# Patient Record
Sex: FEMALE | Race: BLACK OR AFRICAN AMERICAN | NOT HISPANIC OR LATINO | Employment: OTHER | URBAN - METROPOLITAN AREA
[De-identification: names, ages, dates, MRNs, and addresses within clinical notes are randomized per-mention and may not be internally consistent; named-entity substitution may affect disease eponyms.]

---

## 2018-05-22 ENCOUNTER — APPOINTMENT (OUTPATIENT)
Dept: MRI IMAGING | Facility: HOSPITAL | Age: 73
End: 2018-05-22

## 2018-05-22 ENCOUNTER — HOSPITAL ENCOUNTER (INPATIENT)
Facility: HOSPITAL | Age: 73
LOS: 7 days | Discharge: HOME HEALTH | End: 2018-05-29
Attending: INTERNAL MEDICINE | Admitting: RADIOLOGY

## 2018-05-22 DIAGNOSIS — I65.09 VERTEBROBASILAR ARTERY STENOSIS: ICD-10-CM

## 2018-05-22 DIAGNOSIS — Z78.9 IMPAIRED MOBILITY AND ADLS: Primary | ICD-10-CM

## 2018-05-22 DIAGNOSIS — R13.10 DYSPHAGIA, UNSPECIFIED TYPE: ICD-10-CM

## 2018-05-22 DIAGNOSIS — Z74.09 IMPAIRED MOBILITY AND ADLS: Primary | ICD-10-CM

## 2018-05-22 DIAGNOSIS — R47.1 DYSARTHRIA: ICD-10-CM

## 2018-05-22 DIAGNOSIS — I65.1 VERTEBROBASILAR ARTERY STENOSIS: ICD-10-CM

## 2018-05-22 DIAGNOSIS — Z74.09 IMPAIRED FUNCTIONAL MOBILITY, BALANCE, GAIT, AND ENDURANCE: ICD-10-CM

## 2018-05-22 PROBLEM — N18.9 CHRONIC KIDNEY DISEASE: Status: ACTIVE | Noted: 2018-05-22

## 2018-05-22 PROBLEM — I67.2 ATHEROSCLEROTIC CEREBROVASCULAR DISEASE: Status: ACTIVE | Noted: 2018-05-22

## 2018-05-22 PROBLEM — I10 HYPERTENSION: Status: ACTIVE | Noted: 2018-05-22

## 2018-05-22 PROBLEM — I63.9 STROKE (HCC): Status: ACTIVE | Noted: 2018-05-22

## 2018-05-22 PROBLEM — E78.5 HYPERLIPIDEMIA: Status: ACTIVE | Noted: 2018-05-22

## 2018-05-22 PROBLEM — I63.9 CVA (CEREBRAL VASCULAR ACCIDENT) (HCC): Status: ACTIVE | Noted: 2018-05-22

## 2018-05-22 LAB — GLUCOSE BLDC GLUCOMTR-MCNC: 90 MG/DL (ref 70–130)

## 2018-05-22 PROCEDURE — 70544 MR ANGIOGRAPHY HEAD W/O DYE: CPT

## 2018-05-22 PROCEDURE — C1769 GUIDE WIRE: HCPCS | Performed by: RADIOLOGY

## 2018-05-22 PROCEDURE — C1894 INTRO/SHEATH, NON-LASER: HCPCS | Performed by: RADIOLOGY

## 2018-05-22 PROCEDURE — C1887 CATHETER, GUIDING: HCPCS | Performed by: RADIOLOGY

## 2018-05-22 PROCEDURE — 25010000002 HEPARIN (PORCINE) PER 1000 UNITS: Performed by: RADIOLOGY

## 2018-05-22 PROCEDURE — C1894 INTRO/SHEATH, NON-LASER: HCPCS

## 2018-05-22 PROCEDURE — 61645 PERQ ART M-THROMBECT &/NFS: CPT | Performed by: RADIOLOGY

## 2018-05-22 PROCEDURE — 0 IODIXANOL PER 1 ML: Performed by: RADIOLOGY

## 2018-05-22 PROCEDURE — 03CG3ZZ EXTIRPATION OF MATTER FROM INTRACRANIAL ARTERY, PERCUTANEOUS APPROACH: ICD-10-PCS | Performed by: RADIOLOGY

## 2018-05-22 PROCEDURE — 99221 1ST HOSP IP/OBS SF/LOW 40: CPT | Performed by: NURSE PRACTITIONER

## 2018-05-22 PROCEDURE — 02HV33Z INSERTION OF INFUSION DEVICE INTO SUPERIOR VENA CAVA, PERCUTANEOUS APPROACH: ICD-10-PCS | Performed by: INTERNAL MEDICINE

## 2018-05-22 PROCEDURE — 25010000002 HEPARIN (PORCINE) PER 1000 UNITS: Performed by: INTERNAL MEDICINE

## 2018-05-22 PROCEDURE — C1760 CLOSURE DEV, VASC: HCPCS | Performed by: RADIOLOGY

## 2018-05-22 PROCEDURE — 70551 MRI BRAIN STEM W/O DYE: CPT

## 2018-05-22 PROCEDURE — C1725 CATH, TRANSLUMIN NON-LASER: HCPCS | Performed by: RADIOLOGY

## 2018-05-22 PROCEDURE — 82962 GLUCOSE BLOOD TEST: CPT

## 2018-05-22 PROCEDURE — 99291 CRITICAL CARE FIRST HOUR: CPT | Performed by: INTERNAL MEDICINE

## 2018-05-22 PROCEDURE — C1751 CATH, INF, PER/CENT/MIDLINE: HCPCS

## 2018-05-22 PROCEDURE — B31RYZZ FLUOROSCOPY OF INTRACRANIAL ARTERIES USING OTHER CONTRAST: ICD-10-PCS | Performed by: RADIOLOGY

## 2018-05-22 PROCEDURE — 3E06317 INTRODUCTION OF OTHER THROMBOLYTIC INTO CENTRAL ARTERY, PERCUTANEOUS APPROACH: ICD-10-PCS | Performed by: RADIOLOGY

## 2018-05-22 PROCEDURE — 25010000002 ALTEPLASE 2 MG RECONSTITUTED SOLUTION: Performed by: RADIOLOGY

## 2018-05-22 RX ORDER — SODIUM CHLORIDE 0.9 % (FLUSH) 0.9 %
1-10 SYRINGE (ML) INJECTION AS NEEDED
Status: DISCONTINUED | OUTPATIENT
Start: 2018-05-22 | End: 2018-05-29 | Stop reason: HOSPADM

## 2018-05-22 RX ORDER — CLOPIDOGREL BISULFATE 75 MG/1
75 TABLET ORAL DAILY
Status: DISCONTINUED | OUTPATIENT
Start: 2018-05-22 | End: 2018-05-23

## 2018-05-22 RX ORDER — PHENYLEPHRINE HCL IN 0.9% NACL 0.5 MG/5ML
.5-3 SYRINGE (ML) INTRAVENOUS
Status: DISCONTINUED | OUTPATIENT
Start: 2018-05-22 | End: 2018-05-22 | Stop reason: SDUPTHER

## 2018-05-22 RX ORDER — HEPARIN SODIUM 1000 [USP'U]/ML
INJECTION, SOLUTION INTRAVENOUS; SUBCUTANEOUS AS NEEDED
Status: DISCONTINUED | OUTPATIENT
Start: 2018-05-22 | End: 2018-05-22 | Stop reason: HOSPADM

## 2018-05-22 RX ORDER — SODIUM CHLORIDE 0.9 % (FLUSH) 0.9 %
10 SYRINGE (ML) INJECTION AS NEEDED
Status: DISCONTINUED | OUTPATIENT
Start: 2018-05-22 | End: 2018-05-23

## 2018-05-22 RX ORDER — ATORVASTATIN CALCIUM 40 MG/1
80 TABLET, FILM COATED ORAL NIGHTLY
Status: DISCONTINUED | OUTPATIENT
Start: 2018-05-22 | End: 2018-05-22 | Stop reason: SDUPTHER

## 2018-05-22 RX ORDER — SODIUM CHLORIDE 9 MG/ML
50 INJECTION, SOLUTION INTRAVENOUS CONTINUOUS
Status: DISCONTINUED | OUTPATIENT
Start: 2018-05-22 | End: 2018-05-26

## 2018-05-22 RX ORDER — ATORVASTATIN CALCIUM 40 MG/1
80 TABLET, FILM COATED ORAL NIGHTLY
Status: DISCONTINUED | OUTPATIENT
Start: 2018-05-22 | End: 2018-05-29 | Stop reason: HOSPADM

## 2018-05-22 RX ORDER — LIDOCAINE HYDROCHLORIDE 10 MG/ML
INJECTION, SOLUTION EPIDURAL; INFILTRATION; INTRACAUDAL; PERINEURAL AS NEEDED
Status: DISCONTINUED | OUTPATIENT
Start: 2018-05-22 | End: 2018-05-22 | Stop reason: HOSPADM

## 2018-05-22 RX ORDER — ONDANSETRON 2 MG/ML
4 INJECTION INTRAMUSCULAR; INTRAVENOUS EVERY 6 HOURS PRN
Status: DISCONTINUED | OUTPATIENT
Start: 2018-05-22 | End: 2018-05-29 | Stop reason: HOSPADM

## 2018-05-22 RX ORDER — SODIUM CHLORIDE 0.9 % (FLUSH) 0.9 %
1-10 SYRINGE (ML) INJECTION AS NEEDED
Status: DISCONTINUED | OUTPATIENT
Start: 2018-05-22 | End: 2018-05-23

## 2018-05-22 RX ORDER — CLOPIDOGREL BISULFATE 75 MG/1
TABLET ORAL AS NEEDED
Status: DISCONTINUED | OUTPATIENT
Start: 2018-05-22 | End: 2018-05-22 | Stop reason: HOSPADM

## 2018-05-22 RX ORDER — ASPIRIN 300 MG/1
300 SUPPOSITORY RECTAL DAILY
Status: DISCONTINUED | OUTPATIENT
Start: 2018-05-23 | End: 2018-05-23

## 2018-05-22 RX ORDER — IODIXANOL 320 MG/ML
INJECTION, SOLUTION INTRAVASCULAR AS NEEDED
Status: DISCONTINUED | OUTPATIENT
Start: 2018-05-22 | End: 2018-05-22 | Stop reason: HOSPADM

## 2018-05-22 RX ORDER — ASPIRIN 300 MG/1
300 SUPPOSITORY RECTAL DAILY
Status: DISCONTINUED | OUTPATIENT
Start: 2018-05-22 | End: 2018-05-23

## 2018-05-22 RX ORDER — ASPIRIN 325 MG
325 TABLET ORAL DAILY
Status: DISCONTINUED | OUTPATIENT
Start: 2018-05-23 | End: 2018-05-23

## 2018-05-22 RX ORDER — ASPIRIN 325 MG
325 TABLET ORAL DAILY
Status: DISCONTINUED | OUTPATIENT
Start: 2018-05-22 | End: 2018-05-23

## 2018-05-22 RX ADMIN — ASPIRIN 300 MG: 300 SUPPOSITORY RECTAL at 12:45

## 2018-05-22 RX ADMIN — Medication 0.5 MCG/KG/MIN: at 12:46

## 2018-05-22 RX ADMIN — SODIUM CHLORIDE 50 ML/HR: 9 INJECTION, SOLUTION INTRAVENOUS at 16:50

## 2018-05-22 RX ADMIN — NICARDIPINE HYDROCHLORIDE 5 MG/HR: 0.1 INJECTION, SOLUTION INTRAVENOUS at 22:06

## 2018-05-22 RX ADMIN — HEPARIN SODIUM 12 UNITS/KG/HR: 10000 INJECTION, SOLUTION INTRAVENOUS at 17:16

## 2018-05-22 RX ADMIN — NICARDIPINE HYDROCHLORIDE 5 MG/HR: 0.1 INJECTION, SOLUTION INTRAVENOUS at 17:39

## 2018-05-23 ENCOUNTER — APPOINTMENT (OUTPATIENT)
Dept: CT IMAGING | Facility: HOSPITAL | Age: 73
End: 2018-05-23

## 2018-05-23 ENCOUNTER — ANCILLARY PROCEDURE (OUTPATIENT)
Dept: SPEECH THERAPY | Facility: HOSPITAL | Age: 73
End: 2018-05-23
Attending: INTERNAL MEDICINE

## 2018-05-23 LAB
ALBUMIN SERPL-MCNC: 3.7 G/DL (ref 3.2–4.8)
ALBUMIN/GLOB SERPL: 1.3 G/DL (ref 1.5–2.5)
ALP SERPL-CCNC: 88 U/L (ref 25–100)
ALT SERPL W P-5'-P-CCNC: 24 U/L (ref 7–40)
ANION GAP SERPL CALCULATED.3IONS-SCNC: 9 MMOL/L (ref 3–11)
ARTICHOKE IGE QN: 141 MG/DL (ref 0–130)
AST SERPL-CCNC: 29 U/L (ref 0–33)
BASOPHILS # BLD AUTO: 0.07 10*3/MM3 (ref 0–0.2)
BASOPHILS NFR BLD AUTO: 0.9 % (ref 0–1)
BILIRUB SERPL-MCNC: 1 MG/DL (ref 0.3–1.2)
BUN BLD-MCNC: 17 MG/DL (ref 9–23)
BUN/CREAT SERPL: 18.9 (ref 7–25)
CALCIUM SPEC-SCNC: 8.4 MG/DL (ref 8.7–10.4)
CHLORIDE SERPL-SCNC: 104 MMOL/L (ref 99–109)
CHOLEST SERPL-MCNC: 201 MG/DL (ref 0–200)
CO2 SERPL-SCNC: 26 MMOL/L (ref 20–31)
CREAT BLD-MCNC: 0.9 MG/DL (ref 0.6–1.3)
DEPRECATED RDW RBC AUTO: 43.5 FL (ref 37–54)
EOSINOPHIL # BLD AUTO: 0.26 10*3/MM3 (ref 0–0.3)
EOSINOPHIL NFR BLD AUTO: 3.3 % (ref 0–3)
ERYTHROCYTE [DISTWIDTH] IN BLOOD BY AUTOMATED COUNT: 13.9 % (ref 11.3–14.5)
GFR SERPL CREATININE-BSD FRML MDRD: 74 ML/MIN/1.73
GLOBULIN UR ELPH-MCNC: 2.9 GM/DL
GLUCOSE BLD-MCNC: 86 MG/DL (ref 70–100)
GLUCOSE BLDC GLUCOMTR-MCNC: 79 MG/DL (ref 70–130)
HBA1C MFR BLD: 6.2 % (ref 4.8–5.6)
HCT VFR BLD AUTO: 50.7 % (ref 34.5–44)
HDLC SERPL-MCNC: 55 MG/DL (ref 40–60)
HGB BLD-MCNC: 16.2 G/DL (ref 11.5–15.5)
IMM GRANULOCYTES # BLD: 0.01 10*3/MM3 (ref 0–0.03)
IMM GRANULOCYTES NFR BLD: 0.1 % (ref 0–0.6)
LYMPHOCYTES # BLD AUTO: 2.04 10*3/MM3 (ref 0.6–4.8)
LYMPHOCYTES NFR BLD AUTO: 25.8 % (ref 24–44)
MAGNESIUM SERPL-MCNC: 1.9 MG/DL (ref 1.3–2.7)
MCH RBC QN AUTO: 27.5 PG (ref 27–31)
MCHC RBC AUTO-ENTMCNC: 32 G/DL (ref 32–36)
MCV RBC AUTO: 86.1 FL (ref 80–99)
MONOCYTES # BLD AUTO: 1.07 10*3/MM3 (ref 0–1)
MONOCYTES NFR BLD AUTO: 13.5 % (ref 0–12)
NEUTROPHILS # BLD AUTO: 4.48 10*3/MM3 (ref 1.5–8.3)
NEUTROPHILS NFR BLD AUTO: 56.5 % (ref 41–71)
PHOSPHATE SERPL-MCNC: 3.6 MG/DL (ref 2.4–5.1)
PLATELET # BLD AUTO: 246 10*3/MM3 (ref 150–450)
PMV BLD AUTO: 10 FL (ref 6–12)
POTASSIUM BLD-SCNC: 3.3 MMOL/L (ref 3.5–5.5)
POTASSIUM BLD-SCNC: 4.5 MMOL/L (ref 3.5–5.5)
PROT SERPL-MCNC: 6.6 G/DL (ref 5.7–8.2)
RBC # BLD AUTO: 5.89 10*6/MM3 (ref 3.89–5.14)
SODIUM BLD-SCNC: 139 MMOL/L (ref 132–146)
TRIGL SERPL-MCNC: 81 MG/DL (ref 0–150)
WBC NRBC COR # BLD: 7.92 10*3/MM3 (ref 3.5–10.8)

## 2018-05-23 PROCEDURE — 97165 OT EVAL LOW COMPLEX 30 MIN: CPT

## 2018-05-23 PROCEDURE — 83735 ASSAY OF MAGNESIUM: CPT | Performed by: INTERNAL MEDICINE

## 2018-05-23 PROCEDURE — 99233 SBSQ HOSP IP/OBS HIGH 50: CPT | Performed by: INTERNAL MEDICINE

## 2018-05-23 PROCEDURE — 84100 ASSAY OF PHOSPHORUS: CPT | Performed by: INTERNAL MEDICINE

## 2018-05-23 PROCEDURE — 70450 CT HEAD/BRAIN W/O DYE: CPT

## 2018-05-23 PROCEDURE — 84132 ASSAY OF SERUM POTASSIUM: CPT | Performed by: INTERNAL MEDICINE

## 2018-05-23 PROCEDURE — 80061 LIPID PANEL: CPT | Performed by: NURSE PRACTITIONER

## 2018-05-23 PROCEDURE — 85025 COMPLETE CBC W/AUTO DIFF WBC: CPT | Performed by: INTERNAL MEDICINE

## 2018-05-23 PROCEDURE — 82962 GLUCOSE BLOOD TEST: CPT

## 2018-05-23 PROCEDURE — 80053 COMPREHEN METABOLIC PANEL: CPT | Performed by: NURSE PRACTITIONER

## 2018-05-23 PROCEDURE — 97116 GAIT TRAINING THERAPY: CPT

## 2018-05-23 PROCEDURE — 94799 UNLISTED PULMONARY SVC/PX: CPT

## 2018-05-23 PROCEDURE — 92612 ENDOSCOPY SWALLOW (FEES) VID: CPT

## 2018-05-23 PROCEDURE — 97162 PT EVAL MOD COMPLEX 30 MIN: CPT

## 2018-05-23 PROCEDURE — 99232 SBSQ HOSP IP/OBS MODERATE 35: CPT | Performed by: RADIOLOGY

## 2018-05-23 PROCEDURE — 83036 HEMOGLOBIN GLYCOSYLATED A1C: CPT | Performed by: NURSE PRACTITIONER

## 2018-05-23 RX ORDER — ASPIRIN 81 MG/1
81 TABLET ORAL DAILY
COMMUNITY
End: 2018-06-20 | Stop reason: HOSPADM

## 2018-05-23 RX ORDER — MAGNESIUM SULFATE HEPTAHYDRATE 40 MG/ML
2 INJECTION, SOLUTION INTRAVENOUS AS NEEDED
Status: DISCONTINUED | OUTPATIENT
Start: 2018-05-23 | End: 2018-05-29 | Stop reason: HOSPADM

## 2018-05-23 RX ORDER — POTASSIUM CHLORIDE 1.5 G/1.77G
40 POWDER, FOR SOLUTION ORAL AS NEEDED
Status: DISCONTINUED | OUTPATIENT
Start: 2018-05-23 | End: 2018-05-29 | Stop reason: HOSPADM

## 2018-05-23 RX ORDER — ASPIRIN 81 MG/1
81 TABLET, CHEWABLE ORAL DAILY
Status: DISCONTINUED | OUTPATIENT
Start: 2018-05-23 | End: 2018-05-29 | Stop reason: HOSPADM

## 2018-05-23 RX ORDER — POTASSIUM CHLORIDE 750 MG/1
40 CAPSULE, EXTENDED RELEASE ORAL AS NEEDED
Status: DISCONTINUED | OUTPATIENT
Start: 2018-05-23 | End: 2018-05-29 | Stop reason: HOSPADM

## 2018-05-23 RX ORDER — MAGNESIUM SULFATE HEPTAHYDRATE 40 MG/ML
4 INJECTION, SOLUTION INTRAVENOUS AS NEEDED
Status: DISCONTINUED | OUTPATIENT
Start: 2018-05-23 | End: 2018-05-29 | Stop reason: HOSPADM

## 2018-05-23 RX ORDER — ATORVASTATIN CALCIUM 40 MG/1
40 TABLET, FILM COATED ORAL DAILY
COMMUNITY
End: 2018-05-29 | Stop reason: HOSPADM

## 2018-05-23 RX ORDER — CLOPIDOGREL BISULFATE 75 MG/1
75 TABLET ORAL DAILY
Status: DISCONTINUED | OUTPATIENT
Start: 2018-05-23 | End: 2018-05-29 | Stop reason: HOSPADM

## 2018-05-23 RX ADMIN — POTASSIUM CHLORIDE 40 MEQ: 750 CAPSULE, EXTENDED RELEASE ORAL at 14:30

## 2018-05-23 RX ADMIN — MAGNESIUM SULFATE HEPTAHYDRATE 4 G: 40 INJECTION, SOLUTION INTRAVENOUS at 06:12

## 2018-05-23 RX ADMIN — POTASSIUM CHLORIDE 40 MEQ: 750 CAPSULE, EXTENDED RELEASE ORAL at 09:36

## 2018-05-23 RX ADMIN — ATORVASTATIN CALCIUM 80 MG: 40 TABLET, FILM COATED ORAL at 20:00

## 2018-05-23 RX ADMIN — CLOPIDOGREL BISULFATE 75 MG: 75 TABLET ORAL at 09:36

## 2018-05-23 RX ADMIN — ASPIRIN 81 MG CHEWABLE TABLET 81 MG: 81 TABLET CHEWABLE at 09:36

## 2018-05-23 RX ADMIN — SODIUM CHLORIDE 50 ML/HR: 9 INJECTION, SOLUTION INTRAVENOUS at 20:02

## 2018-05-24 ENCOUNTER — APPOINTMENT (OUTPATIENT)
Dept: MRI IMAGING | Facility: HOSPITAL | Age: 73
End: 2018-05-24

## 2018-05-24 LAB
ANION GAP SERPL CALCULATED.3IONS-SCNC: 6 MMOL/L (ref 3–11)
BUN BLD-MCNC: 19 MG/DL (ref 9–23)
BUN/CREAT SERPL: 23.8 (ref 7–25)
CALCIUM SPEC-SCNC: 8.7 MG/DL (ref 8.7–10.4)
CHLORIDE SERPL-SCNC: 106 MMOL/L (ref 99–109)
CO2 SERPL-SCNC: 23 MMOL/L (ref 20–31)
CREAT BLD-MCNC: 0.8 MG/DL (ref 0.6–1.3)
GFR SERPL CREATININE-BSD FRML MDRD: 85 ML/MIN/1.73
GLUCOSE BLD-MCNC: 100 MG/DL (ref 70–100)
MAGNESIUM SERPL-MCNC: 2.3 MG/DL (ref 1.3–2.7)
POTASSIUM BLD-SCNC: 4.2 MMOL/L (ref 3.5–5.5)
SODIUM BLD-SCNC: 135 MMOL/L (ref 132–146)

## 2018-05-24 PROCEDURE — 92526 ORAL FUNCTION THERAPY: CPT

## 2018-05-24 PROCEDURE — 25010000002 HYDRALAZINE PER 20 MG: Performed by: NURSE PRACTITIONER

## 2018-05-24 PROCEDURE — 99231 SBSQ HOSP IP/OBS SF/LOW 25: CPT | Performed by: NURSE PRACTITIONER

## 2018-05-24 PROCEDURE — 70544 MR ANGIOGRAPHY HEAD W/O DYE: CPT

## 2018-05-24 PROCEDURE — 97110 THERAPEUTIC EXERCISES: CPT

## 2018-05-24 PROCEDURE — 97530 THERAPEUTIC ACTIVITIES: CPT

## 2018-05-24 PROCEDURE — 80048 BASIC METABOLIC PNL TOTAL CA: CPT | Performed by: INTERNAL MEDICINE

## 2018-05-24 PROCEDURE — 92523 SPEECH SOUND LANG COMPREHEN: CPT

## 2018-05-24 PROCEDURE — 99233 SBSQ HOSP IP/OBS HIGH 50: CPT | Performed by: INTERNAL MEDICINE

## 2018-05-24 PROCEDURE — 83735 ASSAY OF MAGNESIUM: CPT | Performed by: INTERNAL MEDICINE

## 2018-05-24 RX ORDER — HYDRALAZINE HYDROCHLORIDE 20 MG/ML
10 INJECTION INTRAMUSCULAR; INTRAVENOUS EVERY 4 HOURS PRN
Status: DISCONTINUED | OUTPATIENT
Start: 2018-05-24 | End: 2018-05-29 | Stop reason: HOSPADM

## 2018-05-24 RX ORDER — AMLODIPINE BESYLATE 5 MG/1
5 TABLET ORAL
Status: DISCONTINUED | OUTPATIENT
Start: 2018-05-24 | End: 2018-05-28

## 2018-05-24 RX ADMIN — ASPIRIN 81 MG CHEWABLE TABLET 81 MG: 81 TABLET CHEWABLE at 08:22

## 2018-05-24 RX ADMIN — Medication 10 MG: at 16:35

## 2018-05-24 RX ADMIN — Medication 10 MG: at 20:09

## 2018-05-24 RX ADMIN — AMLODIPINE BESYLATE 5 MG: 5 TABLET ORAL at 16:18

## 2018-05-24 RX ADMIN — ATORVASTATIN CALCIUM 80 MG: 40 TABLET, FILM COATED ORAL at 20:06

## 2018-05-24 RX ADMIN — CLOPIDOGREL BISULFATE 75 MG: 75 TABLET ORAL at 08:22

## 2018-05-25 PROCEDURE — 92507 TX SP LANG VOICE COMM INDIV: CPT

## 2018-05-25 PROCEDURE — 25010000002 HYDRALAZINE PER 20 MG: Performed by: NURSE PRACTITIONER

## 2018-05-25 PROCEDURE — 99233 SBSQ HOSP IP/OBS HIGH 50: CPT | Performed by: INTERNAL MEDICINE

## 2018-05-25 PROCEDURE — 99231 SBSQ HOSP IP/OBS SF/LOW 25: CPT | Performed by: RADIOLOGY

## 2018-05-25 PROCEDURE — 97116 GAIT TRAINING THERAPY: CPT | Performed by: PHYSICAL THERAPIST

## 2018-05-25 PROCEDURE — 97110 THERAPEUTIC EXERCISES: CPT | Performed by: PHYSICAL THERAPIST

## 2018-05-25 RX ADMIN — AMLODIPINE BESYLATE 5 MG: 5 TABLET ORAL at 08:27

## 2018-05-25 RX ADMIN — Medication 10 MG: at 11:39

## 2018-05-25 RX ADMIN — CLOPIDOGREL BISULFATE 75 MG: 75 TABLET ORAL at 08:27

## 2018-05-25 RX ADMIN — ATORVASTATIN CALCIUM 80 MG: 40 TABLET, FILM COATED ORAL at 21:07

## 2018-05-25 RX ADMIN — ASPIRIN 81 MG CHEWABLE TABLET 81 MG: 81 TABLET CHEWABLE at 08:27

## 2018-05-26 PROCEDURE — 97530 THERAPEUTIC ACTIVITIES: CPT | Performed by: OCCUPATIONAL THERAPIST

## 2018-05-26 PROCEDURE — 99232 SBSQ HOSP IP/OBS MODERATE 35: CPT | Performed by: FAMILY MEDICINE

## 2018-05-26 RX ADMIN — AMLODIPINE BESYLATE 5 MG: 5 TABLET ORAL at 09:35

## 2018-05-26 RX ADMIN — CLOPIDOGREL BISULFATE 75 MG: 75 TABLET ORAL at 09:35

## 2018-05-26 RX ADMIN — ASPIRIN 81 MG CHEWABLE TABLET 81 MG: 81 TABLET CHEWABLE at 09:35

## 2018-05-26 RX ADMIN — ATORVASTATIN CALCIUM 80 MG: 40 TABLET, FILM COATED ORAL at 20:12

## 2018-05-27 PROCEDURE — 99232 SBSQ HOSP IP/OBS MODERATE 35: CPT | Performed by: INTERNAL MEDICINE

## 2018-05-27 PROCEDURE — 97110 THERAPEUTIC EXERCISES: CPT

## 2018-05-27 PROCEDURE — 97116 GAIT TRAINING THERAPY: CPT

## 2018-05-27 RX ADMIN — ASPIRIN 81 MG CHEWABLE TABLET 81 MG: 81 TABLET CHEWABLE at 08:28

## 2018-05-27 RX ADMIN — ATORVASTATIN CALCIUM 80 MG: 40 TABLET, FILM COATED ORAL at 20:26

## 2018-05-27 RX ADMIN — CLOPIDOGREL BISULFATE 75 MG: 75 TABLET ORAL at 08:28

## 2018-05-27 RX ADMIN — AMLODIPINE BESYLATE 5 MG: 5 TABLET ORAL at 08:28

## 2018-05-28 PROCEDURE — 99232 SBSQ HOSP IP/OBS MODERATE 35: CPT | Performed by: INTERNAL MEDICINE

## 2018-05-28 PROCEDURE — 97110 THERAPEUTIC EXERCISES: CPT

## 2018-05-28 PROCEDURE — 97530 THERAPEUTIC ACTIVITIES: CPT

## 2018-05-28 PROCEDURE — 97116 GAIT TRAINING THERAPY: CPT

## 2018-05-28 RX ORDER — AMLODIPINE BESYLATE 5 MG/1
5 TABLET ORAL
Status: DISCONTINUED | OUTPATIENT
Start: 2018-05-29 | End: 2018-05-29

## 2018-05-28 RX ORDER — AMLODIPINE BESYLATE 10 MG/1
10 TABLET ORAL
Status: DISCONTINUED | OUTPATIENT
Start: 2018-05-29 | End: 2018-05-28

## 2018-05-28 RX ORDER — AMLODIPINE BESYLATE 5 MG/1
5 TABLET ORAL ONCE
Status: DISCONTINUED | OUTPATIENT
Start: 2018-05-28 | End: 2018-05-28

## 2018-05-28 RX ADMIN — ATORVASTATIN CALCIUM 80 MG: 40 TABLET, FILM COATED ORAL at 21:34

## 2018-05-28 RX ADMIN — ASPIRIN 81 MG CHEWABLE TABLET 81 MG: 81 TABLET CHEWABLE at 10:00

## 2018-05-28 RX ADMIN — Medication 10 ML: at 08:07

## 2018-05-28 RX ADMIN — Medication 10 ML: at 08:08

## 2018-05-28 RX ADMIN — CLOPIDOGREL BISULFATE 75 MG: 75 TABLET ORAL at 10:00

## 2018-05-28 RX ADMIN — AMLODIPINE BESYLATE 5 MG: 5 TABLET ORAL at 08:00

## 2018-05-29 VITALS
WEIGHT: 175.93 LBS | SYSTOLIC BLOOD PRESSURE: 172 MMHG | BODY MASS INDEX: 28.27 KG/M2 | HEIGHT: 66 IN | OXYGEN SATURATION: 99 % | RESPIRATION RATE: 18 BRPM | TEMPERATURE: 97.9 F | DIASTOLIC BLOOD PRESSURE: 106 MMHG | HEART RATE: 77 BPM

## 2018-05-29 PROCEDURE — 99239 HOSP IP/OBS DSCHRG MGMT >30: CPT | Performed by: INTERNAL MEDICINE

## 2018-05-29 PROCEDURE — 99232 SBSQ HOSP IP/OBS MODERATE 35: CPT | Performed by: NURSE PRACTITIONER

## 2018-05-29 RX ORDER — AMLODIPINE BESYLATE 2.5 MG/1
7.5 TABLET ORAL
Qty: 90 TABLET | Refills: 2 | Status: ON HOLD | OUTPATIENT
Start: 2018-05-30 | End: 2018-06-14

## 2018-05-29 RX ORDER — AMLODIPINE BESYLATE 2.5 MG/1
2.5 TABLET ORAL ONCE
Status: COMPLETED | OUTPATIENT
Start: 2018-05-29 | End: 2018-05-29

## 2018-05-29 RX ORDER — CLOPIDOGREL BISULFATE 75 MG/1
75 TABLET ORAL DAILY
Qty: 30 TABLET | Refills: 2 | Status: SHIPPED | OUTPATIENT
Start: 2018-05-30 | End: 2018-06-20 | Stop reason: HOSPADM

## 2018-05-29 RX ORDER — ATORVASTATIN CALCIUM 80 MG/1
80 TABLET, FILM COATED ORAL NIGHTLY
Qty: 30 TABLET | Refills: 2 | Status: SHIPPED | OUTPATIENT
Start: 2018-05-29 | End: 2018-06-20 | Stop reason: HOSPADM

## 2018-05-29 RX ADMIN — AMLODIPINE BESYLATE 5 MG: 5 TABLET ORAL at 09:30

## 2018-05-29 RX ADMIN — AMLODIPINE BESYLATE 2.5 MG: 2.5 TABLET ORAL at 13:35

## 2018-05-29 RX ADMIN — Medication 10 ML: at 08:00

## 2018-05-29 RX ADMIN — Medication 10 ML: at 08:01

## 2018-05-29 RX ADMIN — CLOPIDOGREL BISULFATE 75 MG: 75 TABLET ORAL at 09:30

## 2018-05-29 RX ADMIN — ASPIRIN 81 MG CHEWABLE TABLET 81 MG: 81 TABLET CHEWABLE at 09:30

## 2018-06-13 ENCOUNTER — APPOINTMENT (OUTPATIENT)
Dept: GENERAL RADIOLOGY | Facility: HOSPITAL | Age: 73
End: 2018-06-13

## 2018-06-13 ENCOUNTER — APPOINTMENT (OUTPATIENT)
Dept: CT IMAGING | Facility: HOSPITAL | Age: 73
End: 2018-06-13

## 2018-06-13 ENCOUNTER — HOSPITAL ENCOUNTER (INPATIENT)
Facility: HOSPITAL | Age: 73
LOS: 7 days | Discharge: NURSING, REHAB OR LONG-TERM CARE | End: 2018-06-20
Attending: EMERGENCY MEDICINE | Admitting: INTERNAL MEDICINE

## 2018-06-13 ENCOUNTER — APPOINTMENT (OUTPATIENT)
Dept: MRI IMAGING | Facility: HOSPITAL | Age: 73
End: 2018-06-13

## 2018-06-13 ENCOUNTER — ANESTHESIA (OUTPATIENT)
Dept: CARDIOLOGY | Facility: HOSPITAL | Age: 73
End: 2018-06-13

## 2018-06-13 ENCOUNTER — ANESTHESIA EVENT (OUTPATIENT)
Dept: CARDIOLOGY | Facility: HOSPITAL | Age: 73
End: 2018-06-13

## 2018-06-13 DIAGNOSIS — I65.1 VERTEBROBASILAR ARTERY STENOSIS: ICD-10-CM

## 2018-06-13 DIAGNOSIS — R13.12 OROPHARYNGEAL DYSPHAGIA: ICD-10-CM

## 2018-06-13 DIAGNOSIS — Z74.09 IMPAIRED MOBILITY AND ADLS: ICD-10-CM

## 2018-06-13 DIAGNOSIS — Z74.09 IMPAIRED FUNCTIONAL MOBILITY, BALANCE, GAIT, AND ENDURANCE: ICD-10-CM

## 2018-06-13 DIAGNOSIS — I65.09 VERTEBROBASILAR ARTERY STENOSIS: ICD-10-CM

## 2018-06-13 DIAGNOSIS — I63.9 CEREBROVASCULAR ACCIDENT (CVA), UNSPECIFIED MECHANISM (HCC): Primary | ICD-10-CM

## 2018-06-13 DIAGNOSIS — R47.1 DYSARTHRIA: ICD-10-CM

## 2018-06-13 DIAGNOSIS — Z78.9 IMPAIRED MOBILITY AND ADLS: ICD-10-CM

## 2018-06-13 DIAGNOSIS — R29.898 WEAKNESS OF LEFT LOWER EXTREMITY: ICD-10-CM

## 2018-06-13 LAB
BASOPHILS # BLD AUTO: 0.06 10*3/MM3 (ref 0–0.2)
BASOPHILS NFR BLD AUTO: 0.8 % (ref 0–1)
BUN BLDA-MCNC: 17 MG/DL (ref 8–26)
CA-I BLDA-SCNC: 1.27 MMOL/L (ref 1.2–1.32)
CHLORIDE BLDA-SCNC: 100 MMOL/L (ref 98–109)
CO2 BLDA-SCNC: 28 MMOL/L (ref 24–29)
CREAT BLDA-MCNC: 0.9 MG/DL (ref 0.6–1.3)
DEPRECATED RDW RBC AUTO: 41.4 FL (ref 37–54)
EOSINOPHIL # BLD AUTO: 0.13 10*3/MM3 (ref 0–0.3)
EOSINOPHIL NFR BLD AUTO: 1.7 % (ref 0–3)
ERYTHROCYTE [DISTWIDTH] IN BLOOD BY AUTOMATED COUNT: 13.2 % (ref 11.3–14.5)
GLUCOSE BLDC GLUCOMTR-MCNC: 106 MG/DL (ref 70–130)
GLUCOSE BLDC GLUCOMTR-MCNC: 121 MG/DL (ref 70–130)
HCT VFR BLD AUTO: 50.8 % (ref 34.5–44)
HCT VFR BLDA CALC: 54 % (ref 38–51)
HGB BLD-MCNC: 16.2 G/DL (ref 11.5–15.5)
HGB BLDA-MCNC: 18.4 G/DL (ref 12–17)
HOLD SPECIMEN: NORMAL
HOLD SPECIMEN: NORMAL
IMM GRANULOCYTES # BLD: 0.01 10*3/MM3 (ref 0–0.03)
IMM GRANULOCYTES NFR BLD: 0.1 % (ref 0–0.6)
INR PPP: 1 (ref 0.8–1.2)
LYMPHOCYTES # BLD AUTO: 2.64 10*3/MM3 (ref 0.6–4.8)
LYMPHOCYTES NFR BLD AUTO: 35.3 % (ref 24–44)
MCH RBC QN AUTO: 27.6 PG (ref 27–31)
MCHC RBC AUTO-ENTMCNC: 31.9 G/DL (ref 32–36)
MCV RBC AUTO: 86.5 FL (ref 80–99)
MONOCYTES # BLD AUTO: 0.47 10*3/MM3 (ref 0–1)
MONOCYTES NFR BLD AUTO: 6.3 % (ref 0–12)
NEUTROPHILS # BLD AUTO: 4.18 10*3/MM3 (ref 1.5–8.3)
NEUTROPHILS NFR BLD AUTO: 55.9 % (ref 41–71)
PA ADP PRP-ACNC: 103 PRU
PLATELET # BLD AUTO: 268 10*3/MM3 (ref 150–450)
PMV BLD AUTO: 9.6 FL (ref 6–12)
POTASSIUM BLDA-SCNC: 3.5 MMOL/L (ref 3.5–4.9)
PROTHROMBIN TIME: 11.8 SECONDS (ref 12.8–15.2)
RBC # BLD AUTO: 5.87 10*6/MM3 (ref 3.89–5.14)
SODIUM BLDA-SCNC: 143 MMOL/L (ref 138–146)
WBC NRBC COR # BLD: 7.48 10*3/MM3 (ref 3.5–10.8)
WHOLE BLOOD HOLD SPECIMEN: NORMAL
WHOLE BLOOD HOLD SPECIMEN: NORMAL

## 2018-06-13 PROCEDURE — 25010000002 HEPARIN (PORCINE) PER 1000 UNITS: Performed by: NURSE ANESTHETIST, CERTIFIED REGISTERED

## 2018-06-13 PROCEDURE — 25010000002 HEPARIN (PORCINE) PER 1000 UNITS

## 2018-06-13 PROCEDURE — 76377 3D RENDER W/INTRP POSTPROCES: CPT | Performed by: RADIOLOGY

## 2018-06-13 PROCEDURE — C1874 STENT, COATED/COV W/DEL SYS: HCPCS | Performed by: RADIOLOGY

## 2018-06-13 PROCEDURE — 03CG3ZZ EXTIRPATION OF MATTER FROM INTRACRANIAL ARTERY, PERCUTANEOUS APPROACH: ICD-10-PCS | Performed by: RADIOLOGY

## 2018-06-13 PROCEDURE — C1887 CATHETER, GUIDING: HCPCS | Performed by: RADIOLOGY

## 2018-06-13 PROCEDURE — 80047 BASIC METABLC PNL IONIZED CA: CPT

## 2018-06-13 PROCEDURE — 70450 CT HEAD/BRAIN W/O DYE: CPT

## 2018-06-13 PROCEDURE — A9577 INJ MULTIHANCE: HCPCS | Performed by: EMERGENCY MEDICINE

## 2018-06-13 PROCEDURE — 61645 PERQ ART M-THROMBECT &/NFS: CPT | Performed by: RADIOLOGY

## 2018-06-13 PROCEDURE — 25010000002 ALTEPLASE 2 MG RECONSTITUTED SOLUTION: Performed by: RADIOLOGY

## 2018-06-13 PROCEDURE — C1769 GUIDE WIRE: HCPCS | Performed by: RADIOLOGY

## 2018-06-13 PROCEDURE — 99285 EMERGENCY DEPT VISIT HI MDM: CPT

## 2018-06-13 PROCEDURE — 85610 PROTHROMBIN TIME: CPT

## 2018-06-13 PROCEDURE — 0 IODIXANOL PER 1 ML: Performed by: RADIOLOGY

## 2018-06-13 PROCEDURE — 85014 HEMATOCRIT: CPT

## 2018-06-13 PROCEDURE — 99291 CRITICAL CARE FIRST HOUR: CPT | Performed by: INTERNAL MEDICINE

## 2018-06-13 PROCEDURE — 93005 ELECTROCARDIOGRAM TRACING: CPT | Performed by: EMERGENCY MEDICINE

## 2018-06-13 PROCEDURE — 25010000002 FENTANYL CITRATE (PF) 100 MCG/2ML SOLUTION: Performed by: NURSE ANESTHETIST, CERTIFIED REGISTERED

## 2018-06-13 PROCEDURE — 3E06317 INTRODUCTION OF OTHER THROMBOLYTIC INTO CENTRAL ARTERY, PERCUTANEOUS APPROACH: ICD-10-PCS | Performed by: RADIOLOGY

## 2018-06-13 PROCEDURE — 25010000002 DEXAMETHASONE PER 1 MG: Performed by: NURSE ANESTHETIST, CERTIFIED REGISTERED

## 2018-06-13 PROCEDURE — 25010000002 NEOSTIGMINE 10 MG/10ML SOLUTION: Performed by: NURSE ANESTHETIST, CERTIFIED REGISTERED

## 2018-06-13 PROCEDURE — 71045 X-RAY EXAM CHEST 1 VIEW: CPT

## 2018-06-13 PROCEDURE — 25010000002 PROPOFOL 10 MG/ML EMULSION: Performed by: NURSE ANESTHETIST, CERTIFIED REGISTERED

## 2018-06-13 PROCEDURE — 99223 1ST HOSP IP/OBS HIGH 75: CPT | Performed by: NURSE PRACTITIONER

## 2018-06-13 PROCEDURE — 85025 COMPLETE CBC W/AUTO DIFF WBC: CPT | Performed by: EMERGENCY MEDICINE

## 2018-06-13 PROCEDURE — C1894 INTRO/SHEATH, NON-LASER: HCPCS | Performed by: RADIOLOGY

## 2018-06-13 PROCEDURE — 037G34Z DILATION OF INTRACRANIAL ARTERY WITH DRUG-ELUTING INTRALUMINAL DEVICE, PERCUTANEOUS APPROACH: ICD-10-PCS | Performed by: RADIOLOGY

## 2018-06-13 PROCEDURE — 25010000002 ONDANSETRON PER 1 MG: Performed by: NURSE ANESTHETIST, CERTIFIED REGISTERED

## 2018-06-13 PROCEDURE — 0 GADOBENATE DIMEGLUMINE 529 MG/ML SOLUTION: Performed by: EMERGENCY MEDICINE

## 2018-06-13 PROCEDURE — 25010000002 PHENYLEPHRINE PER 1 ML: Performed by: NURSE ANESTHETIST, CERTIFIED REGISTERED

## 2018-06-13 PROCEDURE — 85576 BLOOD PLATELET AGGREGATION: CPT | Performed by: NURSE PRACTITIONER

## 2018-06-13 PROCEDURE — 70546 MR ANGIOGRAPH HEAD W/O&W/DYE: CPT

## 2018-06-13 PROCEDURE — 82962 GLUCOSE BLOOD TEST: CPT

## 2018-06-13 DEVICE — XIENCE SIERRA™ EVEROLIMUS ELUTING CORONARY STENT SYSTEM 2.25 MM X 08 MM / RAPID-EXCHANGE
Type: IMPLANTABLE DEVICE | Status: FUNCTIONAL
Brand: XIENCE SIERRA™

## 2018-06-13 RX ORDER — FENTANYL CITRATE 50 UG/ML
INJECTION, SOLUTION INTRAMUSCULAR; INTRAVENOUS AS NEEDED
Status: DISCONTINUED | OUTPATIENT
Start: 2018-06-13 | End: 2018-06-13 | Stop reason: SURG

## 2018-06-13 RX ORDER — ONDANSETRON 2 MG/ML
4 INJECTION INTRAMUSCULAR; INTRAVENOUS ONCE AS NEEDED
Status: DISCONTINUED | OUTPATIENT
Start: 2018-06-13 | End: 2018-06-13 | Stop reason: HOSPADM

## 2018-06-13 RX ORDER — PROMETHAZINE HYDROCHLORIDE 25 MG/ML
6.25 INJECTION, SOLUTION INTRAMUSCULAR; INTRAVENOUS ONCE AS NEEDED
Status: DISCONTINUED | OUTPATIENT
Start: 2018-06-13 | End: 2018-06-13 | Stop reason: HOSPADM

## 2018-06-13 RX ORDER — SODIUM CHLORIDE 9 MG/ML
75 INJECTION, SOLUTION INTRAVENOUS CONTINUOUS
Status: DISCONTINUED | OUTPATIENT
Start: 2018-06-13 | End: 2018-06-15

## 2018-06-13 RX ORDER — GLYCOPYRROLATE 0.2 MG/ML
INJECTION INTRAMUSCULAR; INTRAVENOUS AS NEEDED
Status: DISCONTINUED | OUTPATIENT
Start: 2018-06-13 | End: 2018-06-13 | Stop reason: SURG

## 2018-06-13 RX ORDER — ONDANSETRON 2 MG/ML
INJECTION INTRAMUSCULAR; INTRAVENOUS AS NEEDED
Status: DISCONTINUED | OUTPATIENT
Start: 2018-06-13 | End: 2018-06-13 | Stop reason: SURG

## 2018-06-13 RX ORDER — OXYCODONE HYDROCHLORIDE AND ACETAMINOPHEN 5; 325 MG/1; MG/1
1 TABLET ORAL ONCE AS NEEDED
Status: DISCONTINUED | OUTPATIENT
Start: 2018-06-13 | End: 2018-06-13 | Stop reason: HOSPADM

## 2018-06-13 RX ORDER — ASPIRIN 81 MG/1
81 TABLET, CHEWABLE ORAL DAILY
Status: DISCONTINUED | OUTPATIENT
Start: 2018-06-14 | End: 2018-06-20 | Stop reason: HOSPADM

## 2018-06-13 RX ORDER — SODIUM CHLORIDE 9 MG/ML
100 INJECTION, SOLUTION INTRAVENOUS CONTINUOUS
Status: DISCONTINUED | OUTPATIENT
Start: 2018-06-13 | End: 2018-06-13

## 2018-06-13 RX ORDER — IODIXANOL 320 MG/ML
INJECTION, SOLUTION INTRAVASCULAR AS NEEDED
Status: DISCONTINUED | OUTPATIENT
Start: 2018-06-13 | End: 2018-06-13 | Stop reason: HOSPADM

## 2018-06-13 RX ORDER — ACETAMINOPHEN 325 MG/1
650 TABLET ORAL ONCE AS NEEDED
Status: DISCONTINUED | OUTPATIENT
Start: 2018-06-13 | End: 2018-06-13 | Stop reason: HOSPADM

## 2018-06-13 RX ORDER — CLOPIDOGREL BISULFATE 75 MG/1
75 TABLET ORAL DAILY
Status: DISCONTINUED | OUTPATIENT
Start: 2018-06-14 | End: 2018-06-20 | Stop reason: HOSPADM

## 2018-06-13 RX ORDER — SODIUM CHLORIDE 0.9 % (FLUSH) 0.9 %
10 SYRINGE (ML) INJECTION AS NEEDED
Status: DISCONTINUED | OUTPATIENT
Start: 2018-06-13 | End: 2018-06-13

## 2018-06-13 RX ORDER — SODIUM CHLORIDE, SODIUM LACTATE, POTASSIUM CHLORIDE, CALCIUM CHLORIDE 600; 310; 30; 20 MG/100ML; MG/100ML; MG/100ML; MG/100ML
INJECTION, SOLUTION INTRAVENOUS CONTINUOUS PRN
Status: DISCONTINUED | OUTPATIENT
Start: 2018-06-13 | End: 2018-06-13 | Stop reason: SURG

## 2018-06-13 RX ORDER — PROMETHAZINE HYDROCHLORIDE 25 MG/1
25 SUPPOSITORY RECTAL ONCE AS NEEDED
Status: DISCONTINUED | OUTPATIENT
Start: 2018-06-13 | End: 2018-06-13 | Stop reason: HOSPADM

## 2018-06-13 RX ORDER — ASPIRIN 81 MG/1
81 TABLET, CHEWABLE ORAL DAILY
Status: DISCONTINUED | OUTPATIENT
Start: 2018-06-13 | End: 2018-06-13

## 2018-06-13 RX ORDER — ROCURONIUM BROMIDE 10 MG/ML
INJECTION, SOLUTION INTRAVENOUS AS NEEDED
Status: DISCONTINUED | OUTPATIENT
Start: 2018-06-13 | End: 2018-06-13 | Stop reason: SURG

## 2018-06-13 RX ORDER — HEPARIN SODIUM 1000 [USP'U]/ML
INJECTION, SOLUTION INTRAVENOUS; SUBCUTANEOUS AS NEEDED
Status: DISCONTINUED | OUTPATIENT
Start: 2018-06-13 | End: 2018-06-13 | Stop reason: SURG

## 2018-06-13 RX ORDER — ONDANSETRON 2 MG/ML
4 INJECTION INTRAMUSCULAR; INTRAVENOUS EVERY 6 HOURS PRN
Status: DISCONTINUED | OUTPATIENT
Start: 2018-06-13 | End: 2018-06-20 | Stop reason: HOSPADM

## 2018-06-13 RX ORDER — SODIUM CHLORIDE 0.9 % (FLUSH) 0.9 %
1-10 SYRINGE (ML) INJECTION AS NEEDED
Status: DISCONTINUED | OUTPATIENT
Start: 2018-06-13 | End: 2018-06-20 | Stop reason: HOSPADM

## 2018-06-13 RX ORDER — CLOPIDOGREL BISULFATE 75 MG/1
75 TABLET ORAL DAILY
Status: DISCONTINUED | OUTPATIENT
Start: 2018-06-13 | End: 2018-06-13

## 2018-06-13 RX ORDER — IPRATROPIUM BROMIDE AND ALBUTEROL SULFATE 2.5; .5 MG/3ML; MG/3ML
3 SOLUTION RESPIRATORY (INHALATION) ONCE AS NEEDED
Status: DISCONTINUED | OUTPATIENT
Start: 2018-06-13 | End: 2018-06-13 | Stop reason: HOSPADM

## 2018-06-13 RX ORDER — ATORVASTATIN CALCIUM 40 MG/1
80 TABLET, FILM COATED ORAL NIGHTLY
Status: DISCONTINUED | OUTPATIENT
Start: 2018-06-13 | End: 2018-06-20 | Stop reason: HOSPADM

## 2018-06-13 RX ORDER — PROMETHAZINE HYDROCHLORIDE 25 MG/1
25 TABLET ORAL ONCE AS NEEDED
Status: DISCONTINUED | OUTPATIENT
Start: 2018-06-13 | End: 2018-06-13 | Stop reason: HOSPADM

## 2018-06-13 RX ORDER — ASPIRIN 81 MG/1
81 TABLET ORAL DAILY
Status: DISCONTINUED | OUTPATIENT
Start: 2018-06-13 | End: 2018-06-13 | Stop reason: CLARIF

## 2018-06-13 RX ORDER — NEOSTIGMINE METHYLSULFATE 1 MG/ML
INJECTION, SOLUTION INTRAVENOUS AS NEEDED
Status: DISCONTINUED | OUTPATIENT
Start: 2018-06-13 | End: 2018-06-13 | Stop reason: SURG

## 2018-06-13 RX ORDER — HYDROCODONE BITARTRATE AND ACETAMINOPHEN 5; 325 MG/1; MG/1
1 TABLET ORAL EVERY 4 HOURS PRN
Status: DISCONTINUED | OUTPATIENT
Start: 2018-06-13 | End: 2018-06-20 | Stop reason: HOSPADM

## 2018-06-13 RX ORDER — DEXAMETHASONE SODIUM PHOSPHATE 4 MG/ML
INJECTION, SOLUTION INTRA-ARTICULAR; INTRALESIONAL; INTRAMUSCULAR; INTRAVENOUS; SOFT TISSUE AS NEEDED
Status: DISCONTINUED | OUTPATIENT
Start: 2018-06-13 | End: 2018-06-13 | Stop reason: SURG

## 2018-06-13 RX ORDER — LIDOCAINE HYDROCHLORIDE 10 MG/ML
INJECTION, SOLUTION EPIDURAL; INFILTRATION; INTRACAUDAL; PERINEURAL AS NEEDED
Status: DISCONTINUED | OUTPATIENT
Start: 2018-06-13 | End: 2018-06-13 | Stop reason: SURG

## 2018-06-13 RX ORDER — LIDOCAINE HYDROCHLORIDE 10 MG/ML
INJECTION, SOLUTION EPIDURAL; INFILTRATION; INTRACAUDAL; PERINEURAL AS NEEDED
Status: DISCONTINUED | OUTPATIENT
Start: 2018-06-13 | End: 2018-06-13 | Stop reason: HOSPADM

## 2018-06-13 RX ORDER — VECURONIUM BROMIDE 1 MG/ML
INJECTION, POWDER, LYOPHILIZED, FOR SOLUTION INTRAVENOUS AS NEEDED
Status: DISCONTINUED | OUTPATIENT
Start: 2018-06-13 | End: 2018-06-13 | Stop reason: SURG

## 2018-06-13 RX ORDER — ACETAMINOPHEN 650 MG/1
650 SUPPOSITORY RECTAL ONCE AS NEEDED
Status: DISCONTINUED | OUTPATIENT
Start: 2018-06-13 | End: 2018-06-13 | Stop reason: HOSPADM

## 2018-06-13 RX ORDER — FENTANYL CITRATE 50 UG/ML
50 INJECTION, SOLUTION INTRAMUSCULAR; INTRAVENOUS
Status: DISCONTINUED | OUTPATIENT
Start: 2018-06-13 | End: 2018-06-13 | Stop reason: HOSPADM

## 2018-06-13 RX ORDER — PROPOFOL 10 MG/ML
VIAL (ML) INTRAVENOUS AS NEEDED
Status: DISCONTINUED | OUTPATIENT
Start: 2018-06-13 | End: 2018-06-13 | Stop reason: SURG

## 2018-06-13 RX ADMIN — HEPARIN SODIUM 12 UNITS/KG/HR: 10000 INJECTION, SOLUTION INTRAVENOUS at 11:32

## 2018-06-13 RX ADMIN — GLYCOPYRROLATE 0.2 MG: 0.2 INJECTION INTRAMUSCULAR; INTRAVENOUS at 13:31

## 2018-06-13 RX ADMIN — SODIUM CHLORIDE 5 MG/HR: 0.9 INJECTION, SOLUTION INTRAVENOUS at 14:15

## 2018-06-13 RX ADMIN — GADOBENATE DIMEGLUMINE 14 ML: 529 INJECTION, SOLUTION INTRAVENOUS at 10:45

## 2018-06-13 RX ADMIN — SODIUM CHLORIDE 100 ML/HR: 9 INJECTION, SOLUTION INTRAVENOUS at 11:32

## 2018-06-13 RX ADMIN — LIDOCAINE HYDROCHLORIDE 50 MG: 10 INJECTION, SOLUTION EPIDURAL; INFILTRATION; INTRACAUDAL; PERINEURAL at 12:12

## 2018-06-13 RX ADMIN — NEOSTIGMINE METHYLSULFATE 3 MG: 1 INJECTION, SOLUTION INTRAVENOUS at 14:05

## 2018-06-13 RX ADMIN — FENTANYL CITRATE 50 MCG: 50 INJECTION, SOLUTION INTRAMUSCULAR; INTRAVENOUS at 14:20

## 2018-06-13 RX ADMIN — VECURONIUM BROMIDE 1 MG: 1 INJECTION, POWDER, LYOPHILIZED, FOR SOLUTION INTRAVENOUS at 13:33

## 2018-06-13 RX ADMIN — VECURONIUM BROMIDE 2 MG: 1 INJECTION, POWDER, LYOPHILIZED, FOR SOLUTION INTRAVENOUS at 12:40

## 2018-06-13 RX ADMIN — SODIUM CHLORIDE, POTASSIUM CHLORIDE, SODIUM LACTATE AND CALCIUM CHLORIDE: 600; 310; 30; 20 INJECTION, SOLUTION INTRAVENOUS at 12:05

## 2018-06-13 RX ADMIN — SODIUM CHLORIDE 100 ML/HR: 9 INJECTION, SOLUTION INTRAVENOUS at 16:16

## 2018-06-13 RX ADMIN — ROCURONIUM BROMIDE 50 MG: 10 SOLUTION INTRAVENOUS at 12:12

## 2018-06-13 RX ADMIN — NICARDIPINE HYDROCHLORIDE 5 MG/HR: 0.1 INJECTION, SOLUTION INTRAVENOUS at 20:19

## 2018-06-13 RX ADMIN — PROPOFOL 200 MG: 10 INJECTION, EMULSION INTRAVENOUS at 12:12

## 2018-06-13 RX ADMIN — FENTANYL CITRATE 50 MCG: 50 INJECTION, SOLUTION INTRAMUSCULAR; INTRAVENOUS at 12:12

## 2018-06-13 RX ADMIN — SODIUM CHLORIDE, POTASSIUM CHLORIDE, SODIUM LACTATE AND CALCIUM CHLORIDE: 600; 310; 30; 20 INJECTION, SOLUTION INTRAVENOUS at 14:15

## 2018-06-13 RX ADMIN — PHENYLEPHRINE HYDROCHLORIDE 1 MCG/KG/MIN: 10 INJECTION INTRAVENOUS at 12:27

## 2018-06-13 RX ADMIN — ONDANSETRON 4 MG: 2 INJECTION INTRAMUSCULAR; INTRAVENOUS at 14:05

## 2018-06-13 RX ADMIN — DEXAMETHASONE SODIUM PHOSPHATE 8 MG: 4 INJECTION, SOLUTION INTRAMUSCULAR; INTRAVENOUS at 13:45

## 2018-06-13 RX ADMIN — HEPARIN SODIUM 5000 UNITS: 1000 INJECTION, SOLUTION INTRAVENOUS; SUBCUTANEOUS at 12:46

## 2018-06-13 RX ADMIN — GLYCOPYRROLATE 0.4 MG: 0.2 INJECTION INTRAMUSCULAR; INTRAVENOUS at 14:05

## 2018-06-13 RX ADMIN — NICARDIPINE HYDROCHLORIDE 5 MG/HR: 0.1 INJECTION, SOLUTION INTRAVENOUS at 16:22

## 2018-06-13 NOTE — ANESTHESIA PREPROCEDURE EVALUATION
Anesthesia Evaluation     Patient summary reviewed and Nursing notes reviewed   no history of anesthetic complications:  NPO Solid Status: Waived due to emergency  NPO Liquid Status: Waived due to emergency           Airway   Mallampati: II  TM distance: >3 FB  Neck ROM: full  No difficulty expected  Dental - normal exam     Pulmonary - negative pulmonary ROS and normal exam    breath sounds clear to auscultation  Cardiovascular - normal exam    ECG reviewed  PT is on anticoagulation therapy  Rhythm: regular  Rate: normal    (+) hypertension, CABG, PVD,       Neuro/Psych  (+) CVA,       ROS Comment: Vertebral artery vasospasm - slurred speech, ataxia  GI/Hepatic/Renal/Endo - negative ROS     Musculoskeletal (-) negative ROS    Abdominal    Substance History      OB/GYN          Other - negative ROS                       Anesthesia Plan    ASA 4 - emergent     general   (A-line)  intravenous induction   Anesthetic plan and risks discussed with patient.    Plan discussed with CRNA.

## 2018-06-13 NOTE — PLAN OF CARE
Neurointerventional Metrics    Last Known Well: 2 days prior (6/11/18)    [facility] Arrival:  0858    Initial NIHSS: 6    Baseline MRS:  1    IV tPA:  No, >4.5 hrs since LKW    CT Head: 0901    CT Perfusion: n/a    Arrival to Cath Lab:  1204    Groin Access: 1232    Guide Catheter Placement:  n/a    Microcatheter Placement:  n/a    Microcatheter Injection:  n/a    Stent Retriever Deployment:  1249 - 3 mg IA tPA and microcatheter clot disruption     Stent Retriever Removal: n/a    Reperfusion:  1302    Final Angiogram:   1358    End of Procedure:  1402    Initial TICI flow:  0    Final TICI flow:  3

## 2018-06-13 NOTE — H&P
Intensive Care Admission Note     Cerebrovascular accident (CVA)    History of Present Illness     This very nice 72-year-old woman with a history of previous strokes including vertebral artery occlusion who was recently here in May for angioplasty who had been feeling somewhat poorly this past week including generalized weakness as well as intermittent slurred speech. 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 sears. She successfully underwent intra-arterial tissue plasminogen activator, mechanical thrombectomy, and stenting with return of flow. NIH initially was 6. She is brought to the intensive care unit for postoperative management. The patient is able to articulate some words. She is answering all my questions appropriately noticed difficult to understand her completely and she is slow to answer.    Problem List, Surgical History, Family, Social History, and ROS     Patient Active Problem List   Diagnosis   • CVA (cerebral vascular accident)   • Hyperlipidemia   • Hypertension   • Chronic kidney disease   • Stroke- History   • Atherosclerotic cerebrovascular disease   • Vertebrobasilar artery stenosis   • Cerebrovascular accident (CVA), basilar artery occlusion, s/p TPA/thrombectomy/stenting     Past Surgical History:   Procedure Laterality Date   • CEREBRAL ANGIOGRAM     • CEREBRAL ANGIOGRAM N/A 5/22/2018    Procedure: Cerebral angiogram;  Surgeon: Gilberto Sears MD;  Location: [facility] CATH INVASIVE LOCATION;  Service: Interventional Radiology   • CORONARY ARTERY BYPASS GRAFT     • INTRACRANIAL ARTERY ANGIOPLASTY         No Known Allergies  No current facility-administered medications on file prior to encounter.      Current Outpatient Prescriptions on File Prior to Encounter   Medication Sig   • amLODIPine (NORVASC) 2.5 MG tablet Take 3 tablets by mouth  "Daily.   • aspirin 81 MG EC tablet Take 81 mg by mouth Daily.   • atorvastatin (LIPITOR) 80 MG tablet Take 1 tablet by mouth Every Night.   • clopidogrel (PLAVIX) 75 MG tablet Take 1 tablet by mouth Daily.         History reviewed. No pertinent family history.  Social History   Substance Use Topics   • Smoking status: Never Smoker   • Smokeless tobacco: Never Used   • Alcohol use No     Review of Systems  I am unable to complete a full review of systems secondary to the patient's difficulty commuting. However she does not keep she does not have any shortness of breath, chest pain, visual changes, or worsened weakness at this time.    Physical Exam and Clinical Information   /77   Pulse 70   Temp 98.3 °F (36.8 °C) (Axillary)   Resp 16   Ht 165.1 cm (65\")   Wt 71.2 kg (157 lb)   SpO2 99%   BMI 26.13 kg/m²   Physical Exam   Constitutional: She appears well-developed and well-nourished. No distress.   HENT:   Head: Normocephalic and atraumatic.   Nose: Nose normal.   Mouth/Throat: No oropharyngeal exudate.   Her tongue deviates to the right   Eyes: Pupils are equal, round, and reactive to light. Right eye exhibits no discharge. No scleral icterus.   Neck: Normal range of motion. Neck supple. No JVD present. Carotid bruit is not present. No tracheal deviation present. No thyromegaly present.   Cardiovascular: Normal rate, regular rhythm and normal heart sounds.  Exam reveals no friction rub.    No murmur heard.  Pulmonary/Chest: Effort normal and breath sounds normal. No stridor. No respiratory distress. She has no wheezes.   Abdominal: Soft. Bowel sounds are normal. She exhibits no distension and no mass. There is no tenderness. There is no rebound.   Musculoskeletal: She exhibits no edema.   She has equal  strength bilaterally however it is 3-4 out of 5.    Lymphadenopathy:     She has no cervical adenopathy.   Neurological: She is alert.   Mild dysarthria.   Skin: Skin is warm. Capillary refill takes " less than 2 seconds. No rash noted. She is not diaphoretic. No erythema.   Psychiatric: She has a normal mood and affect.   Nursing note and vitals reviewed.        Results from last 7 days  Lab Units 06/13/18  0929 06/13/18  0916   WBC 10*3/mm3 7.48  --    HEMOGLOBIN g/dL 16.2*  --    HEMOGLOBIN, POC g/dL  --  18.4*   PLATELETS 10*3/mm3 268  --        Results from last 7 days  Lab Units 06/13/18  0916   CREATININE mg/dL 0.90     Estimated Creatinine Clearance: 54.3 mL/min (by C-G formula based on SCr of 0.9 mg/dL).               I reviewed the patient's results and images.     Impression     Hospital Problem List     Cerebrovascular accident (CVA), basilar artery occlusion, s/p TPA/thrombectomy/stenting    Hyperlipidemia    Hypertension    Chronic kidney disease    Stroke- History            Plan/Recommendations     Admit to intensive care unit.  We will follow strict blood pressure control.  Follow-up labs will be ordered for tomorrow morning.  Strict neurological checks.  We will allow her to eat when she is a bit more awake.  She remains critically ill from cerebrovascular accident.    Eliu Stern MD, [facility]  Pulmonary and Critical Care Medicine  06/13/18 4:30 PM     Critical Care Time: 37 min (Not including time performing procedures)    CC: Milvia Frye MD

## 2018-06-13 NOTE — CONSULTS
NAME: LIDA MAZARIEGOS  : 1945  PCP: Milvia Frye MD  Attending MD: David Ahumada MD    Date of Admission:  2018  Date of Service: 2018    History of Present Illness:  72 y.o.  female -American female well known to the neuro interventional service with an angioplasty on  2018  By Dr. Sears. Since Monday, she has felt somewhat abnormal.  She tells me she has been taking her medications as prescribed including Plavix.  She tells me she has had difficulty speaking with a little bit of a slurred speech.  No appreciable weakness but she does report feeling weak all over.  No weakness greater on the right or left side.  No falls.  She had therapy yesterday at home.  Her friends evidently called EMS today when they thought she was a little less active and a little weaker than usual.  EMS brought the patient here for evaluation.  She reports a history of stroke as well as hypertension and chronic kidney disease.  The patient is unaccompanied at this time.  She speaking well but with mild dysarthria.    Review of Systems:  Constitutional: Negative for chills, fatigue, fever and unexpected weight change.   HENT: Negative for dental problem, ear pain, hearing loss and sinus pressure.    Eyes: Negative for pain and visual disturbance.   Respiratory: Negative for chest tightness and shortness of breath.    Cardiovascular: Negative for chest pain, palpitations and leg swelling.   Gastrointestinal: Negative for diarrhea, nausea and vomiting.   Genitourinary: Negative for difficulty urinating, dyspareunia, hematuria and urgency.   Musculoskeletal: Negative for myalgias, neck pain and neck stiffness.   Neurological: Positive for speech difficulty and weakness (generalized). Negative for seizures, syncope, light-headedness and headaches.   Psychiatric/Behavioral: Negative for confusion.   All other systems reviewed and are negative.    Past Medical History:  Past Medical History:   Diagnosis  Date   • Atherosclerotic cerebrovascular disease    • Chronic kidney disease    • Hyperlipidemia    • Hypertension    • Stroke     Multiple in September 2011 and Dec 2011       Past Surgical History:  Past Surgical History:   Procedure Laterality Date   • CEREBRAL ANGIOGRAM     • CEREBRAL ANGIOGRAM N/A 5/22/2018    Procedure: Cerebral angiogram;  Surgeon: Gilberto Sears MD;  Location: [facility] INVASIVE LOCATION;  Service: Interventional Radiology   • CORONARY ARTERY BYPASS GRAFT     • INTRACRANIAL ARTERY ANGIOPLASTY           Medications  Prescriptions Prior to Admission   Medication Sig Dispense Refill Last Dose   • amLODIPine (NORVASC) 10 MG tablet Take 10 mg by mouth Daily. Has been taking 7.5 mg daily (3 x 2.5 mg tablets)   Patient Taking Differently at Unknown time   • aspirin 81 MG EC tablet Take 81 mg by mouth Daily.      • atorvastatin (LIPITOR) 80 MG tablet Take 1 tablet by mouth Every Night. (Patient taking differently: Take 40 mg by mouth Every Night.) 30 tablet 2 Patient Taking Differently at Unknown time   • clopidogrel (PLAVIX) 75 MG tablet Take 1 tablet by mouth Daily. 30 tablet 2        Allergies:  No Known Allergies    Social Hx:  Social History     Social History   • Marital status: Single     Spouse name: N/A   • Number of children: N/A   • Years of education: N/A     Occupational History   • Not on file.     Social History Main Topics   • Smoking status: Never Smoker   • Smokeless tobacco: Never Used   • Alcohol use No   • Drug use: No   • Sexual activity: Defer     Other Topics Concern   • Not on file     Social History Narrative   • No narrative on file       Family Hx:  History reviewed. No pertinent family history.    Review of Imaging:  Ct Head Without Contrast    Result Date: 5/24/2018  EXAMINATION: CT HEAD WO CONTRAST-05/23/2018:  INDICATION: Stroke; Z74.09-Other reduced mobility.  TECHNIQUE: Axial CT of the head without intravenous contrast administration.  The radiation dose  "reduction device was turned on for each scan per the ALARA (As Low as Reasonably Achievable) protocol.  COMPARISON: MRI brain dated 05/22/2018.  FINDINGS: Midline structures are symmetric without evidence of mass, mass effect or midline shift. Ventricles and sulci within normal limits. Areas of encephalomalacia and chronic small vessel ischemic changes again noted within the periventricular white matter. No intra-axial hemorrhage or extra-axial fluid collection. Globes and orbits are unremarkable. The visualized paranasal sinuses and mastoid air cells are grossly clear and well pneumatized. Calvarium intact.      No acute intracranial abnormality specifically, no evidence for intra-axial hemorrhage.  D:  05/23/2018 E:  05/23/2018    This report was finalized on 5/24/2018 10:12 AM by Dr. Jared Martinez.      Mri Angiogram Head Without Contrast    Result Date: 5/24/2018  EXAMINATION: MRI ANGIOGRAM HEAD WO CONTRAST-  INDICATION: Basilar stenosis; Z74.09-Other reduced mobility.  TECHNIQUE: Intracranial MRA was performed with images acquired in the axial plane and displayed in the axial as well as the rotational and \"tumble\" projections.  COMPARISON: 05/22/2018.  FINDINGS: The anterior and middle cerebral artery circulations demonstrate minimal stenoses with no high-grade stenosis or occlusion and the appearance is unchanged when compared with 05/22/2018. 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 There has been recanalization of the right vertebral artery but with multifocal areas of high-grade stenosis and the basilar artery is patent but diffusely narrowed.      There has been recanalization of the right vertebral artery and of the basilar artery since priests examination of 05/22/2018. There are, however, multifocal high-grade stenosis in the right vertebral artery and the basilar artery is markedly attenuated.  D:  05/24/2018 E:  05/24/2018  This report was finalized on 5/24/2018 12:55 " PM by Dr. Rg Cary MD.      Mri Angiogram Head Without Contrast    Result Date: 5/22/2018  EXAMINATION: MRI ANGIOGRAM HEAD WO CONTRAST-  INDICATION: Stroke     TECHNIQUE: 3-D time-of-flight unenhanced images of the intracranial portions of the internal carotid arteries, basilar artery, [place] and major intracranial arteries with 3-D angiographic reconstructions.  COMPARISON: Brain MRA 6/14/2012  FINDINGS: Previous MRA exam from 2012 showed no apparent flow signal in left vertebral artery, but did show right vertebral artery signal, and relatively attenuated attenuated basilar artery flow signal, presumably as a result of patient's known stent.  On today's exam, however, there is no definite flow signal in the area of the basilar artery, and only weak discontinuous signal in the area of the right vertebral artery. As before, left vertebral artery is not visible.  Posterior cerebral arteries appear primarily supplied from the anterior circulation and appear stable from the prior study, with areas of attenuation proximally on the left along the midportion of the right posterior cerebral artery but no new stenosis.  The right and left ICA are normal, robust in appearance. Anterior and middle cerebral arteries are patent with mild diffuse irregularity of outline as on the previous study but no high-grade or new stenosis.       1. No significant stenosis of the anterior circulation. 2. Minimal flow signal in the expected area of the right vertebral artery compared to prior study, and flow signal no longer visible in the basilar artery. As on the previous study, no left vertebral flow signal. 3. Posterior cerebral arteries appear primarily supplied from the anterior circulation and remain normally patent.    This report was finalized on 5/22/2018 3:25 PM by DR. Neeraj Haddad MD.      Mri Brain Without Contrast    Result Date: 5/22/2018  EXAMINATION: MRI BRAIN WO CONTRAST-  INDICATION: Stroke     TECHNIQUE:  Sagittal and axial T1 and axial T2 FLAIR diffusion weighted images the brain without contrast  COMPARISON: Brain MRI 12/27/2011.  FINDINGS: History indicates patient was found by son with increased lethargy, slurred speech and right facial droop. Patient has prior history of basilar artery stent in 2011.  A couple of old posterior inferior infarcts are seen in the right and left cerebellum, and diffusion-weighted images show perhaps 15 or 20 punctate areas of diffusion restriction in both right and left cerebellum consistent with multiple acute infarcts, including a couple of infarcts of the cerebellar vermis. There are also subtle areas of weak diffusion restriction in the prashant, at least one of which is a fairly well-defined 4 mm infarct in the posterior right prashant just above the pontomedullary junction. No definite restricted diffusion is identified elsewhere.  Remaining sequences show subtle patchy edema generally corresponding to the areas of the acute cerebellar infarcts, and extensive, chronic appearing central white matter changes. There is no signal abnormality suggestive of hemorrhage no evidence of hydrocephalus mass or mass effect, or abnormal extra-axial collection. A small old anterior right pontine infarct is incidentally noted.       1. Old right and left posterior inferior cerebellar infarcts. 2. Numerous new punctate acute infarcts of both cerebellar hemispheres, and more subtle, but apparently acute infarcts of the prashant. 3. No acute intracranial abnormality is seen elsewhere.    This report was finalized on 5/22/2018 3:17 PM by DR. Neeraj Haddad MD.      Xr Chest 1 View    Result Date: 6/13/2018  EXAMINATION: XR CHEST 1 VW-06/13/2018:  INDICATION: Stroke Protocol (onset > 12 hrs).  COMPARISON: 12/29/2011.  FINDINGS: The cardiac silhouette is normal. There are postoperative cardiac changes. There is no acute inflammatory process, mass or effusion.         Postoperative cardiac changes with no acute  findings.  D:  06/13/2018 E:  06/13/2018        Ct Head Without Contrast Stroke Protocol    Result Date: 6/13/2018  EXAMINATION: CT HEAD WO CONTRAST STROKE PROTOCOL- 06/13/2018  INDICATION: Stroke     TECHNIQUE: CT scan of the head was performed at 5 mm intervals. No intravenous contrast was utilized.  The radiation dose reduction device was turned on for each scan per the ALARA (As Low as Reasonably Achievable) protocol.  COMPARISON: 05/23/2018  FINDINGS: There are low densities in each cerebellar hemisphere consistent with old infarcts. There are age-related periventricular white matter changes, slightly more prominent on the left than on the right. There is no intracranial mass, hemorrhage, midline shift or extra-axial fluid collection.      MRA of Brain today shows flow signal in left vertebral artery, but did show right vertebral artery signal, and relatively attenuated attenuated basilar artery flow signal, presumably as a result of patient's known stent.  On today's exam, however, there is no definite flow signal in the area of the basilar artery, and only weak discontinuous signal in the area of the right vertebral artery. As before, left vertebral artery is not visible.      I have reviewed the studies with Dr. Sears    Laboratory Result:  Lab Results   Component Value Date    WBC 7.48 06/13/2018    HGB 16.2 (H) 06/13/2018    HCT 50.8 (H) 06/13/2018    MCV 86.5 06/13/2018     06/13/2018     Lab Results   Component Value Date    GLUCOSE 100 05/24/2018    CALCIUM 8.7 05/24/2018     05/24/2018    K 4.2 05/24/2018    CO2 23.0 05/24/2018     05/24/2018    BUN 19 05/24/2018    CREATININE 0.90 06/13/2018    EGFRIFAFRI 85 05/24/2018    BCR 23.8 05/24/2018    ANIONGAP 6.0 05/24/2018     Lab Results   Component Value Date    HGBA1C 6.20 (H) 05/23/2018     Lab Results   Component Value Date    CHOL 201 (H) 05/23/2018    TRIG 81 05/23/2018    HDL 55 05/23/2018     (H) 05/23/2018       Physical  "Examination:  General Appearance:  Comfortable, ill-appearing and in no acute distress.    Vital signs: (most recent): Blood pressure 127/72, pulse 65, temperature 98.8 °F (37.1 °C), temperature source Axillary, resp. rate 14, height 165.1 cm (65\"), weight 71.2 kg (157 lb), SpO2 94 %.  No fever.    Output: Producing urine.    HEENT: Normal HEENT exam.    Lungs:  Normal effort and normal respiratory rate.  Breath sounds clear to auscultation.  She is not in respiratory distress.  No stridor.  No rales, decreased breath sounds, wheezes or rhonchi.    Heart: Normal rate.  Regular rhythm.  S1 normal and S2 normal.  No murmur or friction rub.   Chest: Symmetric chest wall expansion.   Abdomen: Abdomen is soft.  Bowel sounds are normal.   There is no abdominal tenderness.   There is no mass.   Extremities: Decreased range of motion.  There is no deformity or dependent edema.    Neurological: (Patient is alert. Mild dysarthria. Generalized weakness throughout all 4 extremities with no focality.).    Pupils:  Pupils are equal, round, and reactive to light.  Pupils are equal.   Skin:  Warm.  No rash        Neurological examination:  Interval: baseline  1a. Level of Consciousness: 0-->Alert, keenly responsive  1b. LOC Questions: 0-->Answers both questions correctly  1c. LOC Commands: 0-->Performs both tasks correctly  2. Best Gaze: 0-->Normal  3. Visual: 0-->No visual loss  4. Facial Palsy: 0-->Normal symmetrical movements  5a. Motor Arm, Left: 0-->No drift, limb holds 90 (or 45) degrees for full 10 secs  5b. Motor Arm, Right: 0-->No drift, limb holds 90 (or 45) degrees for full 10 secs  6a. Motor Leg, Left: 3-->No effort against gravity, leg falls to bed immediately  6b. Motor Leg, Right: 1-->Drift, leg falls by the end of the 5-sec period but does not hit bed  7. Limb Ataxia: 0-->Absent  8. Sensory: 1-->Mild-to-moderate sensory loss, patient feels pinprick is less sharp or is dull on the affected side, or there is a loss of " superficial pain with pinprick, but patient is aware of being touched  9. Best Language: 0-->No aphasia, normal  10. Dysarthria: 1-->Mild-to-moderate dysarthria, patient slurs at least some words and, at worst, can be understood with some difficulty  11. Extinction and Inattention (formerly Neglect): 0-->No abnormality    Total (NIH Stroke Scale): 6        Diagnoses/Plan:    Ms. Harmon is a 72 y.o. female who presents for evaluation of stroke symptoms. She was found to have a vertebrobasilar restenosis and was taken emergently to the cath lab for procedure.  She will then be admitted to the ICU for care.

## 2018-06-13 NOTE — SIGNIFICANT NOTE
06/13/18 1446   SLP Deferred Reason   SLP Deferred Reason Routine  (Order received, pt intubated and in surgery this pm. SLP to f/u tomorrow for pathway assessments if pt is appropraite. )

## 2018-06-13 NOTE — PLAN OF CARE
Problem: Stroke (Ischemic) (Adult)  Goal: Signs and Symptoms of Listed Potential Problems Will be Absent, Minimized or Managed (Stroke)  Outcome: Ongoing (interventions implemented as appropriate)

## 2018-06-13 NOTE — BRIEF OP NOTE
CAROTID CEREBRAL ANGIOGRAM BILATERAL  Progress Note    Jessy Harmon  6/13/2018    Pre-op Diagnosis:   Acute CVA, basilar occlusion       Post-Op Diagnosis Codes:   Acute CVA, basilar occlusion    Procedure/CPT® Codes:      Procedure(s):  Emergent neuro intervention, basilar artery occlusion    Surgeon(s):  MD Renzo Lovell MD    Anesthesia: * No anesthesia type entered *    Staff:   Scrub Person: Paulina Ferrell  Documenter: Abby Zuleta  Invasive Nurse: Wanda Moncada RN    Estimated Blood Loss: minimal    Urine Voided: * No values recorded between 6/13/2018 12:04 PM and 6/13/2018  2:23 PM *    Specimens:                None      Drains:   Urethral Catheter 16 Fr. (Active)       Findings: There is abrupt occlusion of the intracranial basilar artery, secondary to recurrent, critical (99%) stenosis involving the right vertebrobasilar junction (site of prior angioplasty).  This presents TICI 0 flow to the basilar artery.  Left vertebral artery is chronically occluded.  The basilar occlusion responded well to emergent neuro intervention with 3 mg intra-arterial TPA, wire/microcatheter thrombectomy, and angioplasty/stent placement (2.25 x 8 mm Xience drug-eluting stent), restoring normal flow to the posterior circulation (TICI 3).  Please note that the bilateral PCAs are supplied via the posterior communicating arteries (fetal-type circulation).    Complications: None apparent.      Gilberto Sears MD     Date: 6/13/2018  Time: 2:28 PM

## 2018-06-13 NOTE — ANESTHESIA PROCEDURE NOTES
Airway  Urgency: elective    Date/Time: 6/13/2018 12:12 PM  End Time:6/13/2018 12:15 PM  Airway not difficult    General Information and Staff    Patient location during procedure: OR  CRNA: GEOFF LEUNG    Indications and Patient Condition  Indications for airway management: airway protection    Preoxygenated: yes  MILS not maintained throughout  Mask difficulty assessment: 1 - vent by mask    Final Airway Details  Final airway type: endotracheal airway      Successful airway: ETT  Cuffed: yes   Successful intubation technique: direct laryngoscopy  Endotracheal tube insertion site: oral  Blade: Rubio  Blade size: #3  ETT size: 7.5 mm  Cormack-Lehane Classification: grade I - full view of glottis  Placement verified by: chest auscultation and capnometry   Measured from: lips  ETT to lips (cm): 22  Number of attempts at approach: 1    Additional Comments  Negative epigastric sounds, Breath sound equal bilaterally with symmetric chest rise and fall

## 2018-06-13 NOTE — ANESTHESIA POSTPROCEDURE EVALUATION
Patient: Jessy Harmon    Procedure Summary     Date:  06/13/18 Room / Location:  [facility] /  [facility]    Anesthesia Start:  1205 Anesthesia Stop:  1433    Procedure:  Carotid Cerebral Angiogram (Bilateral ) Diagnosis:      Provider:  Gilberto Sears MD Provider:  Art Bernal MD    Anesthesia Type:  general ASA Status:  4 - Emergent          Anesthesia Type: general  Last vitals  BP   127/86 (06/13/18 1430)   Temp   97.5 °F (36.4 °C) (06/13/18 1430)   Pulse   74 (06/13/18 1430)   Resp   16 (06/13/18 1430)     SpO2   93 % (06/13/18 1430)     Post Anesthesia Care and Evaluation    Patient location during evaluation: PACU  Patient participation: complete - patient participated  Level of consciousness: awake and alert  Pain score: 0  Pain management: adequate  Airway patency: patent  Anesthetic complications: No anesthetic complications  PONV Status: none  Cardiovascular status: hemodynamically stable and acceptable  Respiratory status: nonlabored ventilation, acceptable and nasal cannula  Hydration status: acceptable

## 2018-06-13 NOTE — ED PROVIDER NOTES
Subjective   This patient is a very nice 72-year-old -American female well known to the neuro interventional team with a recent procedure by Dr. Macias given in May 2018.  She tells me that over the last couple of days, since Monday, she has felt somewhat abnormal.  She tells me she has been taking her medications as prescribed including Plavix.  She tells me she has had difficulty speaking with a little bit of a slurred speech.  No appreciable weakness but she does report feeling weak all over.  No weakness greater on the right or left side.  No falls.  She had therapy yesterday at home.  Her friends evidently called EMS today when they thought she was a little less active and a little weaker than usual.  EMS brought the patient here for evaluation.  She reports a history of stroke as well as hypertension and chronic kidney disease.  The patient is unaccompanied at this time.  She speaking well but with mild dysarthria.    Past Medical History: HTN, CKD, athrosclerotic cerebrovascular disease, CVA, HLD       Past Family History: CVA (father and aunt)        History provided by:  Patient  Neurologic Problem   The patient's primary symptoms include weakness (generalized). The patient's pertinent negatives include no syncope. This is a new problem. The current episode started in the past 7 days. The neurological problem developed gradually. Last Known Well Instant: 2 days ago.  There was no focality noted. Pertinent negatives include no chest pain, confusion, fatigue, fever, headaches, light-headedness, nausea, neck pain, palpitations, shortness of breath or vomiting. Her past medical history is significant for a CVA.       Review of Systems   Constitutional: Negative for chills, fatigue, fever and unexpected weight change.   HENT: Negative for dental problem, ear pain, hearing loss and sinus pressure.    Eyes: Negative for pain and visual disturbance.   Respiratory: Negative for chest tightness and shortness  of breath.    Cardiovascular: Negative for chest pain, palpitations and leg swelling.   Gastrointestinal: Negative for diarrhea, nausea and vomiting.   Genitourinary: Negative for difficulty urinating, dyspareunia, hematuria and urgency.   Musculoskeletal: Negative for myalgias, neck pain and neck stiffness.   Neurological: Positive for speech difficulty and weakness (generalized). Negative for seizures, syncope, light-headedness and headaches.   Psychiatric/Behavioral: Negative for confusion.   All other systems reviewed and are negative.      Past Medical History:   Diagnosis Date   • Atherosclerotic cerebrovascular disease    • Chronic kidney disease    • Hyperlipidemia    • Hypertension    • Stroke     Multiple in September 2011 and Dec 2011       No Known Allergies    Past Surgical History:   Procedure Laterality Date   • CEREBRAL ANGIOGRAM     • CEREBRAL ANGIOGRAM N/A 5/22/2018    Procedure: Cerebral angiogram;  Surgeon: Gilberto Sears MD;  Location:  [facility];  Service: Interventional Radiology   • CORONARY ARTERY BYPASS GRAFT     • INTERVENTIONAL RADIOLOGY PROCEDURE Bilateral 6/13/2018    Procedure: Carotid Cerebral Angiogram;  Surgeon: Gilberto Sears MD;  Location:  [facility];  Service: Interventional Radiology   • INTRACRANIAL ARTERY ANGIOPLASTY         History reviewed. No pertinent family history.    Social History     Social History   • Marital status: Single     Social History Main Topics   • Smoking status: Never Smoker   • Smokeless tobacco: Never Used   • Alcohol use No   • Drug use: No   • Sexual activity: Defer     Other Topics Concern   • Not on file         Objective   Physical Exam   Constitutional: She is oriented to person, place, and time. She appears well-developed. No distress.   HENT:   Head: Normocephalic and atraumatic.   Right Ear: External ear normal.   Left Ear: External ear normal.   Nose: Nose normal.   Mouth/Throat: Oropharynx is clear and  moist.   Eyes: EOM are normal. Pupils are equal, round, and reactive to light.   Neck: Normal range of motion. Neck supple. No JVD present.   Cardiovascular: Normal rate, regular rhythm and normal heart sounds.  Exam reveals no gallop and no friction rub.    Pulmonary/Chest: Effort normal and breath sounds normal. She has no wheezes. She has no rales. She exhibits no tenderness.   Abdominal: Soft. Bowel sounds are normal. She exhibits no distension and no mass. There is no tenderness. There is no rebound and no guarding.   No signs of acute abdomen.  No pain at McBurney's point.  No pulsatile abdominal mass   Musculoskeletal: Normal range of motion. She exhibits no edema.   Lymphadenopathy:     She has no cervical adenopathy.   Neurological: She is alert and oriented to person, place, and time. A cranial nerve deficit is present. No sensory deficit. She exhibits normal muscle tone.   Mild right sided facial droop. 4/5 strength bilaterally with flexion and extension of fingers, wrist, elbows, knees and hips as well as plantar and dorsiflexion of the foot.   Skin: Skin is warm and dry. No erythema. No pallor.   Psychiatric: She has a normal mood and affect. Judgment and thought content normal.   Positive dysarthria    Nursing note and vitals reviewed.      Critical Care  Performed by: GEOFF LOPEZ  Authorized by: GEOFF LOPEZ     Critical care provider statement:     Critical care time (minutes):  60    Critical care time was exclusive of:  Separately billable procedures and treating other patients    Critical care was necessary to treat or prevent imminent or life-threatening deterioration of the following conditions:  CNS failure or compromise    Critical care was time spent personally by me on the following activities:  Evaluation of patient's response to treatment, examination of patient, obtaining history from patient or surrogate, ordering and performing treatments and interventions, ordering and  review of laboratory studies, ordering and review of radiographic studies, re-evaluation of patient's condition, development of treatment plan with patient or surrogate, pulse oximetry, discussions with consultants and review of old charts               ED Course  ED Course as of Jun 14 0906 Wed Jun 13, 2018   0908 I had a good conversation with the patient in the CT scan suite.  She complains of difficulty with speech and some generalized weakness ×2 days.  The symptoms did not start today.  Her friends became concerned about her today and therefore called EMS.  Neuro interventional team helped evaluate the patient in the CT scan suite as well.  They requested a CTA of the head which has been ordered.  Final disposition following joint determination by me and neuro interventional team.  Patient aware of the plan, differential diagnosis, and likelihood of admission.  Patient taking medications as prescribed.  No family at the bedside.  [YESSICA]   0909 CT had completed within several minutes the patient's arrival.  Dr. Rg Cary will be reading.  No official read as of this time.  I have reviewed independently do not see any evidence of acute bleed.  [YESSICA]   0918 I just discussed the case with Chloe, from the neuro interventional team.  She will be canceling the CTA of the head that she was requesting earlier in favor of an MRA, which the neuro team will order.  Patient will likely be admitted following results of studies.  Please see addendum dictation for details.  [YESSICA]   1125 Dr. Brandyn coolamined pt and discussed results of CT and transfer to cath lab. All questions were answered and pt is ready for cath lab.   [AT]   1127 Patient's CT scan of the head showed no evidence of acute abnormality.  I discussed personally with Dr. Rg Cary M.D.  Point-of-care chem 8, CBC and INR essentially unremarkable.  Chest x-ray showed no evidence of acute abnormalities.  NIH stroke scale was 6.  Dr. Gilberto Sears MD called me  after reviewing the MRA of the head.  He stated that he would like to take the patient to the Cath Lab after reviewing in comparing to the last image.  I discussed with the patient and her family at the bedside.  They are agreeable to the plan.  She has had no change in neurologic exam or clinical status.  She is speaking well and if anything appears somewhat less dysarthric than earlier.  The neuro interventional team is coming down to evaluate the patient here the bedside one final time before she is sent up to the Cath Lab.  She'll be admitted with an impression that includes stroke, acute, dysarthria, left lower extremity weakness.  Patient will be admitted to the ICU under Dr. De La Torre.  He has been paged.    [YESSICA]   6560 Dr. Ahumada discussed with intensivist Dr. De La Torre  [AT]      ED Course User Index  [AT] Germán Adan  [YESSICA] David Ahumada MD               NIHSS (NIH Stroke Scale/Score) reviewed and/or performed as part of the patient evaluation and treatment planning process.  The result associated with this review/performance is: 6           MDM  Number of Diagnoses or Management Options  Cerebrovascular accident (CVA), unspecified mechanism:   Dysarthria:   Weakness of left lower extremity:   Critical Care  Total time providing critical care: 30-74 minutes      Final diagnoses:   Cerebrovascular accident (CVA), unspecified mechanism   Dysarthria   Weakness of left lower extremity     EMR Dragon/Transcription disclaimer:  Much of this encounter note is an electronic transcription/translation of spoken language to printed text. The electronic translation of spoken language may permit erroneous, or at times, nonsensical words or phrases to be inadvertently transcribed; Although I have reviewed the note for such errors, some may still exist.   Documentation assistance provided by carlitos Adan.  Information recorded by the carlitos was done at my direction and has been verified and validated  by me.     Germán Chloe [facility]  06/13/18 0906       Germán Chloe [facility]  06/13/18 0917       Germán Chloe [facility]  06/13/18 1128       Germán Chloe [facility]  06/13/18 1130       Germán Chloe [facility]  06/13/18 1132       David Ahumada MD  06/14/18 0906

## 2018-06-14 ENCOUNTER — APPOINTMENT (OUTPATIENT)
Dept: GENERAL RADIOLOGY | Facility: HOSPITAL | Age: 73
End: 2018-06-14

## 2018-06-14 ENCOUNTER — APPOINTMENT (OUTPATIENT)
Dept: MRI IMAGING | Facility: HOSPITAL | Age: 73
End: 2018-06-14

## 2018-06-14 ENCOUNTER — ANCILLARY PROCEDURE (OUTPATIENT)
Dept: SPEECH THERAPY | Facility: HOSPITAL | Age: 73
End: 2018-06-14
Attending: RADIOLOGY

## 2018-06-14 ENCOUNTER — APPOINTMENT (OUTPATIENT)
Dept: CT IMAGING | Facility: HOSPITAL | Age: 73
End: 2018-06-14
Attending: RADIOLOGY

## 2018-06-14 LAB
ALBUMIN SERPL-MCNC: 3.88 G/DL (ref 3.2–4.8)
ALBUMIN/GLOB SERPL: 1.3 G/DL (ref 1.5–2.5)
ALP SERPL-CCNC: 105 U/L (ref 25–100)
ALT SERPL W P-5'-P-CCNC: 16 U/L (ref 7–40)
ANION GAP SERPL CALCULATED.3IONS-SCNC: 9 MMOL/L (ref 3–11)
ARTICHOKE IGE QN: 103 MG/DL (ref 0–130)
AST SERPL-CCNC: 15 U/L (ref 0–33)
BILIRUB SERPL-MCNC: 0.8 MG/DL (ref 0.3–1.2)
BUN BLD-MCNC: 16 MG/DL (ref 9–23)
BUN/CREAT SERPL: 19.3 (ref 7–25)
CALCIUM SPEC-SCNC: 9.1 MG/DL (ref 8.7–10.4)
CHLORIDE SERPL-SCNC: 107 MMOL/L (ref 99–109)
CHOLEST SERPL-MCNC: 152 MG/DL (ref 0–200)
CO2 SERPL-SCNC: 23 MMOL/L (ref 20–31)
CREAT BLD-MCNC: 0.83 MG/DL (ref 0.6–1.3)
DEPRECATED RDW RBC AUTO: 40.7 FL (ref 37–54)
ERYTHROCYTE [DISTWIDTH] IN BLOOD BY AUTOMATED COUNT: 13.1 % (ref 11.3–14.5)
GFR SERPL CREATININE-BSD FRML MDRD: 81 ML/MIN/1.73
GLOBULIN UR ELPH-MCNC: 2.9 GM/DL
GLUCOSE BLD-MCNC: 130 MG/DL (ref 70–100)
GLUCOSE BLDC GLUCOMTR-MCNC: 121 MG/DL (ref 70–130)
GLUCOSE BLDC GLUCOMTR-MCNC: 125 MG/DL (ref 70–130)
HBA1C MFR BLD: 6.1 % (ref 4.8–5.6)
HCT VFR BLD AUTO: 47.6 % (ref 34.5–44)
HDLC SERPL-MCNC: 45 MG/DL (ref 40–60)
HGB BLD-MCNC: 15.2 G/DL (ref 11.5–15.5)
MCH RBC QN AUTO: 27.3 PG (ref 27–31)
MCHC RBC AUTO-ENTMCNC: 31.9 G/DL (ref 32–36)
MCV RBC AUTO: 85.6 FL (ref 80–99)
PLATELET # BLD AUTO: 288 10*3/MM3 (ref 150–450)
PMV BLD AUTO: 9.7 FL (ref 6–12)
POTASSIUM BLD-SCNC: 3.7 MMOL/L (ref 3.5–5.5)
PROT SERPL-MCNC: 6.8 G/DL (ref 5.7–8.2)
RBC # BLD AUTO: 5.56 10*6/MM3 (ref 3.89–5.14)
SODIUM BLD-SCNC: 139 MMOL/L (ref 132–146)
TRIGL SERPL-MCNC: 45 MG/DL (ref 0–150)
WBC NRBC COR # BLD: 8.3 10*3/MM3 (ref 3.5–10.8)

## 2018-06-14 PROCEDURE — 92612 ENDOSCOPY SWALLOW (FEES) VID: CPT

## 2018-06-14 PROCEDURE — 80053 COMPREHEN METABOLIC PANEL: CPT | Performed by: RADIOLOGY

## 2018-06-14 PROCEDURE — 80061 LIPID PANEL: CPT | Performed by: RADIOLOGY

## 2018-06-14 PROCEDURE — 70450 CT HEAD/BRAIN W/O DYE: CPT

## 2018-06-14 PROCEDURE — 82962 GLUCOSE BLOOD TEST: CPT

## 2018-06-14 PROCEDURE — 83036 HEMOGLOBIN GLYCOSYLATED A1C: CPT | Performed by: RADIOLOGY

## 2018-06-14 PROCEDURE — 74018 RADEX ABDOMEN 1 VIEW: CPT

## 2018-06-14 PROCEDURE — 99233 SBSQ HOSP IP/OBS HIGH 50: CPT | Performed by: RADIOLOGY

## 2018-06-14 PROCEDURE — 92610 EVALUATE SWALLOWING FUNCTION: CPT

## 2018-06-14 PROCEDURE — 97162 PT EVAL MOD COMPLEX 30 MIN: CPT

## 2018-06-14 PROCEDURE — 25010000002 METOCLOPRAMIDE PER 10 MG: Performed by: NURSE PRACTITIONER

## 2018-06-14 PROCEDURE — 99233 SBSQ HOSP IP/OBS HIGH 50: CPT | Performed by: INTERNAL MEDICINE

## 2018-06-14 PROCEDURE — 70546 MR ANGIOGRAPH HEAD W/O&W/DYE: CPT

## 2018-06-14 PROCEDURE — 85027 COMPLETE CBC AUTOMATED: CPT | Performed by: RADIOLOGY

## 2018-06-14 PROCEDURE — 25010000002 ONDANSETRON PER 1 MG: Performed by: RADIOLOGY

## 2018-06-14 PROCEDURE — 92523 SPEECH SOUND LANG COMPREHEN: CPT

## 2018-06-14 RX ORDER — METOCLOPRAMIDE HYDROCHLORIDE 5 MG/ML
10 INJECTION INTRAMUSCULAR; INTRAVENOUS ONCE
Status: COMPLETED | OUTPATIENT
Start: 2018-06-14 | End: 2018-06-14

## 2018-06-14 RX ORDER — AMLODIPINE BESYLATE 10 MG/1
10 TABLET ORAL DAILY
COMMUNITY
Start: 2018-05-31 | End: 2018-06-20 | Stop reason: HOSPADM

## 2018-06-14 RX ADMIN — NICARDIPINE HYDROCHLORIDE 7.5 MG/HR: 0.1 INJECTION, SOLUTION INTRAVENOUS at 14:36

## 2018-06-14 RX ADMIN — AMLODIPINE BESYLATE 7.5 MG: 2.5 TABLET ORAL at 11:21

## 2018-06-14 RX ADMIN — METOCLOPRAMIDE 10 MG: 5 INJECTION, SOLUTION INTRAMUSCULAR; INTRAVENOUS at 20:25

## 2018-06-14 RX ADMIN — ONDANSETRON 4 MG: 2 INJECTION INTRAMUSCULAR; INTRAVENOUS at 09:24

## 2018-06-14 RX ADMIN — NICARDIPINE HYDROCHLORIDE 7.5 MG/HR: 0.1 INJECTION, SOLUTION INTRAVENOUS at 05:23

## 2018-06-14 RX ADMIN — ATORVASTATIN CALCIUM 80 MG: 40 TABLET, FILM COATED ORAL at 20:24

## 2018-06-14 RX ADMIN — NICARDIPINE HYDROCHLORIDE 2.5 MG/HR: 0.1 INJECTION, SOLUTION INTRAVENOUS at 00:11

## 2018-06-14 RX ADMIN — NICARDIPINE HYDROCHLORIDE 5 MG/HR: 0.1 INJECTION, SOLUTION INTRAVENOUS at 23:11

## 2018-06-14 RX ADMIN — SODIUM CHLORIDE 75 ML/HR: 9 INJECTION, SOLUTION INTRAVENOUS at 05:17

## 2018-06-14 RX ADMIN — ASPIRIN 81 MG CHEWABLE TABLET 81 MG: 81 TABLET CHEWABLE at 11:21

## 2018-06-14 RX ADMIN — CLOPIDOGREL BISULFATE 75 MG: 75 TABLET ORAL at 11:21

## 2018-06-14 RX ADMIN — HYDROCODONE BITARTRATE AND ACETAMINOPHEN 1 TABLET: 5; 325 TABLET ORAL at 20:25

## 2018-06-14 RX ADMIN — NICARDIPINE HYDROCHLORIDE 7.5 MG/HR: 0.1 INJECTION, SOLUTION INTRAVENOUS at 11:21

## 2018-06-14 RX ADMIN — NICARDIPINE HYDROCHLORIDE 7.5 MG/HR: 0.1 INJECTION, SOLUTION INTRAVENOUS at 17:42

## 2018-06-14 RX ADMIN — NICARDIPINE HYDROCHLORIDE 7.5 MG/HR: 0.1 INJECTION, SOLUTION INTRAVENOUS at 08:56

## 2018-06-14 RX ADMIN — NICARDIPINE HYDROCHLORIDE 7.5 MG/HR: 0.1 INJECTION, SOLUTION INTRAVENOUS at 20:28

## 2018-06-14 NOTE — THERAPY EVALUATION
Acute Care - Physical Therapy Initial Evaluation  [facility]     Patient Name: Jessy Harmon  : 1945  MRN: [id number]  Today's Date: 2018   Onset of Illness/Injury or Date of Surgery: 18  Date of Referral to PT: 18  Referring Physician: MD Renu      Admit Date: 2018    Visit Dx:     ICD-10-CM ICD-9-CM   1. Cerebrovascular accident (CVA), unspecified mechanism I63.9 434.91   2. Vertebrobasilar artery stenosis I65.1 433.30    I65.09    3. Dysarthria R47.1 784.51   4. Weakness of left lower extremity R29.898 729.89   5. Impaired functional mobility, balance, gait, and endurance Z74.09 V49.89   6. Oropharyngeal dysphagia R13.12 787.22     Patient Active Problem List   Diagnosis   • CVA (cerebral vascular accident)   • Hyperlipidemia   • Hypertension   • Chronic kidney disease   • Stroke- History   • Atherosclerotic cerebrovascular disease   • Vertebrobasilar artery stenosis   • Cerebrovascular accident (CVA), basilar artery occlusion, s/p TPA/thrombectomy/stenting     Past Medical History:   Diagnosis Date   • Atherosclerotic cerebrovascular disease    • Chronic kidney disease    • Hyperlipidemia    • Hypertension    • Stroke     Multiple in 2011 and Dec 2011     Past Surgical History:   Procedure Laterality Date   • CEREBRAL ANGIOGRAM     • CEREBRAL ANGIOGRAM N/A 2018    Procedure: Cerebral angiogram;  Surgeon: Gilberto Sears MD;  Location:  [facility];  Service: Interventional Radiology   • CORONARY ARTERY BYPASS GRAFT     • INTERVENTIONAL RADIOLOGY PROCEDURE Bilateral 2018    Procedure: Carotid Cerebral Angiogram;  Surgeon: Gilberto Sears MD;  Location:  [facility];  Service: Interventional Radiology   • INTRACRANIAL ARTERY ANGIOPLASTY          PT ASSESSMENT (last 12 hours)      Physical Therapy Evaluation     Row Name 18 1129          PT Evaluation Time/Intention    Subjective Information complains of;pain  -LS      Document Type evaluation  -LS     Mode of Treatment physical therapy  -LS     Patient Effort good  -LS     Symptoms Noted During/After Treatment other (see comments)  -LS     Comment Had been c/o double vision and dizziness earlier today; L eye patch in place to address symptoms.   -LS     Row Name 06/14/18 1129          General Information    Patient Profile Reviewed? yes  -LS     Onset of Illness/Injury or Date of Surgery 06/13/18  -LS     Referring Physician Given, MD  -LS     Patient Observations cooperative;agree to therapy  -LS     Prior Level of Function independent:;gait;transfer;ADL's;bathing;dressing  -LS     Equipment Currently Used at Home walker, rolling  -LS     Pertinent History of Current Functional Problem Admitted with generalized weakness and intermittent slurred speech. In ED, found to have acute CVA with occlusion of intracranial basilar art; now s/p intra-arterial tissue plasminogen activator, mechanical thrombectomy, and stenting on 6/13.   -LS     Existing Precautions/Restrictions fall   R-sided weakness  -LS     Risks Reviewed patient and family:;LOB;dizziness;increased discomfort;change in vital signs  -LS     Benefits Reviewed patient and family:;improve function;increase independence;increase strength;increase balance;increase knowledge  -LS     Barriers to Rehab medically complex  -LS     Row Name 06/14/18 1129          Relationship/Environment    Lives With grandchild(j carlos)   2 grandsons  -     Row Name 06/14/18 1129          Resource/Environmental Concerns    Current Living Arrangements home/apartment/condo  -     Row Name 06/14/18 1129          Cognitive Assessment/Interventions    Additional Documentation Cognitive Assessment/Intervention (Group)  -LS     Row Name 06/14/18 1129          Cognitive Assessment/Intervention- PT/OT    Orientation Status (Cognition) oriented x 4  -LS     Follows Commands (Cognition) follows one step commands;75-90% accuracy;increased processing time  needed;verbal cues/prompting required;physical/tactile prompts required  -     Cognitive Function (Cognitive) safety deficit  -     Safety Deficit (Cognitive) moderate deficit;insight into deficits/self awareness  -     Personal Safety Interventions fall prevention program maintained;gait belt;nonskid shoes/slippers when out of bed  -     Row Name 06/14/18 1129          Safety Issues, Functional Mobility    Impairments Affecting Function (Mobility) balance;cognition;coordination;grasp;muscle tone abnormal;sensation/sensory awareness;strength;pain  -[facility] Name 06/14/18 1129          Bed Mobility Assessment/Treatment    Bed Mobility Assessment/Treatment supine-sit  -     Supine-Sit Nicholas (Bed Mobility) maximum assist (25% patient effort);2 person assist;verbal cues  -     Assistive Device (Bed Mobility) bed rails;head of bed elevated;draw sheet  -[facility] Name 06/14/18 1129          Transfer Assessment/Treatment    Transfer Assessment/Treatment sit-stand transfer;stand-sit transfer;bed-chair transfer  -     Comment (Transfers) PT/tech on either side of pt for BUE support and blocking knees; transferred towards pt's L side.   -     Bed-Chair Nicholas (Transfers) maximum assist (25% patient effort);2 person assist;verbal cues  -     Sit-Stand Nicholas (Transfers) maximum assist (25% patient effort);2 person assist;verbal cues  -LS     Stand-Sit Nicholas (Transfers) maximum assist (25% patient effort);2 person assist;verbal cues  -[facility] Name 06/14/18 1129          Gait/Stairs Assessment/Training    Nicholas Level (Gait) unable to assess  -     Row Name 06/14/18 1129          General ROM    GENERAL ROM COMMENTS AAROM BLE WFL  -     Row Name 06/14/18 1129          MMT (Manual Muscle Testing)    Additional Documentation General Assessment (Manual Muscle Testing) (Group)  -[facility] Name 06/14/18 1129          General Assessment (Manual Muscle Testing)    General  Manual Muscle Testing (MMT) Assessment lower extremity strength deficits identified  -LS     Row Name 06/14/18 1129          Lower Extremity (Manual Muscle Testing)    Comment, MMT: Lower Extremity LLE grossly 3/5; RLE grossly 2+/5  -LS     Row Name 06/14/18 1129          Motor Assessment/Intervention    Additional Documentation Balance (Group)  -LS     Row Name 06/14/18 1129          Balance    Balance static sitting balance;static standing balance  -LS     Row Name 06/14/18 1129          Static Sitting Balance    Level of Christopher (Unsupported Sitting, Static Balance) moderate assist, 50 to 74% patient effort   post and R lateral lean  -LS     Sitting Position (Unsupported Sitting, Static Balance) sitting on edge of bed  -LS     Time Able to Maintain Position (Unsupported Sitting, Static Balance) 2 to 3 minutes  -LS     Row Name 06/14/18 1129          Static Standing Balance    Level of Christopher (Supported Standing, Static Balance) maximal assist, 25 to 49% patient effort   x2  -LS     Time Able to Maintain Position (Supported Standing, Static Balance) less than 15 seconds  -LS     Row Name 06/14/18 1129          Sensory Assessment/Intervention    Sensory General Assessment light touch sensation deficits identified  -LS     Row Name 06/14/18 1129          Light Touch Sensation Assessment    Left Lower Extremity: Light Touch Sensation Assessment intact  -LS     Right Lower Extremity: Light Touch Sensation Assessment moderate impairment, 50 to 74% correct responses   still able to localize LT but diminished on RLE  -LS     Row Name 06/14/18 1129          Pain Assessment    Additional Documentation Pain Scale: Numbers Pre/Post-Treatment (Group)  -LS     Row Name 06/14/18 1129          Pain Scale: Numbers Pre/Post-Treatment    Pain Scale: Numbers, Pretreatment 3/10  -LS     Pain Scale: Numbers, Post-Treatment 5/10  -LS     Pain Location abdomen  -LS     Pain Intervention(s) Repositioned;Ambulation/increased  activity  -LS     Row Name 06/14/18 1129          Plan of Care Review    Plan of Care Reviewed With patient;sibling  -LS     Row Name 06/14/18 1129          Physical Therapy Clinical Impression    Date of Referral to PT 06/13/18  -LS     PT Diagnosis (PT Clinical Impression) impaired functional mobility, balance, gait  -LS     Patient/Family Goals Statement (PT Clinical Impression) return to PLOF; increase strength  -LS     Criteria for Skilled Interventions Met (PT Clinical Impression) yes;treatment indicated  -LS     Rehab Potential (PT Clinical Summary) good, to achieve stated therapy goals  -LS     Care Plan Review (PT) evaluation/treatment results reviewed  -LS     Care Plan Review, Other Participant (PT Clinical Impression) sibling  -LS     Row Name 06/14/18 1129          Vital Signs    Pre Systolic BP Rehab 144  -LS     Pre Treatment Diastolic BP 80  -LS     Post Systolic BP Rehab 131  -LS     Post Treatment Diastolic BP 73  -LS     Pretreatment Heart Rate (beats/min) 69  -LS     Posttreatment Heart Rate (beats/min) 63  -LS     Pre SpO2 (%) 96  -LS     O2 Delivery Pre Treatment room air  -LS     Post SpO2 (%) 94  -LS     O2 Delivery Post Treatment room air  -LS     Pre Patient Position Supine  -LS     Intra Patient Position Standing  -LS     Post Patient Position Sitting  -LS     Row Name 06/14/18 1129          Physical Therapy Goals    Bed Mobility Goal Selection (PT) bed mobility, PT goal 1  -LS     Transfer Goal Selection (PT) transfer, PT goal 1  -LS     Gait Training Goal Selection (PT) gait training, PT goal 1  -     Row Name 06/14/18 1129          Bed Mobility Goal 1 (PT)    Activity/Assistive Device (Bed Mobility Goal 1, PT) sit to supine/supine to sit  -     [place] Level/Cues Needed (Bed Mobility Goal 1, PT) moderate assist (50-74% patient effort)  -     Time Frame (Bed Mobility Goal 1, PT) 2 weeks  -     Progress/Outcomes (Bed Mobility Goal 1, PT) goal ongoing  -     Row Name  06/14/18 1129          Transfer Goal 1 (PT)    Activity/Assistive Device (Transfer Goal 1, PT) sit-to-stand/stand-to-sit  -LS     [place] Level/Cues Needed (Transfer Goal 1, PT) moderate assist (50-74% patient effort)  -LS     Time Frame (Transfer Goal 1, PT) 2 weeks  -LS     Progress/Outcome (Transfer Goal 1, PT) goal ongoing  -LS     Row Name 06/14/18 1129          Gait Training Goal 1 (PT)    Activity/Assistive Device (Gait Training Goal 1, PT) gait (walking locomotion);other (see comments)   with approp AD  -LS     [place] Level (Gait Training Goal 1, PT) maximum assist (25-49% patient effort)  -LS     Distance (Gait Goal 1, PT) 5  -LS     Time Frame (Gait Training Goal 1, PT) 2 weeks  -LS     Progress/Outcome (Gait Training Goal 1, PT) goal ongoing  -LS     Row Name 06/14/18 1129          Positioning and Restraints    Pre-Treatment Position in bed  -LS     Post Treatment Position chair  -LS     In Chair notified nsg;reclined;call light within reach;encouraged to call for assist;exit alarm on;with family/caregiver;RUE elevated;LUE elevated;waffle cushion;on mechanical lift sling;legs elevated;heels elevated  -     Row Name 06/14/18 1129          Living Environment    Home Accessibility tub/shower is not walk in  -LS       User Key  (r) = Recorded By, (t) = Taken By, (c) = Cosigned By    Initials Name Provider Type    LS Piedad Tobias, PT Physical Therapist          Physical Therapy Education     Title: PT OT SLP Therapies (Active)     Topic: Physical Therapy (Active)     Point: Mobility training (Active)    Learning Progress Summary     Learner Status Readiness Method Response Comment Documented by    Patient Active Acceptance E,D NR  LS 06/14/18 1529    Family Active Acceptance E,D NR  LS 06/14/18 1529          Point: Home exercise program (Active)    Learning Progress Summary     Learner Status Readiness Method Response Comment Documented by    Patient Active Acceptance E,D NR   06/14/18 1523     Family Active Acceptance E,D NR  LS 06/14/18 1529          Point: Body mechanics (Active)    Learning Progress Summary     Learner Status Readiness Method Response Comment Documented by    Patient Active Acceptance E,D NR  LS 06/14/18 1529    Family Active Acceptance E,D NR  LS 06/14/18 1529          Point: Precautions (Active)    Learning Progress Summary     Learner Status Readiness Method Response Comment Documented by    Patient Active Acceptance E,D NR  LS 06/14/18 1529    Family Active Acceptance E,D NR  LS 06/14/18 1529                      User Key     Initials Effective Dates Name Provider Type Discipline     06/19/15 -  Piedad Tobias, PT Physical Therapist PT                PT Recommendation and Plan  Anticipated Discharge Disposition (PT): inpatient rehabilitation facility  Planned Therapy Interventions (PT Eval): balance training, bed mobility training, gait training, home exercise program, patient/family education, strengthening, transfer training  Therapy Frequency (PT Clinical Impression): daily  Outcome Summary/Treatment Plan (PT)  Anticipated Discharge Disposition (PT): inpatient rehabilitation facility  Plan of Care Reviewed With: patient, sibling  Outcome Summary: PT evaluation completed. Pt demonstrates RLE weakness with balance deficits and decreased indep re: functional mobility, warranting further skilled PT services to promote PLOF. Limited today by c/o abd pain and strong R lateral lean in sitting position. Recommend IP rehab at d/c.           Outcome Measures     Row Name 06/14/18 1129             How much help from another person do you currently need...    Turning from your back to your side while in flat bed without using bedrails? 2  -LS      Moving from lying on back to sitting on the side of a flat bed without bedrails? 2  -LS      Moving to and from a bed to a chair (including a wheelchair)? 2  -LS      Standing up from a chair using your arms (e.g., wheelchair, bedside chair)? 2   -LS      Climbing 3-5 steps with a railing? 1  -LS      To walk in hospital room? 1  -LS      AM-PAC 6 Clicks Score 10  -LS         Modified [place] Scale    Modified Giorgio Scale 4 - Moderately severe disability.  Unable to walk without assistance, and unable to attend to own bodily needs without assistance.  -LS         Functional Assessment    Outcome Measure Options AM-PAC 6 Clicks Basic Mobility (PT);Modified Giorgio  -LS        User Key  (r) = Recorded By, (t) = Taken By, (c) = Cosigned By    Initials Name Provider Type    HEIDY Tobias, PT Physical Therapist           Time Calculation:         PT Charges     Row Name 06/14/18 1129             Time Calculation    Start Time 1129  -LS      PT Received On 06/14/18  -      PT Goal Re-Cert Due Date 06/24/18  -        User Key  (r) = Recorded By, (t) = Taken By, (c) = Cosigned By    Initials Name Provider Type    HEIDY Tobias, PT Physical Therapist        Therapy Suggested Charges     Code   Minutes Charges    None           Therapy Charges for Today     Code Description Service Date Service Provider Modifiers Qty    77086121075 HC PT EVAL MOD COMPLEXITY 4 6/14/2018 Piedad Tobias, PT GP 1    84989214422 HC PT THER SUPP EA 15 MIN 6/14/2018 Piedad Tobias, PT GP 2          PT G-Codes  Outcome Measure Options: AM-PAC 6 Clicks Basic Mobility (PT), Darwin Tobias, PT  6/14/2018

## 2018-06-14 NOTE — MBS/VFSS/FEES
Acute Care - Speech Language Pathology   Swallow Initial Evaluation [facility]   Fiberoptic Endoscopic Evaluation of Swallowing (FEES)     Patient Name: Jessy Harmon  : 1945  MRN: [id number]  Today's Date: 2018  Onset of Illness/Injury or Date of Surgery: 18     Referring Physician: MD Renu      Admit Date: 2018    Visit Dx:     ICD-10-CM ICD-9-CM   1. Cerebrovascular accident (CVA), unspecified mechanism I63.9 434.91   2. Vertebrobasilar artery stenosis I65.1 433.30    I65.09    3. Dysarthria R47.1 784.51   4. Weakness of left lower extremity R29.898 729.89   5. Impaired functional mobility, balance, gait, and endurance Z74.09 V49.89   6. Oropharyngeal dysphagia R13.12 787.22     Patient Active Problem List   Diagnosis   • CVA (cerebral vascular accident)   • Hyperlipidemia   • Hypertension   • Chronic kidney disease   • Stroke- History   • Atherosclerotic cerebrovascular disease   • Vertebrobasilar artery stenosis   • Cerebrovascular accident (CVA), basilar artery occlusion, s/p TPA/thrombectomy/stenting     Past Medical History:   Diagnosis Date   • Atherosclerotic cerebrovascular disease    • Chronic kidney disease    • Hyperlipidemia    • Hypertension    • Stroke     Multiple in 2011 and Dec 2011     Past Surgical History:   Procedure Laterality Date   • CEREBRAL ANGIOGRAM     • CEREBRAL ANGIOGRAM N/A 2018    Procedure: Cerebral angiogram;  Surgeon: Gilberto Sears MD;  Location:  [facility];  Service: Interventional Radiology   • CORONARY ARTERY BYPASS GRAFT     • INTERVENTIONAL RADIOLOGY PROCEDURE Bilateral 2018    Procedure: Carotid Cerebral Angiogram;  Surgeon: Gilberto Sears MD;  Location:  [facility];  Service: Interventional Radiology   • INTRACRANIAL ARTERY ANGIOPLASTY            SWALLOW EVALUATION (last 72 hours)      SLP Adult Swallow Evaluation     Row Name 18 1345 18 1100                Rehab Evaluation     Document Type evaluation  -RD  --       Subjective Information complains of;weakness  -RD  --       Patient Observations cooperative;agree to therapy;lethargic  -RD  --       Patient/Family Observations Family present  -RD  --       Patient Effort adequate  -RD  --          General Information    Patient Profile Reviewed yes  -RD  --       Pertinent History Of Current Problem CVA. R wkns. s/p thrombectomy and stenting. see initial eval  -RD  --       Current Method of Nutrition NPO  -RD NPO  -RD       Precautions/Limitations, Vision vision impairment, bilaterally;vision impairment, left  -RD  --       Precautions/Limitations, Hearing WFL;for purposes of eval  -RD  --       Prior Level of Function-Communication WFL  -RD WFL  -RD       Prior Level of Function-Swallowing no diet consistency restrictions  -RD no diet consistency restrictions  -RD       Plans/Goals Discussed with patient;family;agreed upon  -RD  --       Barriers to Rehab medically complex  -RD  --       Patient's Goals for Discharge return to PO diet  -RD return to PO diet  -RD       Family Goals for Discharge patient able to return to PO diet  -RD patient able to return to PO diet  -RD          Pain Scale: FACES Pre/Post-Treatment    Pain: FACES Scale, Pretreatment 2-->hurts little bit  -RD  --       Pain: FACES Scale, Post-Treatment 2-->hurts little bit  -RD  --          Oral Motor and Function    Secretion Management  -- anterior loss  -RD       Volitional Swallow  -- delayed  -RD       Volitional Cough  -- weak  -RD          General Eating/Swallowing Observations    Eating/Swallowing Skills  -- fed by SLP  -RD       Positioning During Eating  -- upright 90 degree;upright in bed  -RD       Utensils Used  -- spoon;cup;straw  -RD       Consistencies Trialed  -- thin liquids;pudding thick  -RD          Clinical Swallow Eval    Oral Prep Phase  -- impaired  -RD       Oral Transit  -- impaired  -RD       Oral Residue  -- impaired  -RD       Pharyngeal  Phase  -- suspected pharyngeal impairment  -RD       Esophageal Phase  -- unremarkable  -RD       Clinical Swallow Evaluation Summary  -- BS eval complete. Anterior loss of secretions on R. R labial droop and lingual weakness. Incr'd prep and transit w/ all trials, w/ inconsistent anterior loss of PO. Swallow was delayed per palpation. Weak cough. Inconsistent multiple swallows w/ pureed, but otherwise no s/s observed. Need to r/o pharyngeal dysphagia.   -RD          Oral Prep Concerns    Oral Prep Concerns  -- anterior loss;increased prep time;incomplete or weak lip closure around spoon  -RD       Incomplete or Weak Lip Closure Around Spoon  -- all consistencies  -RD       Anterior Loss  -- all consistencies  -RD       Increased Prep Time  -- all consistencies  -RD          Oral Transit Concerns    Oral Transit Concerns  -- increased oral transit time  -RD       Increased Oral Transit Time  -- thin;pudding  -RD          Oral Residue Concerns    Oral Residue Concerns  -- lateral sulcus residue, right  -RD       Lateral Sulcus Residue, Right  -- pudding  -RD          Pharyngeal Phase Concerns    Pharyngeal Phase Concerns  -- multiple swallows  -RD       Multiple Swallows  -- pudding;other (see comments)   inconsistently  -RD          FEES Interpretation    Oral Phase anterior loss;prolonged manipulation;increased A-P transit time;prespill of liquids into pharynx  -RD  --       Oral Phase, Comment Anterior loss w/ thins. Incr'd prep & transit w/ all consistencies. Pre-spill of both thins and pudding.   -RD  --          Initiation of Pharyngeal Swallow    Initiation of Pharyngeal Swallow bolus in pyriform sinuses  -RD  --       Pharyngeal Phase impaired pharyngeal phase of swallowing  -RD  --       Penetration Before the Swallow thin liquids;pudding/puree;secondary to reduced back of tongue control;secondary to delayed swallow initiation or mistiming  -RD  --       Aspiration Before the Swallow thin liquids;secondary  to reduced back of tongue control;secondary to delayed swallow initiation or mistiming  -RD  --       Penetration During the Swallow pudding/puree;secondary to delayed swallow initiation or mistiming;secondary to reduced laryngeal elevation;secondary to reduced vestibular closure  -RD  --       Aspiration During the Swallow thin liquids;pudding/puree;secondary to delayed swallow initiation or mistiming;secondary to reduced laryngeal elevation;secondary to reduced vestibular closure  -RD  --       Aspiration After the Swallow thin liquids;pudding/puree;secondary to residue;in valleculae;in pyriform sinuses;in laryngeal vestibule  -RD  --       Response to Aspiration weak cough/throat clear;inconsistent response;could not clear subglottic material  -RD  --       Rosenbek's Scale thin:;pudding/puree:;7-->Level 7;8-->Level 8  -RD  --       Residue thin liquids;pudding/puree;base of tongue;valleculae;pyriform sinuses;posterior pharyngeal wall;laryngeal vestibule;diffuse within pharynx;secondary to reduced base of tongue retraction;secondary to reduced posterior pharyngeal wall stripping;secondary to reduced laryngeal elevation;secondary to reduced hyolaryngeal excursion;other (see comments)   R wkns>L  -RD  --       Response to Residue unable to clear residue;with spontaneous subsequent swallow;with compensatory maneuver (see comments)  -RD  --       Attempted Compensatory Maneuvers bolus size;bolus presentation style;head turn to right;multiple swallows;throat clear after swallow  -RD  --       Response to Attempted Compensatory Maneuvers did not prevent aspiration;did not reduce residue  -RD  --       FEES Summary Severe oropharyngeal dysphagia. Pt w/ wet vocal quality this PM prior to PO trials (not present during bedside). Secretions present diffusely in pharynx and on VFs. Pt eventually aspirated secretions. Trials of thins and pudding given. Swallow initiation was significantly delayed. Aspiration occurred  before/during/after w/ thins and penetration before w/ resulting aspiration during/after w/ pudding 2' impaired timing, reduced vestibular closure, and diffuse pharyngeal weakness. R sided weakness > L (essentially flaccid in R side of pharynx). Pt inconsistently sensed aspiration w/ weak throat clear/cough, but was unsuccessful to clear subglottic material. Moderate-severe pharyngeal residue w/ all consistencies 2' reduced base of tongue retraction, decr'd pharyngeal stripping, and decr'd elevation/excursion. Attempted multiple compensatory strategies which were unsuccessful to clear sublgottic material, reduce residue or prevent aspiration.   -RD  --          Clinical Impression    SLP Swallowing Diagnosis severe;oral dysfunction;pharyngeal dysfunction  -RD moderate;oral dysfunction;suspected pharyngeal dysfunction  -RD       Functional Impact risk of aspiration/pneumonia;risk of malnutrition;risk of dehydration  -RD risk of aspiration/pneumonia  -RD       Rehab Potential/Prognosis, Swallowing good, to achieve stated therapy goals  -RD good, to achieve stated therapy goals  -RD       Criteria for Skilled Therapeutic Interventions Met demonstrates skilled criteria  -RD demonstrates skilled criteria  -RD          Recommendations    Therapy Frequency (Swallow) 5 days per week  -RD evaluation only;PRN  -RD       Predicted Duration Therapy Intervention (Days) until discharge  -RD  --       SLP Diet Recommendation NPO;temporary alternate methods of nutrition/hydration  -RD NPO  -RD       Recommended Diagnostics  -- FEES  -RD       Recommended Precautions and Strategies other (see comments)   Oral care BID and PRN, aspiration precautions  -RD  --       SLP Rec. for Method of Medication Administration meds via alternate route  -RD meds via alternate route  -RD       Anticipated Dischage Disposition anticipate therapy at next level of care  -RD  --         User Key  (r) = Recorded By, (t) = Taken By, (c) = Cosigned By     Initials Name Effective Dates    RD Sary Cariasnereidasonia, MS CCC-SLP 04/03/18 -         EDUCATION  The patient has been educated in the following areas:   Dysphagia (Swallowing Impairment) Oral Care/Hydration NPO rationale.    SLP Recommendation and Plan  SLP Swallowing Diagnosis: severe, oral dysfunction, pharyngeal dysfunction  SLP Diet Recommendation: NPO, temporary alternate methods of nutrition/hydration  Recommended Precautions and Strategies: other (see comments) (Oral care BID and PRN, aspiration precautions)        Recommended Diagnostics: FEES  Criteria for Skilled Therapeutic Interventions Met: demonstrates skilled criteria  Anticipated Dischage Disposition: anticipate therapy at next level of care  Rehab Potential/Prognosis, Swallowing: good, to achieve stated therapy goals  Therapy Frequency (Swallow): 5 days per week  Predicted Duration Therapy Intervention (Days): until discharge       Plan of Care Reviewed With: patient, family  Plan of Care Review  Plan of Care Reviewed With: patient, family  Progress: improving          SLP GOALS     Row Name 06/14/18 1345 06/14/18 1100          Oral Nutrition/Hydration Goal 1 (SLP)    Oral Nutrition/Hydration Goal 1, SLP LTG: Pt will improve swallowing skills to begin to take some PO safely w/ 100% accuracy w/o cues.   -RD  --     Time Frame (Oral Nutrition/Hydration Goal 1, SLP) by discharge  -RD  --        Oral Nutrition/Hydration Goal 2 (SLP)    Oral Nutrition/Hydration Goal 2, SLP Pt will tolerate ice chip/thin trials post oral care w/ no overt s/s of aspiration w/ 70% accuracy w/o cues.   -RD  --     Time Frame (Oral Nutrition/Hydration Goal 2, SLP) short term goal (STG);by discharge  -RD  --        Labial Strengthening Goal 1 (SLP)    Activity (Labial Strengthening Goal 1, SLP) increase labial tone;increase labial sensation/afferent drive  -RD  --     Increase Labial Tone labial resistance exercises  -RD  --     Increase Labial Sensation/Afferent Drive swallow  trials  -RD  --     Benzie/Accuracy (Labial Strengthening Goal 1, SLP) with minimal cues (75-90% accuracy)  -RD  --     Time Frame (Labial Strengthening Goal 1, SLP) short term goal (STG);by discharge  -RD  --        Lingual Strengthening Goal 1 (SLP)    Activity (Lingual Strengthening Goal 1, SLP) increase lingual tone/sensation/control/coordination/movement;increase tongue back strength;increase buccal tension or tone  -RD  --     Increase Lingual Tone/Sensation/Control/Coordination/Movement lingual resistance exercises  -RD  --     Increase Buccal Tension or Tone swallow trials  -RD  --     Increase Tongue Back Strength lingual resistance exercises  -RD  --     Time Frame (Lingual Strengthening Goal 1, SLP) short term goal (STG);by discharge  -RD  --        Pharyngeal Strengthening Exercise Goal 1 (SLP)    Activity (Pharyngeal Strengthening Goal 1, SLP) increase timing;increase superior movement of the hyolaryngeal complex;increase anterior movement of the hyolaryngeal complex;increase closure at entrance to airway/closure of airway at glottis;increase tongue base retraction  -RD  --     Increase Timing prepping - 3 second prep or suck swallow or 3-step swallow  -RD  --     Increase Superior Movement of the Hyolaryngeal Complex effortful pitch glide (falsetto + pharyngeal squeeze)  -RD  --     Increase Anterior Movement of the Hyolaryngeal Complex chin tuck against resistance (CTAR)  -RD  --     Increase Closure at Entrance to Airway/Closure of Airway at Glottis super-supraglottic swallow  -RD  --     Increase Tongue Base Retraction hard effortful swallow  -RD  --     Benzie/Accuracy (Pharyngeal Strengthening Goal 1, SLP) with minimal cues (75-90% accuracy)  -RD  --     Time Frame (Pharyngeal Strengthening Goal 1, SLP) short term goal (STG);by discharge  -RD  --        Reduce Perception of Hypernasality Goal 1 (SLP)    Reduce Perception of Hypernasality By Goal 1 (SLP)  -- reducing rate;opening mouth  wider for speech;80%;with minimal cues (75-90%)  -RD     Time Frame (Perception of Hypernasality Goal 1, SLP)  -- short term goal (STG);by discharge  -RD        Articulation Goal 1 (SLP)    Improve Articulation Goal 1 (SLP)  -- of specific sounds in words;of specific sounds in phrases;by over-articulating at word level;by over-articulating at phrase level;80%;with minimal cues (75-90%)  -RD     Time Frame (Articulation Goal 1, SLP)  -- short term goal (STG);by discharge  -RD        Additional Goal 1 (SLP)    Additional Goal 1, SLP  -- LTG: Pt will improve communication skills to actively participate in care w/ 90% accuracy w/o cues.   -RD     Time Frame (Additional Goal 1, SLP)  -- by discharge  -RD       User Key  (r) = Recorded By, (t) = Taken By, (c) = Cosigned By    Initials Name Provider Type    ARACELI Florian MS CCC-SLP Speech and Language Pathologist               Time Calculation:         Time Calculation- SLP     Row Name 06/14/18 1519 06/14/18 1222          Time Calculation- SLP    SLP Start Time 1345  -RD 1100  -RD     SLP Received On 06/14/18  -RD 06/14/18  -RD       User Key  (r) = Recorded By, (t) = Taken By, (c) = Cosigned By    Initials Name Provider Type    ARACELI Florian MS CCC-SLP Speech and Language Pathologist          Therapy Charges for Today     Code Description Service Date Service Provider Modifiers Qty    10094062276 HC ST EVAL ORAL PHARYNG SWALLOW 3 6/14/2018 MS AFIA Andre GN 1    65370543152 HC ST EVAL SPEECH AND PROD W LANG  3 6/14/2018 MS AFIA Andre GN 1    78934522542 HC ST FIBEROPTIC ENDO EVAL SWALL 7 6/14/2018 MS AFIA Andre GN 1               MS AFIA Andre  6/14/2018

## 2018-06-14 NOTE — PROGRESS NOTES
"Intensive Care Follow-up     Hospital:  LOS: 1 day   Ms. Jessy Harmon, 72 y.o. female is followed for:   Cerebrovascular accident (CVA)        Subjective   Interval History:  The chart is been reviewed. The patient has remained hemodynamically stable overnight. There is no new focal weakness. She is awaiting a speech evaluation.    The patient's relevant past medical, surgical and social history were reviewed and updated in Epic as appropriate.        Objective     Infusions:    niCARdipine 5-15 mg/hr Last Rate: 7.5 mg/hr (06/14/18 1121)   phenylephrine (BALA-SYNEPHRINE) 50 mg/250 (0.2 mg/mL) infusion 0.5-3 mcg/kg/min    sodium chloride 75 mL/hr Last Rate: 75 mL/hr (06/14/18 0517)     Medications:    amLODIPine 7.5 mg Oral Q24H   aspirin 81 mg Oral Daily   atorvastatin 80 mg Oral Nightly   clopidogrel 75 mg Oral Daily       Vital Sign Min/Max for last 24 hours  Temp  Min: 97.5 °F (36.4 °C)  Max: 98.8 °F (37.1 °C)   BP  Min: 91/69  Max: 158/89   Pulse  Min: 58  Max: 82   Resp  Min: 14  Max: 18   SpO2  Min: 93 %  Max: 100 %   Flow (L/min)  Min: 2  Max: 3       Input/Output for last 24 hour shift  06/13 0701 - 06/14 0700  In: 2748.7 [I.V.:2748.7]  Out: 1400 [Urine:1400]      Objective:  General Appearance:  Comfortable, ill-appearing and in no acute distress.    Vital signs: (most recent): Blood pressure 127/72, pulse 65, temperature 98.8 °F (37.1 °C), temperature source Axillary, resp. rate 14, height 165.1 cm (65\"), weight 71.2 kg (157 lb), SpO2 94 %.  No fever.    Output: Producing urine.    HEENT: Normal HEENT exam.    Lungs:  Normal effort and normal respiratory rate.  Breath sounds clear to auscultation.  She is not in respiratory distress.  No stridor.  No rales, decreased breath sounds, wheezes or rhonchi.    Heart: Normal rate.  Regular rhythm.  S1 normal and S2 normal.  No murmur or friction rub.   Chest: Symmetric chest wall expansion.   Abdomen: Abdomen is soft.  Bowel sounds are normal.   There is no " abdominal tenderness.   There is no mass.   Extremities: Decreased range of motion.  There is no deformity or dependent edema.    Neurological: (Patient is alert. Mild dysarthria. Generalized weakness throughout all 4 extremities with no focality.).    Pupils:  Pupils are equal, round, and reactive to light.  Pupils are equal.   Skin:  Warm.  No rash.               Results from last 7 days  Lab Units 06/14/18  0512 06/13/18  0929 06/13/18  0916   WBC 10*3/mm3 8.30 7.48  --    HEMOGLOBIN g/dL 15.2 16.2*  --    HEMOGLOBIN, POC g/dL  --   --  18.4*   PLATELETS 10*3/mm3 288 268  --        Results from last 7 days  Lab Units 06/14/18  0512 06/13/18  0916   SODIUM mmol/L 139  --    POTASSIUM mmol/L 3.7  --    CO2 mmol/L 23.0  --    BUN mg/dL 16  --    CREATININE mg/dL 0.83 0.90   GLUCOSE mg/dL 130*  --      Estimated Creatinine Clearance: 58.9 mL/min (by C-G formula based on SCr of 0.83 mg/dL).          I reviewed the patient's results and images.     Assessment/Plan   Impression      Principal Problem:    Cerebrovascular accident (CVA), basilar artery occlusion, s/p TPA/thrombectomy/stenting  Active Problems:    Hyperlipidemia    Hypertension    Chronic kidney disease    Stroke- History       Plan        Continue physical therapy.  Speech evaluation today.  Continue with close blood pressure monitoring.  Continue antiplatelet therapy.  She will remain in the ICU for close monitoring today.  Follow-up orders and labs a been placed for tomorrow morning.    Plan of care and goals reviewed with mulitdisciplinary team at daily rounds.   I discussed the patient's findings and my recommendations with patient and nursing staff       Eliu Stern MD, [facility]  Pulmonary and Critical Care Medicine  06/14/18 1:00 PM

## 2018-06-14 NOTE — PROGRESS NOTES
NAME: LIDA MAZARIEGOS  : 1945  PCP: Milvia Frye MD  ADMITTING PHYSICIAN: Светлана De La Torre DO    DATE OF ADMISSION:  2018  DATE OF SERVICE: 2018    HOSPITAL DAY:  1 day    HISTORY OF PRESENT ILLNESS:  72 y.o. female well known to the Neurointerventional service, having been treated on multiple occasions for advanced intracranial atherosclerotic disease (ICAD) and prior basilar occlusion X 2.  She's had angioplasty/stent placement involving her intracranial right vertebral artery greater than 5 years ago (Wingspan & Xience).  She did very well for several years, but presented to [facility] in mid-May 2018 for recurrent ICAD and basilar thrombosis.  She was transferred to [facility], and underwent emergent neurointervention with IA tPA/thrombectomy and angioplasty of critical stenosis of right VB junction on 18.  This represented progression of disease, as her prior stents were widely patent.  She was restarted on Plavix/ASA regimen, and made an excellent recovery.      She re-presented to [facility] emergency department on 2018 with a 2 day history of worsening speech difficulties and worsening left lower extremity weakness. Given her sub--acute presentation, with symptom onset occurring 2 days prior, she was deemed not a candidate for IV TPA therapy.  She did undergo MR angiography of the head, which demonstrated reocclusion of her basilar artery.  Given concern for further progression of symptoms and worsening brainstem strokes, she was taken for emergent neuro intervention.    REVIEW OF IMAGING:  Catheter angiogram on 2018 demonstrates reocclusion of the proximal basilar artery secondary to a critical (greater than 99%) stenosis involving the right vertebrobasilar junction (at the site of recent angioplasty).  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  Previously placed Wingspan/Xience stents within the right vertebral artery  are widely patent.  There was restoration of normal flow to the intracranial vertebrobasilar system with placement of a 2.25 x 8 mm Xience drug-eluting stent at the critical/recurrent right vertebrobasilar stenosis.    CT scan of the head on the morning of 6/14/2018 demonstrates ischemic changes within the brainstem and prashant, the majority of which appear chronic in nature.  No intracranial hemorrhages are identified.    LABS:  Lab Results   Component Value Date    WBC 8.30 06/14/2018    HGB 15.2 06/14/2018    HCT 47.6 (H) 06/14/2018    MCV 85.6 06/14/2018     06/14/2018     Lab Results   Component Value Date    GLUCOSE 130 (H) 06/14/2018    CALCIUM 9.1 06/14/2018     06/14/2018    K 3.7 06/14/2018    CO2 23.0 06/14/2018     06/14/2018    BUN 16 06/14/2018    CREATININE 0.83 06/14/2018    EGFRIFAFRI 81 06/14/2018    BCR 19.3 06/14/2018    ANIONGAP 9.0 06/14/2018     Lab Results   Component Value Date    HGBA1C 6.10 (H) 06/14/2018     Lab Results   Component Value Date    CHOL 152 06/14/2018    TRIG 45 06/14/2018    HDL 45 06/14/2018     06/14/2018       CURRENT MEDS:  Current Facility-Administered Medications   Medication Dose Route Frequency Provider Last Rate Last Dose   • amLODIPine (NORVASC) tablet 7.5 mg  7.5 mg Oral Q24H Gilberto Sears MD       • aspirin chewable tablet 81 mg  81 mg Oral Daily Gilberto Sears MD       • atorvastatin (LIPITOR) tablet 80 mg  80 mg Oral Nightly Gilberto Sears MD       • clopidogrel (PLAVIX) tablet 75 mg  75 mg Oral Daily Gilberto Sears MD       • HYDROcodone-acetaminophen (NORCO) 5-325 MG per tablet 1 tablet  1 tablet Oral Q4H PRN Gilberto Sears MD       • niCARdipine (CARDENE-IV) 20 mg/200 mL (0.1 mg/mL) in 0.9% NaCl infusion  5-15 mg/hr Intravenous Titrated Anona VINCE Molina APRN 75 mL/hr at 06/14/18 0856 7.5 mg/hr at 06/14/18 0856   • ondansetron (ZOFRAN) injection 4 mg  4 mg Intravenous Q6H PRN Gilberto Sears, MD   4 mg at 06/14/18 0955   •  phenylephrine (BALA-SYNEPHRINE) 50 mg in sodium chloride 0.9 % 250 mL (0.2 mg/mL) infusion  0.5-3 mcg/kg/min Intravenous Titrated Gilberto Sears MD       • sodium chloride 0.9 % flush 1-10 mL  1-10 mL Intravenous PRN Gilberto Sears MD       • sodium chloride 0.9 % infusion  75 mL/hr Intravenous Continuous Gilberto Sears MD 75 mL/hr at 06/14/18 0517 75 mL/hr at 06/14/18 0517       PHYSICAL EXAM:  Vitals:    06/14/18 1000   BP: 133/75   Pulse: 66   Resp: 14   Temp:    SpO2: 93%      She remains dysarthric, and has progressive weakness in the right upper extremity.  She is able to wiggle both feet, but is weak in the bilateral lower extremities.  She has yet to pass her dysphasia screening.  She also has some disconjugate gaze with diplopia.    ASSESSMENT/PLAN:  Ms. Harmon is a 72 y.o. female well known to the neuro interventional service, having been treated on multiple occasions for advanced intracranial atherosclerotic disease, predominantly involving the vertebrobasilar junction.  She has presented on multiple occasions over the past 7 years or so with brainstem/posterior fossa strokes and basilar occlusions.  She has always made a significant recovery following intervention previously.    She presented to the emergency department on 6/13/2018 with a 2 day history of progressive weakness and slurred speech.  Given her sub--acute presentation, she was not a candidate for IV TPA therapy, but MR angiography demonstrated reocclusion of her basilar artery.  Given the uniformly poor prognosis, she was taken for emergent neuro intervention with successful restoration of flow within the basilar artery with intra-arterial TPA/thrombectomy, as well as placement of a 2.25 mm Xience drug-eluting stent at a critical re-stenosis involving the right vertebrobasilar junction (recurrent stenosis, site of prior angioplasty).    Unfortunately, Ms. Harmon has clearly suffered new ischemic insults to the posterior fossa.  We will  obtain MRI/MRA of the head tonight, to assess the severity of her infarcts, assist in determining her prognosis, and establishing a new baseline MRA for future follow-up studies.    She will need to continue dual antiplatelet and statin therapy.  If she is unable to tolerate by mouth intake, she will need to have a NG tube placed (but will hopefully not require a PEG tube).

## 2018-06-14 NOTE — PROGRESS NOTES
Discharge Planning Assessment  [facility]     Patient Name: Jessy Harmon  MRN: [id number]  Today's Date: 6/14/2018    Admit Date: 6/13/2018          Discharge Needs Assessment     Row Name 06/14/18 0900       Living Environment    Lives With grandchild(j carlos)    Current Living Arrangements home/apartment/condo   Lives in 1 level home in [facility] her 2 grandsons.     Primary Care Provided by self    Provides Primary Care For no one, unable/limited ability to care for self    Quality of Family Relationships supportive    Able to Return to Prior Arrangements yes       Resource/Environmental Concerns    Resource/Environmental Concerns none    Transportation Concerns car, none       Transition Planning    Patient/Family Anticipates Transition to inpatient rehabilitation facility       Discharge Needs Assessment    Readmission Within the Last 30 Days previous discharge plan unsuccessful    Concerns to be Addressed adjustment to diagnosis/illness;basic needs;discharge planning    Equipment Currently Used at Home walker, rolling            Discharge Plan     Row Name 06/14/18 0901       Plan    Plan Rehab    Patient/Family in Agreement with Plan yes    Plan Comments Talked to Ms. Harmon and her daughter at .  She lives in 1 level home in [facility] her 2 grandsons.  She has been fairly independent and uses a RW at home.  No other DME.  Her PCP is Dr. Milvia Frye.  She uses [id number] in [place] for her Rx.  Her insurance covers most of cost of her medications.  We discussed possible need for rehab.  She and her daughter agreed to referral to [facility].  Will make referral to SARAHI Tong [facility].  DYLAN cortez for needs.     Final Discharge Disposition Code 62 - inpatient rehab facility        Destination     No service coordination in this encounter.      Durable Medical Equipment     No service coordination in this encounter.      Dialysis/Infusion     No service coordination in this encounter.      Home Medical  Care     No service coordination in this encounter.      Social Care     No service coordination in this encounter.                Demographic Summary     Row Name 06/14/18 0739       General Information    Admission Type inpatient    Arrived From PACU/recovery room    Referral Source admission list    Reason for Consult discharge planning    Preferred Language English    General Information Comments Her PCP is Dr. Milvia Frye.       Contact Information    Permission Granted to Share Info With             Functional Status     Row Name 06/14/18 0859       Functional Status    Usual Activity Tolerance good    Current Activity Tolerance fair       Functional Status, IADL    Medications independent    Meal Preparation independent    Housekeeping assistive person    Laundry assistive person    Shopping assistive person       Mental Status Summary    Recent Changes in Mental Status/Cognitive Functioning unable to assess       Employment/    Employment Status retired            Psychosocial    No documentation.           Abuse/Neglect    No documentation.           Legal    No documentation.           Substance Abuse    No documentation.           Patient Forms    No documentation.         Carter De Leon RN

## 2018-06-14 NOTE — THERAPY EVALUATION
Acute Care - Speech Language Pathology Initial Evaluation   [place]   Cognitive-Communication Evaluation     Patient Name: Jessy Harmon  : 1945  MRN: [id number]  Today's Date: 2018               Admit Date: 2018     Visit Dx:    ICD-10-CM ICD-9-CM   1. Cerebrovascular accident (CVA), unspecified mechanism I63.9 434.91   2. Vertebrobasilar artery stenosis I65.1 433.30    I65.09    3. Dysarthria R47.1 784.51   4. Weakness of left lower extremity R29.898 729.89   5. Dysphagia, unspecified type R13.10 787.20     Patient Active Problem List   Diagnosis   • CVA (cerebral vascular accident)   • Hyperlipidemia   • Hypertension   • Chronic kidney disease   • Stroke- History   • Atherosclerotic cerebrovascular disease   • Vertebrobasilar artery stenosis   • Cerebrovascular accident (CVA), basilar artery occlusion, s/p TPA/thrombectomy/stenting     Past Medical History:   Diagnosis Date   • Atherosclerotic cerebrovascular disease    • Chronic kidney disease    • Hyperlipidemia    • Hypertension    • Stroke     Multiple in 2011 and Dec 2011     Past Surgical History:   Procedure Laterality Date   • CEREBRAL ANGIOGRAM     • CEREBRAL ANGIOGRAM N/A 2018    Procedure: Cerebral angiogram;  Surgeon: Gilberto Sears MD;  Location:  [facility];  Service: Interventional Radiology   • CORONARY ARTERY BYPASS GRAFT     • INTERVENTIONAL RADIOLOGY PROCEDURE Bilateral 2018    Procedure: Carotid Cerebral Angiogram;  Surgeon: Gilberto Sears MD;  Location:  [facility];  Service: Interventional Radiology   • INTRACRANIAL ARTERY ANGIOPLASTY            SLP EVALUATION (last 72 hours)      SLP SLC Evaluation     Row Name 18 1100                   Communication Assessment/Intervention    Document Type evaluation  -RD        Subjective Information complains of;weakness  -RD        Patient Observations cooperative;agree to therapy;lethargic  -RD        Patient/Family  Observations Family member present  -RD        Patient Effort good  -RD           General Information    Patient Profile Reviewed yes  -RD        Pertinent History Of Current Problem Adm w/ CVA s/p thrombectomy/stenting. Hx of prior CVA 5/22/18. R wkns. Failed RN dys screen.   -RD        Precautions/Limitations, Vision vision impairment, bilaterally;vision impairment, left  -RD        Precautions/Limitations, Hearing WFL;for purposes of eval  -RD        Prior Level of Function-Communication WFL  -RD        Plans/Goals Discussed with patient;family;agreed upon  -RD        Barriers to Rehab none identified  -RD        Patient's Goals for Discharge return to all previous roles/activities  -RD        Family Goals for Discharge patient able to return to previous activities/roles  -RD           Pain Assessment    Additional Documentation Pain Scale: FACES Pre/Post-Treatment (Group)  -RD           Pain Scale: FACES Pre/Post-Treatment    Pain: FACES Scale, Pretreatment 2-->hurts little bit  -RD        Pain: FACES Scale, Post-Treatment 2-->hurts little bit  -RD           Oral Motor Structure and Function    Dentition Assessment edentulous, dentures not available  -RD        Mucosal Quality moist, healthy  -RD           Oral Musculature and Cranial Nerve Assessment    Oral Motor General Assessment oral labial or buccal impairment;lingual impairment  -RD        Oral Labial or Buccal Impairment, Detail, Cranial Nerve VII (Facial): right labial droop;reduced strength bilaterally;reduced ROM  -RD        Lingual Impairment, Detail. Cranial Nerves IX, XII (Glossopharyngeal and Hypoglossal) reduced strength right;reduced strength;bilaterally;reduced lingual ROM  -RD           Motor Speech Assessment/Intervention    Motor Speech Function moderate impairment  -RD        Characteristics Consistent with Dysarthria decreased intensity;hypernasality;decreased articulation  -RD           Cognitive Assessment Intervention- SLP    Cognitive  Function (Cognition) WFL  -RD        Orientation Status (Cognition) WFL  -RD        Memory (Cognitive) simple;WFL  -RD        Attention (Cognitive) WFL  -RD        Thought Organization (Cognitive) WFL;concrete convergent;abstract convergent;drawing conclusions  -RD        Reasoning (Cognitive) WFL;simple  -RD        Problem Solving (Cognitive) WFL;simple  -RD           SLP Clinical Impressions    SLP Diagnosis Moderate dysarthria c/b imprecise articulation, hypernasality, and decr'd intensity. Speech was 50-60% intellgible in conversation. Intelligibility improved w/ slower rate and when face-to-face. Cognitive skills were functional for basic tasks and some complex. Continue to assess higher level-cognition, reading, and writing. Expressive and receptive language appear to be WFL.   -RD        Rehab Potential/Prognosis good  -RD        Criteria for Skilled Therapy Interventions Met yes  -RD        Functional Impact difficulty communicating wants, needs;difficulty communicating in an emergency  -RD           Recommendations    Therapy Frequency (SLP SLC) 5 days per week  -RD        Predicted Duration Therapy Intervention (Days) until discharge  -RD        Anticipated Dischage Disposition anticipate therapy at next level of care;unknown  -RD          User Key  (r) = Recorded By, (t) = Taken By, (c) = Cosigned By    Initials Name Effective Dates    RD Sary Florian, MS CCC-SLP 04/03/18 -                EDUCATION  The patient has been educated in the following areas:     Cognitive Impairment Communication Impairment.    SLP Recommendation and Plan    SLP Diagnosis: Moderate dysarthria c/b imprecise articulation, hypernasality, and decr'd intensity. Speech was 50-60% intellgible in conversation. Intelligibility improved w/ slower rate and when face-to-face. Cognitive skills were functional for basic tasks and some complex. Continue to assess higher level-cognition, reading, and writing. Expressive and receptive  language appear to be WFL.     Rehab Potential/Prognosis: good    Criteria for Skilled Therapeutic Interventions Met: demonstrates skilled criteria  Criteria for Skilled Therapy Interventions Met: yes    Anticipated Dischage Disposition: anticipate therapy at next level of care, unknown         Therapy Frequency (Swallow): evaluation only, PRN    Predicted Duration Therapy Intervention (Days): until discharge          Plan of Care Review  Plan of Care Reviewed With: patient, family  Plan of Care Reviewed With: patient, family  Progress: improving              SLP GOALS     Row Name 06/14/18 1100             Reduce Perception of Hypernasality Goal 1 (SLP)    Reduce Perception of Hypernasality By Goal 1 (SLP) reducing rate;opening mouth wider for speech;80%;with minimal cues (75-90%)  -RD      Time Frame (Perception of Hypernasality Goal 1, SLP) short term goal (STG);by discharge  -RD         Articulation Goal 1 (SLP)    Improve Articulation Goal 1 (SLP) of specific sounds in words;of specific sounds in phrases;by over-articulating at word level;by over-articulating at phrase level;80%;with minimal cues (75-90%)  -RD      Time Frame (Articulation Goal 1, SLP) short term goal (STG);by discharge  -RD         Additional Goal 1 (SLP)    Additional Goal 1, SLP LTG: Pt will improve communication skills to actively participate in care w/ 90% accuracy w/o cues.   -RD      Time Frame (Additional Goal 1, SLP) by discharge  -RD        User Key  (r) = Recorded By, (t) = Taken By, (c) = Cosigned By    Initials Name Provider Type    ARACELI Florian MS CCC-SLP Speech and Language Pathologist                      Time Calculation:           Time Calculation- SLP     Row Name 06/14/18 1222             Time Calculation- SLP    SLP Start Time 1100  -RD      SLP Received On 06/14/18  -RD        User Key  (r) = Recorded By, (t) = Taken By, (c) = Cosigned By    Initials Name Provider Type    ARACELI Florian MS CCC-SLP Speech  and Language Pathologist          Therapy Charges for Today     Code Description Service Date Service Provider Modifiers Qty    48645422592 HC ST EVAL ORAL PHARYNG SWALLOW 3 2018 Sary Florian, MS CCC-SLP GN 1    90762531236 HC ST EVAL SPEECH AND PROD W LANG  3 2018 Sary Florian, MS CCC-SLP GN 1                     Sary Florian, MS CCC-SLP  2018 and Acute Care - Speech Language Pathology   Swallow Initial Evaluation  [facility]   Clinical Swallow Evaluation     Patient Name: Jessy Harmon  : 1945  MRN: [id number]  Today's Date: 2018               Admit Date: 2018    Visit Dx:     ICD-10-CM ICD-9-CM   1. Cerebrovascular accident (CVA), unspecified mechanism I63.9 434.91   2. Vertebrobasilar artery stenosis I65.1 433.30    I65.09    3. Dysarthria R47.1 784.51   4. Weakness of left lower extremity R29.898 729.89   5. Dysphagia, unspecified type R13.10 787.20     Patient Active Problem List   Diagnosis   • CVA (cerebral vascular accident)   • Hyperlipidemia   • Hypertension   • Chronic kidney disease   • Stroke- History   • Atherosclerotic cerebrovascular disease   • Vertebrobasilar artery stenosis   • Cerebrovascular accident (CVA), basilar artery occlusion, s/p TPA/thrombectomy/stenting     Past Medical History:   Diagnosis Date   • Atherosclerotic cerebrovascular disease    • Chronic kidney disease    • Hyperlipidemia    • Hypertension    • Stroke     Multiple in 2011 and Dec 2011     Past Surgical History:   Procedure Laterality Date   • CEREBRAL ANGIOGRAM     • CEREBRAL ANGIOGRAM N/A 2018    Procedure: Cerebral angiogram;  Surgeon: Gilberto Sears MD;  Location:  [facility];  Service: Interventional Radiology   • CORONARY ARTERY BYPASS GRAFT     • INTERVENTIONAL RADIOLOGY PROCEDURE Bilateral 2018    Procedure: Carotid Cerebral Angiogram;  Surgeon: Gilberto Sears MD;  Location:  [facility];  Service:  Interventional Radiology   • INTRACRANIAL ARTERY ANGIOPLASTY            SWALLOW EVALUATION (last 72 hours)      SLP Adult Swallow Evaluation     Row Name 06/14/18 1100                   General Information    Current Method of Nutrition NPO  -RD        Prior Level of Function-Communication WFL  -RD        Prior Level of Function-Swallowing no diet consistency restrictions  -RD        Patient's Goals for Discharge return to PO diet  -RD        Family Goals for Discharge patient able to return to PO diet  -RD           Oral Motor and Function    Secretion Management anterior loss  -RD        Volitional Swallow delayed  -RD        Volitional Cough weak  -RD           General Eating/Swallowing Observations    Eating/Swallowing Skills fed by SLP  -RD        Positioning During Eating upright 90 degree;upright in bed  -RD        Utensils Used spoon;cup;straw  -RD        Consistencies Trialed thin liquids;pudding thick  -RD           Clinical Swallow Eval    Oral Prep Phase impaired  -RD        Oral Transit impaired  -RD        Oral Residue impaired  -RD        Pharyngeal Phase suspected pharyngeal impairment  -RD        Esophageal Phase unremarkable  -RD        Clinical Swallow Evaluation Summary BS eval complete. Anterior loss of secretions on R. R labial droop and lingual weakness. Incr'd prep and transit w/ all trials, w/ inconsistent anterior loss of PO. Swallow was delayed per palpation. Weak cough. Inconsistent multiple swallows w/ pureed, but otherwise no s/s observed. Need to r/o pharyngeal dysphagia.   -RD           Oral Prep Concerns    Oral Prep Concerns anterior loss;increased prep time;incomplete or weak lip closure around spoon  -RD        Incomplete or Weak Lip Closure Around Spoon all consistencies  -RD        Anterior Loss all consistencies  -RD        Increased Prep Time all consistencies  -RD           Oral Transit Concerns    Oral Transit Concerns increased oral transit time  -RD        Increased Oral  Transit Time thin;pudding  -RD           Oral Residue Concerns    Oral Residue Concerns lateral sulcus residue, right  -RD        Lateral Sulcus Residue, Right pudding  -RD           Pharyngeal Phase Concerns    Pharyngeal Phase Concerns multiple swallows  -RD        Multiple Swallows pudding;other (see comments)   inconsistently  -RD           Clinical Impression    SLP Swallowing Diagnosis moderate;oral dysfunction;suspected pharyngeal dysfunction  -RD        Functional Impact risk of aspiration/pneumonia  -RD        Rehab Potential/Prognosis, Swallowing good, to achieve stated therapy goals  -RD        Criteria for Skilled Therapeutic Interventions Met demonstrates skilled criteria  -RD           Recommendations    Therapy Frequency (Swallow) evaluation only;PRN  -RD        SLP Diet Recommendation NPO  -RD        Recommended Diagnostics FEES  -RD        SLP Rec. for Method of Medication Administration meds via alternate route  -RD          User Key  (r) = Recorded By, (t) = Taken By, (c) = Cosigned By    Initials Name Effective Dates    ARACELI Florian, MS CCC-SLP 04/03/18 -         EDUCATION  The patient has been educated in the following areas:   Dysphagia (Swallowing Impairment) Oral Care/Hydration NPO rationale.    SLP Recommendation and Plan  SLP Swallowing Diagnosis: moderate, oral dysfunction, suspected pharyngeal dysfunction  SLP Diet Recommendation: NPO           Recommended Diagnostics: FEES  Criteria for Skilled Therapeutic Interventions Met: demonstrates skilled criteria  Anticipated Dischage Disposition: anticipate therapy at next level of care, unknown  Rehab Potential/Prognosis, Swallowing: good, to achieve stated therapy goals  Therapy Frequency (Swallow): evaluation only, PRN  Predicted Duration Therapy Intervention (Days): until discharge       Plan of Care Reviewed With: patient, family  Plan of Care Review  Plan of Care Reviewed With: patient, family  Progress: improving          SLP  GOALS     Row Name 06/14/18 1100             Reduce Perception of Hypernasality Goal 1 (SLP)    Reduce Perception of Hypernasality By Goal 1 (SLP) reducing rate;opening mouth wider for speech;80%;with minimal cues (75-90%)  -RD      Time Frame (Perception of Hypernasality Goal 1, SLP) short term goal (STG);by discharge  -RD         Articulation Goal 1 (SLP)    Improve Articulation Goal 1 (SLP) of specific sounds in words;of specific sounds in phrases;by over-articulating at word level;by over-articulating at phrase level;80%;with minimal cues (75-90%)  -RD      Time Frame (Articulation Goal 1, SLP) short term goal (STG);by discharge  -RD         Additional Goal 1 (SLP)    Additional Goal 1, SLP LTG: Pt will improve communication skills to actively participate in care w/ 90% accuracy w/o cues.   -RD      Time Frame (Additional Goal 1, SLP) by discharge  -RD        User Key  (r) = Recorded By, (t) = Taken By, (c) = Cosigned By    Initials Name Provider Type    ARACELI Florian MS CCC-SLP Speech and Language Pathologist               Time Calculation:         Time Calculation- SLP     Row Name 06/14/18 1222             Time Calculation- SLP    SLP Start Time 1100  -RD      SLP Received On 06/14/18  -RD        User Key  (r) = Recorded By, (t) = Taken By, (c) = Cosigned By    Initials Name Provider Type    ARACELI Florian MS CCC-SLP Speech and Language Pathologist          Therapy Charges for Today     Code Description Service Date Service Provider Modifiers Qty    91021325567 HC ST EVAL ORAL PHARYNG SWALLOW 3 6/14/2018 MS AFIA Andre GN 1    27509613331 HC ST EVAL SPEECH AND PROD W LANG  3 6/14/2018 MS AFIA Andre GN 1               MS AFIA Andre  6/14/2018

## 2018-06-14 NOTE — PLAN OF CARE
Problem: Patient Care Overview  Goal: Plan of Care Review  Outcome: Ongoing (interventions implemented as appropriate)   06/14/18 1100   Plan of Care Review   Progress improving   Coping/Psychosocial   Plan of Care Reviewed With patient;family   SLP evaluation completed. Will address moderate flaccid dysarthria during tx. Communication w/ pt improved when face-to-face and when pt speaks at a slower rate. Inconsistent s/s of aspiration. Drooling on R side and anterior loss w/ liquids. Plan for FEES to r/o pharyngeal dysphagia. Please see note for further details and recommendations.

## 2018-06-14 NOTE — PLAN OF CARE
Problem: Patient Care Overview  Goal: Plan of Care Review  Outcome: Ongoing (interventions implemented as appropriate)    Goal: Individualization and Mutuality  Outcome: Ongoing (interventions implemented as appropriate)    Goal: Interprofessional Rounds/Family Conf  Outcome: Ongoing (interventions implemented as appropriate)      Problem: Skin Injury Risk (Adult)  Goal: Identify Related Risk Factors and Signs and Symptoms  Outcome: Ongoing (interventions implemented as appropriate)    Goal: Skin Health and Integrity  Outcome: Ongoing (interventions implemented as appropriate)      Problem: Stroke (Ischemic) (Adult)  Goal: Signs and Symptoms of Listed Potential Problems Will be Absent, Minimized or Managed (Stroke)  Outcome: Ongoing (interventions implemented as appropriate)

## 2018-06-14 NOTE — PLAN OF CARE
Problem: Skin Injury Risk (Adult)  Goal: Skin Health and Integrity  Outcome: Outcome(s) achieved Date Met: 06/14/18  Patient turned every 2 hours to avoid skin issues.

## 2018-06-14 NOTE — PLAN OF CARE
Problem: Patient Care Overview  Goal: Plan of Care Review  Outcome: Ongoing (interventions implemented as appropriate)   06/14/18 9041   Coping/Psychosocial   Plan of Care Reviewed With patient;family   SLP FEES evaluation completed. Will address severe oropharyngeal dysphagia during treatment. Not managing secretions well and eventually aspirated secretions during exam. Observed to aspirate both thins and pudding despite compensations. R sided pharyngeal wkns > than L side. RECS: NPO, meds alt route, Oral care BID and PRN, aspiration precautions, initiate dys tx. Please see note for further details and recommendations.

## 2018-06-14 NOTE — PLAN OF CARE
Problem: Patient Care Overview  Goal: Plan of Care Review  Outcome: Ongoing (interventions implemented as appropriate)   06/14/18 1129   Coping/Psychosocial   Plan of Care Reviewed With patient;sibling   OTHER   Outcome Summary PT evaluation completed. Pt demonstrates RLE weakness with balance deficits and decreased indep re: functional mobility, warranting further skilled PT services to promote PLOF. Limited today by c/o abd pain and strong R lateral lean in sitting position. Recommend IP rehab at d/c.        Problem: Stroke (Ischemic) (Adult)  Goal: Signs and Symptoms of Listed Potential Problems Will be Absent, Minimized or Managed (Stroke)  Outcome: Ongoing (interventions implemented as appropriate)   06/14/18 1530   Goal/Outcome Evaluation   Problems Assessed (Stroke (Ischemic)) motor/sensory impairment;cognitive impairment;communication impairment   Problems Assessed (Stroke (Ischemic)) cognitive impairment;communication impairment;motor/sensory impairment

## 2018-06-15 ENCOUNTER — APPOINTMENT (OUTPATIENT)
Dept: MRI IMAGING | Facility: HOSPITAL | Age: 73
End: 2018-06-15

## 2018-06-15 LAB
ANION GAP SERPL CALCULATED.3IONS-SCNC: 8 MMOL/L (ref 3–11)
BASOPHILS # BLD AUTO: 0.01 10*3/MM3 (ref 0–0.2)
BASOPHILS NFR BLD AUTO: 0.1 % (ref 0–1)
BUN BLD-MCNC: 18 MG/DL (ref 9–23)
BUN/CREAT SERPL: 30 (ref 7–25)
CALCIUM SPEC-SCNC: 6.8 MG/DL (ref 8.7–10.4)
CHLORIDE SERPL-SCNC: 112 MMOL/L (ref 99–109)
CO2 SERPL-SCNC: 20 MMOL/L (ref 20–31)
CREAT BLD-MCNC: 0.6 MG/DL (ref 0.6–1.3)
DEPRECATED RDW RBC AUTO: 42.3 FL (ref 37–54)
EOSINOPHIL # BLD AUTO: 0 10*3/MM3 (ref 0–0.3)
EOSINOPHIL NFR BLD AUTO: 0 % (ref 0–3)
ERYTHROCYTE [DISTWIDTH] IN BLOOD BY AUTOMATED COUNT: 13.3 % (ref 11.3–14.5)
GFR SERPL CREATININE-BSD FRML MDRD: 118 ML/MIN/1.73
GLUCOSE BLD-MCNC: 108 MG/DL (ref 70–100)
GLUCOSE BLDC GLUCOMTR-MCNC: 109 MG/DL (ref 70–130)
GLUCOSE BLDC GLUCOMTR-MCNC: 109 MG/DL (ref 70–130)
GLUCOSE BLDC GLUCOMTR-MCNC: 90 MG/DL (ref 70–130)
HCT VFR BLD AUTO: 37 % (ref 34.5–44)
HGB BLD-MCNC: 11.4 G/DL (ref 11.5–15.5)
IMM GRANULOCYTES # BLD: 0.01 10*3/MM3 (ref 0–0.03)
IMM GRANULOCYTES NFR BLD: 0.1 % (ref 0–0.6)
LYMPHOCYTES # BLD AUTO: 1.3 10*3/MM3 (ref 0.6–4.8)
LYMPHOCYTES NFR BLD AUTO: 11.9 % (ref 24–44)
MAGNESIUM SERPL-MCNC: 2.1 MG/DL (ref 1.3–2.7)
MCH RBC QN AUTO: 26.8 PG (ref 27–31)
MCHC RBC AUTO-ENTMCNC: 30.8 G/DL (ref 32–36)
MCV RBC AUTO: 86.9 FL (ref 80–99)
MONOCYTES # BLD AUTO: 0.83 10*3/MM3 (ref 0–1)
MONOCYTES NFR BLD AUTO: 7.6 % (ref 0–12)
NEUTROPHILS # BLD AUTO: 8.78 10*3/MM3 (ref 1.5–8.3)
NEUTROPHILS NFR BLD AUTO: 80.4 % (ref 41–71)
PHOSPHATE SERPL-MCNC: 2.6 MG/DL (ref 2.4–5.1)
PLATELET # BLD AUTO: 233 10*3/MM3 (ref 150–450)
PMV BLD AUTO: 9.5 FL (ref 6–12)
POTASSIUM BLD-SCNC: 3.3 MMOL/L (ref 3.5–5.5)
POTASSIUM BLD-SCNC: 4.8 MMOL/L (ref 3.5–5.5)
RBC # BLD AUTO: 4.26 10*6/MM3 (ref 3.89–5.14)
SODIUM BLD-SCNC: 140 MMOL/L (ref 132–146)
WBC NRBC COR # BLD: 10.92 10*3/MM3 (ref 3.5–10.8)

## 2018-06-15 PROCEDURE — 85025 COMPLETE CBC W/AUTO DIFF WBC: CPT | Performed by: INTERNAL MEDICINE

## 2018-06-15 PROCEDURE — 84100 ASSAY OF PHOSPHORUS: CPT

## 2018-06-15 PROCEDURE — A9577 INJ MULTIHANCE: HCPCS | Performed by: INTERNAL MEDICINE

## 2018-06-15 PROCEDURE — 99233 SBSQ HOSP IP/OBS HIGH 50: CPT | Performed by: INTERNAL MEDICINE

## 2018-06-15 PROCEDURE — 70551 MRI BRAIN STEM W/O DYE: CPT

## 2018-06-15 PROCEDURE — 97165 OT EVAL LOW COMPLEX 30 MIN: CPT

## 2018-06-15 PROCEDURE — 3E0G76Z INTRODUCTION OF NUTRITIONAL SUBSTANCE INTO UPPER GI, VIA NATURAL OR ARTIFICIAL OPENING: ICD-10-PCS | Performed by: INTERNAL MEDICINE

## 2018-06-15 PROCEDURE — 0 GADOBENATE DIMEGLUMINE 529 MG/ML SOLUTION: Performed by: INTERNAL MEDICINE

## 2018-06-15 PROCEDURE — 83735 ASSAY OF MAGNESIUM: CPT

## 2018-06-15 PROCEDURE — 84132 ASSAY OF SERUM POTASSIUM: CPT | Performed by: INTERNAL MEDICINE

## 2018-06-15 PROCEDURE — 25010000002 HYDRALAZINE PER 20 MG: Performed by: NURSE PRACTITIONER

## 2018-06-15 PROCEDURE — 99233 SBSQ HOSP IP/OBS HIGH 50: CPT | Performed by: NURSE PRACTITIONER

## 2018-06-15 PROCEDURE — 82962 GLUCOSE BLOOD TEST: CPT

## 2018-06-15 PROCEDURE — 80048 BASIC METABOLIC PNL TOTAL CA: CPT | Performed by: INTERNAL MEDICINE

## 2018-06-15 PROCEDURE — 97110 THERAPEUTIC EXERCISES: CPT

## 2018-06-15 RX ORDER — HYDRALAZINE HYDROCHLORIDE 20 MG/ML
20 INJECTION INTRAMUSCULAR; INTRAVENOUS EVERY 6 HOURS PRN
Status: DISCONTINUED | OUTPATIENT
Start: 2018-06-15 | End: 2018-06-20 | Stop reason: HOSPADM

## 2018-06-15 RX ORDER — POTASSIUM CHLORIDE 1.5 G/1.77G
40 POWDER, FOR SOLUTION ORAL AS NEEDED
Status: DISCONTINUED | OUTPATIENT
Start: 2018-06-15 | End: 2018-06-20 | Stop reason: HOSPADM

## 2018-06-15 RX ORDER — AMINO ACIDS/PROTEIN HYDROLYS 15G-100/30
1 LIQUID (ML) ORAL DAILY
Status: DISCONTINUED | OUTPATIENT
Start: 2018-06-15 | End: 2018-06-20

## 2018-06-15 RX ADMIN — NICARDIPINE HYDROCHLORIDE 5 MG/HR: 0.1 INJECTION, SOLUTION INTRAVENOUS at 03:12

## 2018-06-15 RX ADMIN — ASPIRIN 81 MG CHEWABLE TABLET 81 MG: 81 TABLET CHEWABLE at 08:43

## 2018-06-15 RX ADMIN — ATORVASTATIN CALCIUM 80 MG: 40 TABLET, FILM COATED ORAL at 21:35

## 2018-06-15 RX ADMIN — GADOBENATE DIMEGLUMINE 13 ML: 529 INJECTION, SOLUTION INTRAVENOUS at 01:45

## 2018-06-15 RX ADMIN — POTASSIUM CHLORIDE 40 MEQ: 1.5 POWDER, FOR SOLUTION ORAL at 12:05

## 2018-06-15 RX ADMIN — Medication 20 MG: at 19:29

## 2018-06-15 RX ADMIN — CLOPIDOGREL BISULFATE 75 MG: 75 TABLET ORAL at 08:43

## 2018-06-15 RX ADMIN — NICARDIPINE HYDROCHLORIDE 5 MG/HR: 0.1 INJECTION, SOLUTION INTRAVENOUS at 12:05

## 2018-06-15 RX ADMIN — Medication 1 PACKET: at 12:05

## 2018-06-15 RX ADMIN — AMLODIPINE BESYLATE 7.5 MG: 2.5 TABLET ORAL at 08:43

## 2018-06-15 RX ADMIN — POTASSIUM CHLORIDE 40 MEQ: 1.5 POWDER, FOR SOLUTION ORAL at 06:04

## 2018-06-15 RX ADMIN — NICARDIPINE HYDROCHLORIDE 5 MG/HR: 0.1 INJECTION, SOLUTION INTRAVENOUS at 06:49

## 2018-06-15 NOTE — DISCHARGE PLACEMENT REQUEST
"Jessy Harmon (72 y.o. Female)     Yue De Leon, RN  Case Management  [facility]  [phone]        Date of Birth Social Security Number Address Home Phone MRN    1945  [id number]  [facility] [id number] [phone] [id number]    Yarsani Marital Status          Unknown Single       Admission Date Admission Type Admitting Provider Attending Provider Department, Room/Bed    6/13/18 Emergency Case, Светлана V., DO Case, Светлана V., DO [facility] 2B ICU, N238/1    Discharge Date Discharge Disposition Discharge Destination                       Attending Provider:  Светлана De La Torre DO    Allergies:  No Known Allergies    Isolation:  None   Infection:  None   Code Status:  FULL    Ht:  165.1 cm (65\")   Wt:  71.2 kg (157 lb)    Admission Cmt:  None   Principal Problem:  Cerebrovascular accident (CVA), basilar artery occlusion, s/p TPA/thrombectomy/stenting [I63.9]                 Active Insurance as of 6/13/2018     Primary Coverage     Payor Plan Insurance Group Employer/Plan Group    MEDICARE MEDICARE A & B      Payor Plan Address Payor Plan Phone Number Effective From Effective To    [address] [phone] 6/1/2008     [facility] [id number]       Subscriber Name Subscriber Birth Date Member ID       JESSY HARMON 1945 [id number]                 Emergency Contacts      (Rel.) Home Phone Work Phone Mobile Phone    Stanley Harmon (Son) [phone] -- --    Shannan Allred (Other) -- -- [phone]               History & Physical      Eliu Stern MD at 6/13/2018  4:26 PM            Intensive Care Admission Note     Cerebrovascular accident (CVA)    History of Present Illness     This very nice 72-year-old woman with a history of previous strokes including vertebral artery occlusion who was recently here in May for angioplasty who had been feeling somewhat poorly this past week including generalized weakness as well as intermittent " slurred speech. 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 sears. She successfully underwent intra-arterial tissue plasminogen activator, mechanical thrombectomy, and stenting with return of flow. NIH initially was 6. She is brought to the intensive care unit for postoperative management. The patient is able to articulate some words. She is answering all my questions appropriately noticed difficult to understand her completely and she is slow to answer.    Problem List, Surgical History, Family, Social History, and ROS     Patient Active Problem List   Diagnosis   • CVA (cerebral vascular accident)   • Hyperlipidemia   • Hypertension   • Chronic kidney disease   • Stroke- History   • Atherosclerotic cerebrovascular disease   • Vertebrobasilar artery stenosis   • Cerebrovascular accident (CVA), basilar artery occlusion, s/p TPA/thrombectomy/stenting     Past Surgical History:   Procedure Laterality Date   • CEREBRAL ANGIOGRAM     • CEREBRAL ANGIOGRAM N/A 5/22/2018    Procedure: Cerebral angiogram;  Surgeon: Gilberto Sears MD;  Location: [facility] INVASIVE LOCATION;  Service: Interventional Radiology   • CORONARY ARTERY BYPASS GRAFT     • INTRACRANIAL ARTERY ANGIOPLASTY         No Known Allergies  No current facility-administered medications on file prior to encounter.      Current Outpatient Prescriptions on File Prior to Encounter   Medication Sig   • amLODIPine (NORVASC) 2.5 MG tablet Take 3 tablets by mouth Daily.   • aspirin 81 MG EC tablet Take 81 mg by mouth Daily.   • atorvastatin (LIPITOR) 80 MG tablet Take 1 tablet by mouth Every Night.   • clopidogrel (PLAVIX) 75 MG tablet Take 1 tablet by mouth Daily.         History reviewed. No pertinent family history.  Social History   Substance Use Topics   • Smoking status: Never Smoker   • Smokeless tobacco:  "Never Used   • Alcohol use No     Review of Systems  I am unable to complete a full review of systems secondary to the patient's difficulty commuting. However she does not keep she does not have any shortness of breath, chest pain, visual changes, or worsened weakness at this time.    Physical Exam and Clinical Information   /77   Pulse 70   Temp 98.3 °F (36.8 °C) (Axillary)   Resp 16   Ht 165.1 cm (65\")   Wt 71.2 kg (157 lb)   SpO2 99%   BMI 26.13 kg/m²    Physical Exam   Constitutional: She appears well-developed and well-nourished. No distress.   HENT:   Head: Normocephalic and atraumatic.   Nose: Nose normal.   Mouth/Throat: No oropharyngeal exudate.   Her tongue deviates to the right   Eyes: Pupils are equal, round, and reactive to light. Right eye exhibits no discharge. No scleral icterus.   Neck: Normal range of motion. Neck supple. No JVD present. Carotid bruit is not present. No tracheal deviation present. No thyromegaly present.   Cardiovascular: Normal rate, regular rhythm and normal heart sounds.  Exam reveals no friction rub.    No murmur heard.  Pulmonary/Chest: Effort normal and breath sounds normal. No stridor. No respiratory distress. She has no wheezes.   Abdominal: Soft. Bowel sounds are normal. She exhibits no distension and no mass. There is no tenderness. There is no rebound.   Musculoskeletal: She exhibits no edema.   She has equal  strength bilaterally however it is 3-4 out of 5.    Lymphadenopathy:     She has no cervical adenopathy.   Neurological: She is alert.   Mild dysarthria.   Skin: Skin is warm. Capillary refill takes less than 2 seconds. No rash noted. She is not diaphoretic. No erythema.   Psychiatric: She has a normal mood and affect.   Nursing note and vitals reviewed.        Results from last 7 days  Lab Units 06/13/18  0929 06/13/18  0916   WBC 10*3/mm3 7.48  --    HEMOGLOBIN g/dL 16.2*  --    HEMOGLOBIN, POC g/dL  --  18.4*   PLATELETS 10*3/mm3 268  --  "       Results from last 7 days  Lab Units 06/13/18  0916   CREATININE mg/dL 0.90     Estimated Creatinine Clearance: 54.3 mL/min (by C-G formula based on SCr of 0.9 mg/dL).               I reviewed the patient's results and images.     Impression     Hospital Problem List     Cerebrovascular accident (CVA), basilar artery occlusion, s/p TPA/thrombectomy/stenting    Hyperlipidemia    Hypertension    Chronic kidney disease    Stroke- History            Plan/Recommendations     Admit to intensive care unit.  We will follow strict blood pressure control.  Follow-up labs will be ordered for tomorrow morning.  Strict neurological checks.  We will allow her to eat when she is a bit more awake.  She remains critically ill from cerebrovascular accident.    Eliu Stern MD, [facility]  Pulmonary and Critical Care Medicine  06/13/18 4:30 PM     Critical Care Time: 37 min (Not including time performing procedures)    CC: Milvia Frye MD    Electronically signed by Eliu Stern MD at 6/13/2018  4:34 PM       Hospital Medications (active)       Dose Frequency Start End    amLODIPine (NORVASC) tablet 7.5 mg 7.5 mg Every 24 Hours Scheduled 6/13/2018     Sig - Route: Take 7.5 mg by mouth Daily. - Oral    aspirin chewable tablet 81 mg 81 mg Daily 6/14/2018     Sig - Route: Chew 1 tablet Daily. - Oral    atorvastatin (LIPITOR) tablet 80 mg 80 mg Nightly 6/13/2018     Sig - Route: Take 2 tablets by mouth Every Night. - Oral    clopidogrel (PLAVIX) tablet 75 mg 75 mg Daily 6/14/2018     Sig - Route: Take 1 tablet by mouth Daily. - Oral    gadobenate dimeglumine (MULTIHANCE) injection 13 mL 13 mL Once in Imaging 6/15/2018 6/15/2018    Sig - Route: Infuse 13 mL into a venous catheter Once. - Intravenous    HYDROcodone-acetaminophen (NORCO) 5-325 MG per tablet 1 tablet 1 tablet Every 4 Hours PRN 6/13/2018 6/23/2018    Sig - Route: Take 1 tablet by mouth Every 4 (Four) Hours As Needed for Moderate Pain . - Oral     metoclopramide (REGLAN) injection 10 mg 10 mg Once 6/14/2018 6/14/2018    Sig - Route: Infuse 2 mL into a venous catheter 1 (One) Time. - Intravenous    niCARdipine (CARDENE-IV) 20 mg/200 mL (0.1 mg/mL) in 0.9% NaCl infusion 5-15 mg/hr Titrated 6/13/2018     Sig - Route: Infuse 5-15 mg/hr into a venous catheter Dose Adjusted By Provider As Needed. - Intravenous    ondansetron (ZOFRAN) injection 4 mg 4 mg Every 6 Hours PRN 6/13/2018     Sig - Route: Infuse 2 mL into a venous catheter Every 6 (Six) Hours As Needed for Nausea or Vomiting. - Intravenous    phenylephrine (BALA-SYNEPHRINE) 50 mg in sodium chloride 0.9 % 250 mL (0.2 mg/mL) infusion 0.5-3 mcg/kg/min × 71.2 kg Titrated 6/13/2018     Sig - Route: Infuse 35.6-213.6 mcg/min into a venous catheter Dose Adjusted By Provider As Needed. - Intravenous    potassium chloride (KLOR-CON) packet 40 mEq 40 mEq As Needed 6/15/2018     Sig - Route: Take 40 mEq by mouth As Needed (potassium replacement, see admin instructions). - Oral    PRO-STAT 1 packet 1 packet Daily 6/15/2018     Sig - Route: 1 packet by Nasogastric route Daily. - Nasogastric    sodium chloride 0.9 % flush 1-10 mL 1-10 mL As Needed 6/13/2018     Sig - Route: Infuse 1-10 mL into a venous catheter As Needed for Line Care. - Intravenous    sodium chloride 0.9 % infusion (Discontinued) 75 mL/hr Continuous 6/13/2018 6/15/2018    Sig - Route: Infuse 75 mL/hr into a venous catheter Continuous. - Intravenous             Physician Progress Notes (last 24 hours) (Notes from 6/14/2018  1:10 PM through 6/15/2018  1:10 PM)      Eliu Stern MD at 6/15/2018 12:49 PM          Intensive Care Follow-up     Hospital:  LOS: 2 days   Ms. Jessy Harmon, 72 y.o. female is followed for:   Cerebrovascular accident (CVA)     Subjective   Subjective   Interval History:  The chart has been reviewed. I am told that the patient failed her functional evaluation of esophageal swallowing. Hemodynamics have remained stable however  "she does remain on Cardene to control her blood pressure specifically. She is able to communicate with only mild dysarthria. She denies any new focal weakness.    The patient's relevant past medical, surgical and social history were reviewed and updated in Epic as appropriate.     Objective   Objective     Infusions:    niCARdipine 5-15 mg/hr Last Rate: 5 mg/hr (06/15/18 1205)   phenylephrine (BALA-SYNEPHRINE) 50 mg/250 (0.2 mg/mL) infusion 0.5-3 mcg/kg/min      Medications:    amLODIPine 7.5 mg Oral Q24H   aspirin 81 mg Oral Daily   atorvastatin 80 mg Oral Nightly   clopidogrel 75 mg Oral Daily   PRO-STAT 1 packet Nasogastric Daily       Vital Sign Min/Max for last 24 hours  Temp  Min: 97.9 °F (36.6 °C)  Max: 98.6 °F (37 °C)   BP  Min: 122/69  Max: 144/83   Pulse  Min: 65  Max: 80   Resp  Min: 14  Max: 18   SpO2  Min: 90 %  Max: 98 %   No Data Recorded       Input/Output for last 24 hour shift  06/14 0701 - 06/15 0700  In: 3118.2 [I.V.:3098.2]  Out: 1665 [Urine:1665]      Objective:  General Appearance:  Comfortable, ill-appearing and in no acute distress.    Vital signs: (most recent): Blood pressure 131/75, pulse 71, temperature 98.2 °F (36.8 °C), temperature source Axillary, resp. rate 16, height 165.1 cm (65\"), weight 71.2 kg (157 lb), SpO2 96 %.  No fever.    Output: Producing urine.    HEENT: Normal HEENT exam.    Lungs:  Normal effort and normal respiratory rate.  Breath sounds clear to auscultation.  She is not in respiratory distress.  No stridor.  No rales, decreased breath sounds, wheezes or rhonchi.    Heart: Normal rate.  Regular rhythm.  S1 normal and S2 normal.  No murmur or friction rub.   Chest: Symmetric chest wall expansion.   Abdomen: Abdomen is soft and non-distended.  Bowel sounds are normal.   There is no abdominal tenderness.   There is no mass.   Extremities: Decreased range of motion.  There is no deformity or dependent edema.    Neurological: Patient is oriented to person, place and time.  " (Patient is alert. Mild dysarthria. Generalized weakness throughout all 4 extremities with no focality.).    Pupils:  Pupils are equal, round, and reactive to light.  Pupils are equal.   Skin:  Warm.  No rash, ecchymosis or cyanosis.               Results from last 7 days  Lab Units 06/15/18  0414 06/14/18  0512 06/13/18  0929   WBC 10*3/mm3 10.92* 8.30 7.48   HEMOGLOBIN g/dL 11.4* 15.2 16.2*   PLATELETS 10*3/mm3 233 288 268       Results from last 7 days  Lab Units 06/15/18  0414 06/14/18  0512 06/13/18  0916   SODIUM mmol/L 140 139  --    POTASSIUM mmol/L 3.3* 3.7  --    CO2 mmol/L 20.0 23.0  --    BUN mg/dL 18 16  --    CREATININE mg/dL 0.60 0.83 0.90   GLUCOSE mg/dL 108* 130*  --      Estimated Creatinine Clearance: 61.1 mL/min (by C-G formula based on SCr of 0.6 mg/dL).          I reviewed the patient's results and images.     Assessment/Plan   Impression      Principal Problem:    Cerebrovascular accident (CVA), basilar artery occlusion, s/p TPA/thrombectomy/stenting  Active Problems:    Hyperlipidemia    Hypertension    Chronic kidney disease    Stroke- History       Plan        We will start tube feedings at this point.  Restart home Norvasc and wean Cardene as tolerated.  I will make further modifications to her antihypertensive regimen as needed.  She will remain in the intensive care unit today.  Replace potassium.  Continue with speech therapy and physical rehabilitation.  Follow-up orders and labs have been placed.    Plan of care and goals reviewed with mulitdisciplinary team at daily rounds.   I discussed the patient's findings and my recommendations with patient and nursing staff       Eliu Stern MD, [facility]  Pulmonary and Critical Care Medicine  06/15/18 12:49 PM       Electronically signed by Eliu Stern MD at 6/15/2018 12:52 PM

## 2018-06-15 NOTE — PROGRESS NOTES
"Intensive Care Follow-up     Hospital:  LOS: 2 days   Ms. Jessy Harmon, 72 y.o. female is followed for:   Cerebrovascular accident (CVA)        Subjective   Interval History:  The chart has been reviewed. I am told that the patient failed her functional evaluation of esophageal swallowing. Hemodynamics have remained stable however she does remain on Cardene to control her blood pressure specifically. She is able to communicate with only mild dysarthria. She denies any new focal weakness.    The patient's relevant past medical, surgical and social history were reviewed and updated in Epic as appropriate.        Objective     Infusions:    niCARdipine 5-15 mg/hr Last Rate: 5 mg/hr (06/15/18 1205)   phenylephrine (BALA-SYNEPHRINE) 50 mg/250 (0.2 mg/mL) infusion 0.5-3 mcg/kg/min      Medications:    amLODIPine 7.5 mg Oral Q24H   aspirin 81 mg Oral Daily   atorvastatin 80 mg Oral Nightly   clopidogrel 75 mg Oral Daily   PRO-STAT 1 packet Nasogastric Daily       Vital Sign Min/Max for last 24 hours  Temp  Min: 97.9 °F (36.6 °C)  Max: 98.6 °F (37 °C)   BP  Min: 122/69  Max: 144/83   Pulse  Min: 65  Max: 80   Resp  Min: 14  Max: 18   SpO2  Min: 90 %  Max: 98 %   No Data Recorded       Input/Output for last 24 hour shift  06/14 0701 - 06/15 0700  In: 3118.2 [I.V.:3098.2]  Out: 1665 [Urine:1665]      Objective:  General Appearance:  Comfortable, ill-appearing and in no acute distress.    Vital signs: (most recent): Blood pressure 131/75, pulse 71, temperature 98.2 °F (36.8 °C), temperature source Axillary, resp. rate 16, height 165.1 cm (65\"), weight 71.2 kg (157 lb), SpO2 96 %.  No fever.    Output: Producing urine.    HEENT: Normal HEENT exam.    Lungs:  Normal effort and normal respiratory rate.  Breath sounds clear to auscultation.  She is not in respiratory distress.  No stridor.  No rales, decreased breath sounds, wheezes or rhonchi.    Heart: Normal rate.  Regular rhythm.  S1 normal and S2 normal.  No murmur or " friction rub.   Chest: Symmetric chest wall expansion.   Abdomen: Abdomen is soft and non-distended.  Bowel sounds are normal.   There is no abdominal tenderness.   There is no mass.   Extremities: Decreased range of motion.  There is no deformity or dependent edema.    Neurological: Patient is oriented to person, place and time.  (Patient is alert. Mild dysarthria. Generalized weakness throughout all 4 extremities with no focality.).    Pupils:  Pupils are equal, round, and reactive to light.  Pupils are equal.   Skin:  Warm.  No rash, ecchymosis or cyanosis.               Results from last 7 days  Lab Units 06/15/18  0414 06/14/18  0512 06/13/18  0929   WBC 10*3/mm3 10.92* 8.30 7.48   HEMOGLOBIN g/dL 11.4* 15.2 16.2*   PLATELETS 10*3/mm3 233 288 268       Results from last 7 days  Lab Units 06/15/18  0414 06/14/18  0512 06/13/18  0916   SODIUM mmol/L 140 139  --    POTASSIUM mmol/L 3.3* 3.7  --    CO2 mmol/L 20.0 23.0  --    BUN mg/dL 18 16  --    CREATININE mg/dL 0.60 0.83 0.90   GLUCOSE mg/dL 108* 130*  --      Estimated Creatinine Clearance: 61.1 mL/min (by C-G formula based on SCr of 0.6 mg/dL).          I reviewed the patient's results and images.     Assessment/Plan   Impression      Principal Problem:    Cerebrovascular accident (CVA), basilar artery occlusion, s/p TPA/thrombectomy/stenting  Active Problems:    Hyperlipidemia    Hypertension    Chronic kidney disease    Stroke- History       Plan        We will start tube feedings at this point.  Restart home Norvasc and wean Cardene as tolerated.  I will make further modifications to her antihypertensive regimen as needed.  She will remain in the intensive care unit today.  Replace potassium.  Continue with speech therapy and physical rehabilitation.  Follow-up orders and labs have been placed.    Plan of care and goals reviewed with mulitdisciplinary team at daily rounds.   I discussed the patient's findings and my recommendations with patient and nursing  staff       Eliu Stern MD, [facility]  Pulmonary and Critical Care Medicine  06/15/18 12:49 PM

## 2018-06-15 NOTE — PLAN OF CARE
Problem: Patient Care Overview  Goal: Plan of Care Review  Outcome: Ongoing (interventions implemented as appropriate)   06/15/18 1592   Plan of Care Review   Progress improving   Coping/Psychosocial   Plan of Care Reviewed With patient   OTHER   Outcome Summary OT completed a brief chart review relating to presenting physical/cognitive deficits. Pt Max Ax2 for bed mobility and static sitting balance, Dep for ADLs this date. Recommend cont skilled IPOT intervention. Recommend pt DC to IP rehab.

## 2018-06-15 NOTE — PROGRESS NOTES
NAME: LIDA MAZARIEGOS  : 1945  PCP: Milvia Frye MD  ADMITTING PHYSICIAN: Светлана De La Torre DO    DATE OF ADMISSION:  2018  DATE OF SERVICE: 6/15/2018    HOSPITAL DAY:  2 days    HISTORY OF PRESENT ILLNESS:  72 y.o. female  well known to the Neurointerventional service, having been treated on multiple occasions for advanced intracranial atherosclerotic disease (ICAD) and prior basilar occlusion X 2.  She's had angioplasty/stent placement involving her intracranial right vertebral artery greater than 5 years ago ([employer]).  She did very well for several years, but presented to [facility] in mid-May 2018 for recurrent ICAD and basilar thrombosis.  She was transferred to [facility], and underwent emergent neurointervention with IA tPA/thrombectomy and angioplasty of critical stenosis of right VB junction on 18.  This represented progression of disease, as her prior stents were widely patent.  She was restarted on Plavix/ASA regimen, and made an excellent recovery.       She re-presented to [facility] emergency department on 2018 with a 2 day history of worsening speech difficulties and worsening left lower extremity weakness. Given her sub--acute presentation, with symptom onset occurring 2 days prior, she was deemed not a candidate for IV TPA therapy.  She did undergo MR angiography of the head, which demonstrated reocclusion of her basilar artery.  Given concern for further progression of symptoms and worsening brainstem strokes, she was taken for emergent neuro intervention.    REVIEW OF IMAGING:  Ct Head Without Contrast    Result Date: 2018  EXAMINATION: CT HEAD WO CONTRAST-  INDICATION: Status post thrombectomy; I63.9-Cerebral infarction, unspecified; I65.1-Occlusion and stenosis of basilar artery; I65.09-Occlusion and stenosis of unspecified vertebral artery; R47.1-Dysarthria and anarthria; R29.898-Other symptoms and signs  involving the musculoskeletal system.  TECHNIQUE: Axial CT of the head without intravenous contrast administration.  The radiation dose reduction device was turned on for each scan per the ALARA (As Low as Reasonably Achievable) protocol.  COMPARISON: MR angiogram dated 06/13/2018.  FINDINGS: Midline structures are symmetric without evidence of mass, mass effect or midline shift. Ventricles and sulci are within normal limits. No hydrocephalus or intraaxial hemorrhage. No extraaxial fluid collections. Moderate chronic small vessel ischemic changes evidenced by attenuation differences in the periventricular and deep white matter. Posterior fossa unremarkable with fourth ventricle patent. Globes and orbits are partially visualized and unremarkable. Visualized paranasal sinuses and mastoid air cells are grossly clear and well pneumatized. Calvarium intact.      No acute intracranial abnormality specifically no evidence for intraaxial hemorrhage or significant edema. No hydrocephalus development.  D:  06/14/2018 E:  06/14/2018  This report was finalized on 6/14/2018 9:16 AM by Dr. Jared Martinez.      Ct Head Without Contrast    Result Date: 5/24/2018  EXAMINATION: CT HEAD WO CONTRAST-05/23/2018:  INDICATION: Stroke; Z74.09-Other reduced mobility.  TECHNIQUE: Axial CT of the head without intravenous contrast administration.  The radiation dose reduction device was turned on for each scan per the ALARA (As Low as Reasonably Achievable) protocol.  COMPARISON: MRI brain dated 05/22/2018.  FINDINGS: Midline structures are symmetric without evidence of mass, mass effect or midline shift. Ventricles and sulci within normal limits. Areas of encephalomalacia and chronic small vessel ischemic changes again noted within the periventricular white matter. No intra-axial hemorrhage or extra-axial fluid collection. Globes and orbits are unremarkable. The visualized paranasal sinuses and mastoid air cells are grossly clear and well  "pneumatized. Calvarium intact.      No acute intracranial abnormality specifically, no evidence for intra-axial hemorrhage.  D:  05/23/2018 E:  05/23/2018    This report was finalized on 5/24/2018 10:12 AM by Dr. Jared Martinez.      Mri Angiogram Head Without Contrast    Result Date: 5/24/2018  EXAMINATION: MRI ANGIOGRAM HEAD WO CONTRAST-  INDICATION: Basilar stenosis; Z74.09-Other reduced mobility.  TECHNIQUE: Intracranial MRA was performed with images acquired in the axial plane and displayed in the axial as well as the rotational and \"tumble\" projections.  COMPARISON: 05/22/2018.  FINDINGS: The anterior and middle cerebral artery circulations demonstrate minimal stenoses with no high-grade stenosis or occlusion and the appearance is unchanged when compared with 05/22/2018. 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 There has been recanalization of the right vertebral artery but with multifocal areas of high-grade stenosis and the basilar artery is patent but diffusely narrowed.      There has been recanalization of the right vertebral artery and of the basilar artery since priests examination of 05/22/2018. There are, however, multifocal high-grade stenosis in the right vertebral artery and the basilar artery is markedly attenuated.  D:  05/24/2018 E:  05/24/2018  This report was finalized on 5/24/2018 12:55 PM by Dr. Rg Cary MD.      Mri Angiogram Head Without Contrast    Result Date: 5/22/2018  EXAMINATION: MRI ANGIOGRAM HEAD WO CONTRAST-  INDICATION: Stroke     TECHNIQUE: 3-D time-of-flight unenhanced images of the intracranial portions of the internal carotid arteries, basilar artery, [place] and major intracranial arteries with 3-D angiographic reconstructions.  COMPARISON: Brain MRA 6/14/2012  FINDINGS: Previous MRA exam from 2012 showed no apparent flow signal in left vertebral artery, but did show right vertebral artery signal, and relatively attenuated " "attenuated basilar artery flow signal, presumably as a result of patient's known stent.  On today's exam, however, there is no definite flow signal in the area of the basilar artery, and only weak discontinuous signal in the area of the right vertebral artery. As before, left vertebral artery is not visible.  Posterior cerebral arteries appear primarily supplied from the anterior circulation and appear stable from the prior study, with areas of attenuation proximally on the left along the midportion of the right posterior cerebral artery but no new stenosis.  The right and left ICA are normal, robust in appearance. Anterior and middle cerebral arteries are patent with mild diffuse irregularity of outline as on the previous study but no high-grade or new stenosis.       1. No significant stenosis of the anterior circulation. 2. Minimal flow signal in the expected area of the right vertebral artery compared to prior study, and flow signal no longer visible in the basilar artery. As on the previous study, no left vertebral flow signal. 3. Posterior cerebral arteries appear primarily supplied from the anterior circulation and remain normally patent.    This report was finalized on 5/22/2018 3:25 PM by DR. Neeraj Haddad MD.      Mri Angiogram Head With & Without Contrast    Result Date: 6/15/2018  EXAMINATION: MRI ANGIOGRAM HEAD W WO CONTRAST- 06/15/2018  INDICATION: I63.9-Cerebral infarction, unspecified; I65.1-Occlusion and stenosis of basilar artery; I65.09-Occlusion and stenosis of unspecified vertebral artery; R47.1-Dysarthria and anarthria; R29.898-Other symptoms and signs involving the musculoskeletal system; Z74.09-Other reduced mobility; R13.12-Dysphagia, oropharyngeal phase     TECHNIQUE: Intracranial MRA is displayed in the axial, rotational and \"tumble\" projections (3-D).  Images were obtained prior to and following intravenous contrast.  COMPARISON: 06/13/2018  FINDINGS: The right basilar artery is only briefly " patent and is essentially totally occluded. The left vertebral artery is markedly attenuated and demonstrates areas of occlusion and reconstitution resulting in a tiny basilar artery. Basilar artery appears to be patent but diffusely markedly attenuated. Both posterior communicating arteries are patent with a normal] posterior circulation. There is no intracranial aneurysm. There are no critical stenoses of the anterior or middle circulation.      Both vertebral arteries show intermittent occlusion and areas of reconstitution. The right vertebral artery is essentially totally occluded. The basilar artery is markedly attenuated and also shows intermittent areas of occlusion. The more distal basilar artery is patent but very small and the posterior cerebral circulation is largely related to patent bilateral posterior communicating arteries. When compared to the examination of 06/13/2018 the flow in the left vertebral artery and basilar artery is slightly improved.   D:  06/15/2018 E:  06/15/2018  This report was finalized on 6/15/2018 11:24 AM by Dr. Rg Cary MD.      Mri Angiogram Head With & Without Contrast    Result Date: 6/13/2018  EXAMINATION: MRI ANGIOGRAM HEAD W WO CONTRAST-  INDICATION: Stroke.  TECHNIQUE: Intracranial MRA is displayed without contrast. Images are displayed in the axial, coronal and rotational projections (3-D).  COMPARISON: 05/24/2018.  FINDINGS: Compared to the previous examination there has been further diminution of flow to the posterior fossa. The basilar artery was markedly attenuated, but patent on the previous examination. The basilar artery is now occluded. There are some small segments of the right and left vertebral arteries which are patent but the arteries overall are essentially occluded. The anterior circulation demonstrates the absence of an A1 segment of the right anterior cerebral artery. Otherwise, there is mild stenosis in the middle cerebral arteries and in the  proximal pericallosal arteries.      When compared to previous examination of 05/24/2018, there has been reduction of vascular flow to the posterior fossa. Specifically on the previous exam of 05/24/2018 there is a patent albeit attenuated basilar artery which is now occluded. Furthermore, on the previous exam there were segments of each vertebral artery which are patent. There is much more occlusive disease involving both vertebral arteries on the current exam.   D:  06/13/2018 E:  06/13/2018  This report was finalized on 6/13/2018 2:15 PM by Dr. Rg Cary MD.      Mri Brain Without Contrast    Result Date: 6/15/2018  EXAMINATION: MRI BRAIN WO CONTRAST- 06/14/2018  INDICATION: CVA, basilar thrombosis; I63.9-Cerebral infarction, unspecified; I65.1-Occlusion and stenosis of basilar artery; I65.09-Occlusion and stenosis of unspecified vertebral artery; R47.1-Dysarthria and anarthria; R29.898-Other symptoms and signs involving the musculoskeletal system; Z74.09-Other reduced mobility; R13.12-Dysphagia, oropharyngeal phase     TECHNIQUE: Sagittal and axial images of the brain are displayed. No intravenous contrast was utilized.   COMPARISON: NONE  FINDINGS: There are bilateral old cerebellar infarcts.  There are periventricular white matter changes typical of aging. There is no intracranial mass, hemorrhage, midline shift or extra-axial fluid collection. There is no acute infarction.      There are no acute findings. There are small old bilateral cerebellar infarcts. Otherwise there are age-related changes without focal or acute finding.  D:  06/15/2018 E:  06/15/2018    This report was finalized on 6/15/2018 11:24 AM by Dr. Rg Cary MD.      Mri Brain Without Contrast    Result Date: 5/22/2018  EXAMINATION: MRI BRAIN WO CONTRAST-  INDICATION: Stroke     TECHNIQUE: Sagittal and axial T1 and axial T2 FLAIR diffusion weighted images the brain without contrast  COMPARISON: Brain MRI 12/27/2011.  FINDINGS: History  indicates patient was found by son with increased lethargy, slurred speech and right facial droop. Patient has prior history of basilar artery stent in 2011.  A couple of old posterior inferior infarcts are seen in the right and left cerebellum, and diffusion-weighted images show perhaps 15 or 20 punctate areas of diffusion restriction in both right and left cerebellum consistent with multiple acute infarcts, including a couple of infarcts of the cerebellar vermis. There are also subtle areas of weak diffusion restriction in the prashant, at least one of which is a fairly well-defined 4 mm infarct in the posterior right prashant just above the pontomedullary junction. No definite restricted diffusion is identified elsewhere.  Remaining sequences show subtle patchy edema generally corresponding to the areas of the acute cerebellar infarcts, and extensive, chronic appearing central white matter changes. There is no signal abnormality suggestive of hemorrhage no evidence of hydrocephalus mass or mass effect, or abnormal extra-axial collection. A small old anterior right pontine infarct is incidentally noted.       1. Old right and left posterior inferior cerebellar infarcts. 2. Numerous new punctate acute infarcts of both cerebellar hemispheres, and more subtle, but apparently acute infarcts of the prashant. 3. No acute intracranial abnormality is seen elsewhere.    This report was finalized on 5/22/2018 3:17 PM by DR. Neeraj Haddad MD.      Ct Head Without Contrast Stroke Protocol    Result Date: 6/13/2018  EXAMINATION: CT HEAD WO CONTRAST STROKE PROTOCOL- 06/13/2018  INDICATION: Stroke     TECHNIQUE: CT scan of the head was performed at 5 mm intervals. No intravenous contrast was utilized.  The radiation dose reduction device was turned on for each scan per the ALARA (As Low as Reasonably Achievable) protocol.  COMPARISON: 05/23/2018  FINDINGS: There are low densities in each cerebellar hemisphere consistent with old infarcts.  There are age-related periventricular white matter changes, slightly more prominent on the left than on the right. There is no intracranial mass, hemorrhage, midline shift or extra-axial fluid collection.      Stable chronic findings. There has been no change when compared with 05/23/2018.  D:  06/13/2018 E:  06/13/2018  This report was finalized on 6/13/2018 2:15 PM by Dr. Rg Cary MD.          LABS:  Lab Results   Component Value Date    WBC 10.92 (H) 06/15/2018    HGB 11.4 (L) 06/15/2018    HCT 37.0 06/15/2018    MCV 86.9 06/15/2018     06/15/2018     Lab Results   Component Value Date    GLUCOSE 108 (H) 06/15/2018    CALCIUM 6.8 (L) 06/15/2018     06/15/2018    K 3.3 (L) 06/15/2018    CO2 20.0 06/15/2018     (H) 06/15/2018    BUN 18 06/15/2018    CREATININE 0.60 06/15/2018    EGFRIFAFRI 118 06/15/2018    BCR 30.0 (H) 06/15/2018    ANIONGAP 8.0 06/15/2018     Lab Results   Component Value Date    HGBA1C 6.10 (H) 06/14/2018     Lab Results   Component Value Date    CHOL 152 06/14/2018    TRIG 45 06/14/2018    HDL 45 06/14/2018     06/14/2018       CURRENT MEDS:  Current Facility-Administered Medications   Medication Dose Route Frequency Provider Last Rate Last Dose   • amLODIPine (NORVASC) tablet 7.5 mg  7.5 mg Oral Q24H Gilberto Sears MD   7.5 mg at 06/15/18 0843   • aspirin chewable tablet 81 mg  81 mg Oral Daily Gilberto Sears MD   81 mg at 06/15/18 0843   • atorvastatin (LIPITOR) tablet 80 mg  80 mg Oral Nightly Gilberto Sears MD   80 mg at 06/14/18 2024   • clopidogrel (PLAVIX) tablet 75 mg  75 mg Oral Daily Gilberto Sears MD   75 mg at 06/15/18 0843   • HYDROcodone-acetaminophen (NORCO) 5-325 MG per tablet 1 tablet  1 tablet Oral Q4H PRN Gilberto Sears MD   1 tablet at 06/14/18 2025   • niCARdipine (CARDENE-IV) 20 mg/200 mL (0.1 mg/mL) in 0.9% NaCl infusion  5-15 mg/hr Intravenous Titrated KENIA Frias 50 mL/hr at 06/15/18 1205 5 mg/hr at 06/15/18 1205   •  ondansetron (ZOFRAN) injection 4 mg  4 mg Intravenous Q6H PRN Gilberto Sears MD   4 mg at 06/14/18 0924   • phenylephrine (BALA-SYNEPHRINE) 50 mg in sodium chloride 0.9 % 250 mL (0.2 mg/mL) infusion  0.5-3 mcg/kg/min Intravenous Titrated Gilberto Sears MD       • potassium chloride (KLOR-CON) packet 40 mEq  40 mEq Oral PRN KENIA Britt   40 mEq at 06/15/18 1205   • PRO-STAT 1 packet  1 packet Nasogastric Daily Danette Cuevas, RD   1 packet at 06/15/18 1205   • sodium chloride 0.9 % flush 1-10 mL  1-10 mL Intravenous PRN Gilberto Sears MD           PHYSICAL EXAM:  Vitals:    06/15/18 1200   BP: 131/75   Pulse: 71   Resp: 16   Temp: 98.2 °F (36.8 °C)   SpO2: 96%    Physical Exam   Constitutional: She appears well-developed and well-nourished.   HENT:   Head: Normocephalic and atraumatic.   Eyes: Conjunctivae are normal. Pupils are equal, round, and reactive to light.   Neck: Trachea normal. Carotid bruit is not present. No thyroid mass present.   Cardiovascular: Regular rhythm.    No murmur heard.  Pulmonary/Chest: Effort normal.   Neurological: She is alert.         Interval:  (baseline)  1a. Level of Consciousness: 0-->Alert, keenly responsive  1b. LOC Questions: 0-->Answers both questions correctly  1c. LOC Commands: 0-->Performs both tasks correctly  2. Best Gaze: 0-->Normal  3. Visual: 0-->No visual loss  4. Facial Palsy: 1-->Minor paralysis (flattened nasolabial fold, asymmetry on smiling)  5a. Motor Arm, Left: 0-->No drift, limb holds 90 (or 45) degrees for full 10 secs  5b. Motor Arm, Right: 2-->Some effort against gravity, limb cannot get to or maintain (if cued) 90 (or 45) degrees, drifts down to bed, but has some effort against gravity  6a. Motor Leg, Left: 2-->Some effort against gravity, leg falls to bed by 5 secs, but has some effort against gravity  6b. Motor Leg, Right: 2-->Some effort against gravity, leg falls to bed by 5 secs, but has some effort against gravity  7. Limb Ataxia:  0-->Absent  8. Sensory: 0-->Normal, no sensory loss  9. Best Language: 0-->No aphasia, normal  10. Dysarthria: 1-->Mild-to-moderate dysarthria, patient slurs at least some words and, at worst, can be understood with some difficulty  11. Extinction and Inattention (formerly Neglect): 0-->No abnormality    Total (NIH Stroke Scale): 8      ASSESSMENT/PLAN:  Ms. Harmon is a 72 y.o. female well known to the neuro interventional service, having been treated on multiple occasions for advanced intracranial atherosclerotic disease, predominantly involving the vertebrobasilar junction.  She has presented on multiple occasions over the past 7 years or so with brainstem/posterior fossa strokes and basilar occlusions.  She has always made a significant recovery following intervention previously.     She presented to the emergency department on 6/13/2018 with a 2 day history of progressive weakness and slurred speech.  Given her sub--acute presentation, she was not a candidate for IV TPA therapy, but MR angiography demonstrated reocclusion of her basilar artery.  Given the uniformly poor prognosis, she was taken for emergent neuro intervention with successful restoration of flow within the basilar artery with intra-arterial TPA/thrombectomy, as well as placement of a 2.25 mm Xience drug-eluting stent at a critical re-stenosis involving the right vertebrobasilar junction (recurrent stenosis, site of prior angioplasty).     Unfortunately, Ms. Harmon has clearly suffered new ischemic insults to the posterior fossa.  We have obtained MRI/MRA of the head, which has been reviewed and shows small new infarcts, which she should have a good recovery from.      She will need to continue dual antiplatelet and statin therapy.  As she has failed her FEES she has had an feeding tube placed.  She will need continued therapy and is willing to be assessed for rehab placement.  I have discussed this with her family and the patient and she states  she is willing to do whatever is necessary.

## 2018-06-15 NOTE — CONSULTS
Adult Nutrition  Assessment/PES    Patient Name:  Jessy Harmon  YOB: 1945  MRN: [id number]  Admit Date:  6/13/2018    Assessment Date:  6/15/2018    Comments:  Nutrition assessment completed  EN support order set initiated; Fibersource HN rx'd w/ 1 Pro-Stat daily - goal rate 70 ml/hr; daily goal volume 1500 ml, this will meet est needs for kcal.protein, RDI for vitamins and minerals. Free water not added at this time w/ IVF's per discussion w/ intensivist. RD will monitor adequacy of delivery and tolerance.          Reason for Assessment     Row Name 06/15/18 1150          Reason for Assessment    Reason For Assessment TF/PN;physician consult;per organizational policy;identified at risk by screening criteria   MDR, EN support 45 mins     Diagnosis neurologic conditions;cardiac disease;renal disease   AADM w/ CVA; basilar artery occlusion s/p tPA , thrombectomy, stentl, oropharyngeal dysphagia, HTN Hx: HLP, HTN, CKD, CXVA's x2     Identified At Risk by Screening Criteria MST SCORE 2+;tube feeding or parenteral nutrition             Nutrition/Diet History     Row Name 06/15/18 3075          Nutrition/Diet History    Food Allergies other (see comments)   food allergies denied by pt and family members concurred     Factors Affecting Nutritional Intake --   RN reports pt failed FEES ; corpak placed yesterday afternoon. MD w/ v.o. to start tubefeedings, no free water w/ current IVF's , will check tomorrow.  Pt/family understand need for tubefeeding  agreeable to nutrition support.               Labs/Tests/Procedures/Meds     Row Name 06/15/18 1204          Labs/Procedures/Meds    Lab Results Reviewed reviewed, pertinent     Lab Results Comments K+ replaced per RN report will recheck lab later this am.        Diagnostic Tests/Procedures    Diagnostic Test/Procedure Reviewed reviewed, pertinent     Diagnostic Test/Procedures Comments FEES 6/14 - oropharyngeal dysphagia        Medications    Pertinent  Medications Reviewed reviewed, pertinent     Pertinent Medications Comments cardene infusing at 50 ml/hr; NS dc'd             Physical Findings     Row Name 06/15/18 1205          Physical Findings    Overall Physical Appearance overweight     Tubes nasoduodenal tube   confirmed by KUB 6/14 - first portion of duodenum             Estimated/Assessed Needs     Row Name 06/15/18 1206          Calculation Measurements    Weight Used For Calculations 71.2 kg (156 lb 15.5 oz)   awaiting bedscale wt; current wt reported        Estimated/Assessed Needs    Additional Documentation Calorie Requirements (Group);Protein Requirements (Group);Fluid Requirements (Group)        Calorie Requirements    Estimated Calorie Requirement (kcal/day) 1750     Estimated Calorie Need Method kcal/kg;[place]-St Jeor     Estimated Calorie Requirement Comment 7380-3037        KCAL/KG    20 Kcal/Kg (kcal) 1424     25 Kcal/Kg (kcal) 1780     30 Kcal/Kg (kcal) 2136        [place]-St. Jeor Equation    RMR ([place]-St. Jeor Equation) 1212.88 x 1.3=1577        Protein Requirements    Est Protein Requirement Amount (gms/kg) 1.2 gm protein     Estimated Protein Requirements (gms/day) 85.44        Fluid Requirements    Estimated Fluid Requirements (mL/day) 1750   20-30 ml/kg =6412-3665 ml/d     RDA Method (mL) 1750                   Nutrition Prescription Ordered     Row Name 06/15/18 1210          Nutrition Prescription PO    Current PO Diet NPO        Nutrition Prescription EN    Enteral Route ND             Evaluation of Received Nutrient/Fluid Intake     Row Name 06/15/18 1206          Calculation Measurements    Weight Used For Calculations 71.2 kg (156 lb 15.5 oz)   awaiting bedscale wt; current wt reported             Evaluation of Prescribed Nutrient/Fluid Intake     Row Name 06/15/18 1206          Calculation Measurements    Weight Used For Calculations 71.2 kg (156 lb 15.5 oz)   awaiting bedscale wt; current wt reported            Problem/Interventions:        Problem 1     Row Name 06/15/18 1212          Nutrition Diagnoses Problem 1    Problem 1 Inadequate Intake/Infusion     Inadequate Intake Type Oral     Etiology (related to) Functional Diagnosis     Functional Diagnosis Dysphagia     Signs/Symptoms (evidenced by) SLP/Swallow eval;NPO     Swallow eval status Done     Type of SLP Evaluation --   FEES                     Intervention Goal     Row Name 06/15/18 1212          Intervention Goal    General Nutrition support treatment;Meet nutritional needs for age/condition     TF/PN Inititiate TF/PN;Establish TF tolerance             Nutrition Intervention     Row Name 06/15/18 1212          Nutrition Intervention    RD/Tech Action Recommend/ordered;Follow Tx progress;Care plan reviewd     Recommended/Ordered EN             Nutrition Prescription     Row Name 06/15/18 1212          Nutrition Prescription EN    Enteral Prescription Enteral begin/change;Enteral to supply     Enteral Route ND     Product Fibersource HN     Modulars Liquid Protein (15 gm/30 mL)     Liquid Protein (15 gm/30 mL) 30 mL/1 packet     Protein Liquid Frequency Daily     TF Delivery Method Continuous     Continuous TF Goal Rate (mL/hr) 70 mL/hr     Continuous TF Starting Rate (mL/hr) 20 mL/hr     Continuous TF Goal Volume (mL) 1500 mL     Continuous TF Starting Volume (mL) 480 mL     Water flush (mL)  0 mL   w/ medication administration     New EN Prescription Ordered? Yes        EN to Supply    Kcal/Day 1900 Kcal/Day     Kcal/Kg 27 Kcal/Kg     Kcal/Kg Weight Method Actual weight     Protein (gm/day) 96 gm/day     Meet Estimated Protein Need (%) 113 %   1.3 gm/kg     TF Free H2O (mL) 1215 mL     Total Free H2O (mL/day) 1215 mL/day     Fiber Per Day (gm/day) 22 gm/day        Other Orders    Labs NUT Panel;CRP;PreAlb;Mg++;Phos     Labs Ordered? Yes     Other Will check Mg++, phos w/ initiation of feeding ; K+ replacement ordered             Education/Evaluation      Row Name 06/15/18 9996          Education    Education Provided education regarding     Provided education regarding Other (comment)   enteral nutrition support/dysphagi and NPO status        Monitor/Evaluation    Monitor Per protocol;I&O;Pertinent labs;TF delivery/tolerance;Symptoms         Electronically signed by:  Danette Cuevas RD  06/15/18 12:17 PM

## 2018-06-15 NOTE — THERAPY EVALUATION
Acute Care - Occupational Therapy Initial Evaluation  [facility]     Patient Name: Jessy Harmon  : 1945  MRN: [id number]  Today's Date: 6/15/2018  Onset of Illness/Injury or Date of Surgery: 18  Date of Referral to OT: 18  Referring Physician: MD Renu    Admit Date: 2018       ICD-10-CM ICD-9-CM   1. Cerebrovascular accident (CVA), unspecified mechanism I63.9 434.91   2. Vertebrobasilar artery stenosis I65.1 433.30    I65.09    3. Dysarthria R47.1 784.51   4. Weakness of left lower extremity R29.898 729.89   5. Impaired functional mobility, balance, gait, and endurance Z74.09 V49.89   6. Oropharyngeal dysphagia R13.12 787.22   7. Impaired mobility and ADLs Z74.09 799.89     Patient Active Problem List   Diagnosis   • CVA (cerebral vascular accident)   • Hyperlipidemia   • Hypertension   • Chronic kidney disease   • Stroke- History   • Atherosclerotic cerebrovascular disease   • Vertebrobasilar artery stenosis   • Cerebrovascular accident (CVA), basilar artery occlusion, s/p TPA/thrombectomy/stenting     Past Medical History:   Diagnosis Date   • Atherosclerotic cerebrovascular disease    • Chronic kidney disease    • Hyperlipidemia    • Hypertension    • Stroke     Multiple in 2011 and Dec 2011     Past Surgical History:   Procedure Laterality Date   • CEREBRAL ANGIOGRAM     • CEREBRAL ANGIOGRAM N/A 2018    Procedure: Cerebral angiogram;  Surgeon: Gilberto Sears MD;  Location:  [facility] CATH INVASIVE LOCATION;  Service: Interventional Radiology   • CORONARY ARTERY BYPASS GRAFT     • INTERVENTIONAL RADIOLOGY PROCEDURE Bilateral 2018    Procedure: Carotid Cerebral Angiogram;  Surgeon: Gilberto Sears MD;  Location:  [facility] INVASIVE LOCATION;  Service: Interventional Radiology   • INTRACRANIAL ARTERY ANGIOPLASTY            OT ASSESSMENT FLOWSHEET (last 72 hours)      Occupational Therapy Evaluation     Row Name 06/15/18 0825                   OT Evaluation  Time/Intention    Subjective Information complains of;weakness;fatigue  -CL        Document Type evaluation  -CL        Mode of Treatment occupational therapy  -CL        Patient Effort good  -CL        Symptoms Noted During/After Treatment fatigue  -CL           General Information    Patient Profile Reviewed? yes  -CL        Onset of Illness/Injury or Date of Surgery 06/13/18  -CL        Referring Physician Given, MD  -CL        Prior Level of Function independent:;all household mobility;transfer;ADL's;dressing;bathing  -CL        Equipment Currently Used at Home walker, rolling  -CL        Pertinent History of Current Functional Problem Pt presented to the ED 2/2 to slurred speech and weakness. Pt found to have acute CVA w/ occluded intracranial basilar artery. Pt s/p intra-arterial tissue plasminogen activator, mechanical thrombectomy, and stent placement on 06/13.   -CL        Existing Precautions/Restrictions fall;other (see comments)   R sided weakness  -CL        Limitations/Impairments safety/cognitive  -CL        Risks Reviewed patient:;LOB;nausea/vomiting;dizziness;increased discomfort;change in vital signs;lines disloged  -CL        Benefits Reviewed patient:;improve function;increase independence;increase strength;increase balance;decrease pain;increase knowledge  -CL        Barriers to Rehab medically complex;cognitive status  -CL           Relationship/Environment    Lives With grandchild(j carlos)   2 gradsons  -CL           Resource/Environmental Concerns    Current Living Arrangements home/apartment/condo  -CL           Cognitive Assessment/Intervention- PT/OT    Affect/Mental Status (Cognitive) confused;low arousal/lethargic  -CL        Orientation Status (Cognition) oriented to;person;place  -CL        Follows Commands (Cognition) follows one step commands;75-90% accuracy;verbal cues/prompting required;repetition of directions required  -CL        Cognitive Function (Cognitive) safety deficit  -CL         Safety Deficit (Cognitive) moderate deficit;awareness of need for assistance;safety precautions awareness  -CL        Personal Safety Interventions fall prevention program maintained;gait belt;nonskid shoes/slippers when out of bed  -CL           Bed Mobility Assessment/Treatment    Bed Mobility Assessment/Treatment supine-sit  -CL        Supine-Sit [place] (Bed Mobility) maximum assist (25% patient effort);2 person assist;verbal cues  -CL        Assistive Device (Bed Mobility) draw sheet;head of bed elevated  -CL        Comment (Bed Mobility) Upon sitting EOB pt w/ significant R lateral LOB.   -CL           Transfer Assessment/Treatment    Comment (Transfers) Deferred to PT.   -CL           ADL Assessment/Intervention    BADL Assessment/Intervention lower body dressing;grooming  -CL           Lower Body Dressing Assessment/Training    Lower Body Dressing [place] Level don;socks;dependent (less than 25% patient effort)  -CL        Lower Body Dressing Position supine  -CL           Grooming Assessment/Training    [place] Level (Grooming) wash face, hands;dependent (less than 25% patient effort)  -CL        Grooming Position supported sitting  -CL           General ROM    GENERAL ROM COMMENTS BUE grossly WFL.   -CL           General Assessment (Manual Muscle Testing)    Comment, General Manual Muscle Testing (MMT) Assessment LUE grossly 4-/5. RUE  3-/5, wrist 3-/5, sup/pro 3-/5, bicep 2-/5, tricep 3/5, shldr FE 3-/5.   -CL           Motor Assessment/Interventions    Additional Documentation Balance (Group)  -CL           Balance    Balance static sitting balance  -CL           Static Sitting Balance    Level of [place] (Unsupported Sitting, Static Balance) maximal assist, 25 to 49% patient effort   Progressed to moments of CGA at EOB  -CL        Sitting Position (Unsupported Sitting, Static Balance) sitting on edge of bed  -CL        Time Able to Maintain Position (Unsupported Sitting,  Static Balance) 2 to 3 minutes  -CL        Comment (Unsupported Sitting, Static Balance) L lateral lean w/ LUE weight bearing, BLE support, RUE support  -CL           Sensory Assessment/Intervention    Sensory General Assessment light touch sensation deficits identified  -CL        Additional Documentation Vision Assessment/Intervention (Group)  -CL           Light Touch Sensation Assessment    Left Upper Extremity: Light Touch Sensation Assessment intact  -CL        Right Upper Extremity: Light Touch Sensation Assessment mild impairment, 75% or more correct responses  -CL           Vision Assessment/Intervention    Visual Impairment/Limitations diplopia   Limited formal assessment d/t cognitive status  -CL           Positioning and Restraints    Pre-Treatment Position in bed  -CL        Post Treatment Position bed  -CL        In Bed notified nsg;sitting EOB;call light within reach;encouraged to call for assist;with PT   Pt transitioned to PT treatment  -CL           Pain Scale: Numbers Pre/Post-Treatment    Pain Scale: Numbers, Pretreatment 0/10 - no pain  -CL        Pain Scale: Numbers, Post-Treatment 0/10 - no pain  -CL           Clinical Impression (OT)    Date of Referral to OT 06/14/18  -CL        OT Diagnosis Decreased independence in ADLs.   -CL        Patient/Family Goals Statement (OT Eval) Return to PLOF.   -CL        Criteria for Skilled Therapeutic Interventions Met (OT Eval) yes;treatment indicated  -CL        Rehab Potential (OT Eval) good, to achieve stated therapy goals  -CL        Therapy Frequency (OT Eval) daily  -CL        Anticipated Equipment Needs at Discharge (OT) --   TBA further  -CL        Anticipated Discharge Disposition (OT) inpatient rehabilitation facility  -CL           Vital Signs    Pre Systolic BP Rehab 130  -CL        Pre Treatment Diastolic BP 73  -CL        Post Systolic BP Rehab --   w/ PT  -CL        Pretreatment Heart Rate (beats/min) 74  -CL        Pre SpO2 (%) 92  -CL         O2 Delivery Pre Treatment supplemental O2  -CL           OT Goals    Transfer Goal Selection (OT) transfer, OT goal 1  -CL        Dressing Goal Selection (OT) dressing, OT goal 1  -CL        Grooming Goal Selection (OT) grooming, OT goal 1  -CL        Strength Goal Selection (OT) strength, OT goal 1  -CL        Additional Documentation Grooming Goal Selection (OT) (Row);Strength Goal Selection (OT) (Row)  -CL           Transfer Goal 1 (OT)    Activity/Assistive Device (Transfer Goal 1, OT) sit-to-stand/stand-to-sit;toilet  -CL        [place] Level/Cues Needed (Transfer Goal 1, OT) moderate assist (50-74% patient effort);verbal cues required  -CL        Time Frame (Transfer Goal 1, OT) by discharge;long term goal (LTG)  -CL        Progress/Outcome (Transfer Goal 1, OT) goal ongoing  -CL           Dressing Goal 1 (OT)    Activity/Assistive Device (Dressing Goal 1, OT) upper body dressing   leelee-dressing technique  -CL        [place]/Cues Needed (Dressing Goal 1, OT) moderate assist (50-74% patient effort);verbal cues required  -CL        Time Frame (Dressing Goal 1, OT) by discharge;long term goal (LTG)  -CL        Progress/Outcome (Dressing Goal 1, OT) goal ongoing  -CL           Grooming Goal 1 (OT)    Activity/Device (Grooming Goal 1, OT) wash face, hands   sitting unsupported  -CL        [place] (Grooming Goal 1, OT) minimum assist (75% or more patient effort);verbal cues required  -CL        Time Frame (Grooming Goal 1, OT) by discharge;long term goal (LTG)  -CL        Progress/Outcome (Grooming Goal 1, OT) goal ongoing  -CL           Strength Goal 1 (OT)    Strength Goal 1 (OT) Pt will tolerate LUE AROM and RUE AAROM HEP x15 reps to promote ADL performance.   -CL        Time Frame (Strength Goal 1, OT) by discharge;long term goal (LTG)  -CL        Progress/Outcome (Strength Goal 1, OT) goal ongoing  -CL           Living Environment    Home Accessibility tub/shower is not walk in  -CL           User Key  (r) = Recorded By, (t) = Taken By, (c) = Cosigned By    Initials Name Effective Dates    CL Radha Becerril OT 04/03/18 -            Occupational Therapy Education     Title: PT OT SLP Therapies (Active)     Topic: Occupational Therapy (Active)     Point: ADL training (Active)     Description: Instruct learner(s) on proper safety adaptation and remediation techniques during self care or transfers.   Instruct in proper use of assistive devices.   Learning Progress Summary     Learner Status Readiness Method Response Comment Documented by    Patient Active Acceptance E NR Pt educated on appropriate safety precautions, t/f techniques, positioning, role of OT, and benefits of therapy.  06/15/18 0955          Point: Precautions (Active)     Description: Instruct learner(s) on prescribed precautions during self-care and functional transfers.   Learning Progress Summary     Learner Status Readiness Method Response Comment Documented by    Patient Active Acceptance E NR Pt educated on appropriate safety precautions, t/f techniques, positioning, role of OT, and benefits of therapy.  06/15/18 0955          Point: Body mechanics (Active)     Description: Instruct learner(s) on proper positioning and spine alignment during self-care, functional mobility activities and/or exercises.   Learning Progress Summary     Learner Status Readiness Method Response Comment Documented by    Patient Active Acceptance E NR Pt educated on appropriate safety precautions, t/f techniques, positioning, role of OT, and benefits of therapy.  06/15/18 0955                      User Key     Initials Effective Dates Name Provider Type Discipline     04/03/18 -  Radha Becerril OT Occupational Therapist OT                  OT Recommendation and Plan  Outcome Summary/Treatment Plan (OT)  Anticipated Equipment Needs at Discharge (OT):  (TBA further)  Anticipated Discharge Disposition (OT): inpatient rehabilitation facility  Therapy Frequency  (OT Michelle): daily  Plan of Care Review  Plan of Care Reviewed With: patient  Plan of Care Reviewed With: patient  Outcome Summary: OT completed a brief chart review relating to presenting physical/cognitive deficits. Pt Max Ax2 for bed mobility and static sitting balance, Dep for ADLs this date. Recommend cont skilled IPOT intervention. Recommend pt DC to IP rehab.           Outcome Measures     Row Name 06/15/18 0825 06/14/18 1129          How much help from another person do you currently need...    Turning from your back to your side while in flat bed without using bedrails?  -- 2  -LS     Moving from lying on back to sitting on the side of a flat bed without bedrails?  -- 2  -LS     Moving to and from a bed to a chair (including a wheelchair)?  -- 2  -LS     Standing up from a chair using your arms (e.g., wheelchair, bedside chair)?  -- 2  -LS     Climbing 3-5 steps with a railing?  -- 1  -LS     To walk in hospital room?  -- 1  -LS     AM-PAC 6 Clicks Score  -- 10  -LS        How much help from another is currently needed...    Putting on and taking off regular lower body clothing? 1  -CL  --     Bathing (including washing, rinsing, and drying) 1  -CL  --     Toileting (which includes using toilet bed pan or urinal) 1  -CL  --     Putting on and taking off regular upper body clothing 2  -CL  --     Taking care of personal grooming (such as brushing teeth) 2  -CL  --     Eating meals 1  -CL  --     Score 8  -CL  --        Modified [place] Scale    Modified [place] Scale 4 - Moderately severe disability.  Unable to walk without assistance, and unable to attend to own bodily needs without assistance.  -CL 4 - Moderately severe disability.  Unable to walk without assistance, and unable to attend to own bodily needs without assistance.  -LS        Functional Assessment    Outcome Measure Options AM-PAC 6 Clicks Daily Activity (OT)  -CL AM-PAC 6 Clicks Basic Mobility (PT);Modified Giorgio  -LS       User Key  (r) =  Recorded By, (t) = Taken By, (c) = Cosigned By    Initials Name Provider Type    LS Piedad Tobias, PT Physical Therapist    CL Radha Becerril OT Occupational Therapist          Time Calculation:   OT Start Time: 0825  Therapy Suggested Charges     Code   Minutes Charges    None           Therapy Charges for Today     Code Description Service Date Service Provider Modifiers Qty    16427745911 HC OT EVAL LOW COMPLEXITY 3 6/15/2018 Radha Becerril OT GO 1    12264210091 HC OT THER SUPP EA 15 MIN 6/15/2018 Radha Becerril OT GO 1               Radha Becerril OT  6/15/2018

## 2018-06-15 NOTE — THERAPY TREATMENT NOTE
Acute Care - Physical Therapy Treatment Note  [facility]     Patient Name: Jessy Harmon  : 1945  MRN: [id number]  Today's Date: 6/15/2018  Onset of Illness/Injury or Date of Surgery: 18  Date of Referral to PT: 18  Referring Physician: MD Renu    Admit Date: 2018    Visit Dx:    ICD-10-CM ICD-9-CM   1. Cerebrovascular accident (CVA), unspecified mechanism I63.9 434.91   2. Vertebrobasilar artery stenosis I65.1 433.30    I65.09    3. Dysarthria R47.1 784.51   4. Weakness of left lower extremity R29.898 729.89   5. Impaired functional mobility, balance, gait, and endurance Z74.09 V49.89   6. Oropharyngeal dysphagia R13.12 787.22   7. Impaired mobility and ADLs Z74.09 799.89     Patient Active Problem List   Diagnosis   • CVA (cerebral vascular accident)   • Hyperlipidemia   • Hypertension   • Chronic kidney disease   • Stroke- History   • Atherosclerotic cerebrovascular disease   • Vertebrobasilar artery stenosis   • Cerebrovascular accident (CVA), basilar artery occlusion, s/p TPA/thrombectomy/stenting       Therapy Treatment          Rehabilitation Treatment Summary     Row Name 06/15/18 0836             Treatment Time/Intention    Discipline physical therapist  -LS      Document Type therapy note (daily note)  -LS      Subjective Information no complaints  -LS      Mode of Treatment physical therapy  -LS      Patient Effort good  -LS      Existing Precautions/Restrictions fall   R-sided weakness  -LS      Treatment Considerations/Comments Eye patch to L eye intact (to address double vision)  -LS      Recorded by [LS] Piedad Tobias, PT 06/15/18 1007      Row Name 06/15/18 0836             Vital Signs    Pre Systolic BP Rehab 130  -LS      Pre Treatment Diastolic BP 73  -LS      Post Systolic BP Rehab 133  -LS      Post Treatment Diastolic BP 77  -LS      Posttreatment Heart Rate (beats/min) 72  -LS      O2 Delivery Pre Treatment supplemental O2  -LS      Post SpO2 (%) 96  -LS      O2  Delivery Post Treatment supplemental O2  -LS      Pre Patient Position Sitting  -LS      Intra Patient Position Standing  -LS      Post Patient Position Sitting  -LS      Recorded by [LS] Piedad Tobias, PT 06/15/18 1007      Row Name 06/15/18 0836             Cognitive Assessment/Intervention    Additional Documentation Cognitive Assessment/Intervention (Group)  -LS      Recorded by [LS] Piedad Tobias, PT 06/15/18 1007      Row Name 06/15/18 0836             Cognitive Assessment/Intervention- PT/OT    Affect/Mental Status (Cognitive) low arousal/lethargic  -LS      Orientation Status (Cognition) oriented to;person;time;situation  -LS      Follows Commands (Cognition) follows one step commands;75-90% accuracy  -LS      Cognitive Function (Cognitive) safety deficit  -LS      Safety Deficit (Cognitive) moderate deficit;awareness of need for assistance;insight into deficits/self awareness;safety precautions follow-through/compliance  -LS      Recorded by [LS] Piedad Tobias, PT 06/15/18 1007      Row Name 06/15/18 0836             Bed Mobility Assessment/Treatment    Comment (Bed Mobility) EOB with OT upon arrival  -LS      Recorded by [LS] Piedad Tobias, PT 06/15/18 1007      Row Name 06/15/18 0836             Transfer Assessment/Treatment    Transfer Assessment/Treatment sit-stand transfer;stand-sit transfer;bed-chair transfer  -LS      Comment (Transfers) VC's for upright and midline posture. PT/tech on either side of pt for BUE support.   -LS      Bed-Chair [place] (Transfers) maximum assist (25% patient effort);2 person assist;verbal cues  -LS      Sit-Stand [place] (Transfers) maximum assist (25% patient effort);2 person assist;verbal cues  -LS      Stand-Sit [place] (Transfers) maximum assist (25% patient effort);2 person assist;verbal cues  -LS      Recorded by [LS] Piedad Tobias, PT 06/15/18 1007      Row Name 06/15/18 0836             Gait/Stairs Assessment/Training    [place] Level (Gait)  unable to assess  -LS      Recorded by [LS] Piedad Tobias, PT 06/15/18 1007      Row Name 06/15/18 0836             Motor Skills Assessment/Interventions    Additional Documentation Therapeutic Exercise (Group);Balance (Group)  -LS      Recorded by [LS] Piedad Tobias, PT 06/15/18 1007      Row Name 06/15/18 0836             Therapeutic Exercise    Therapeutic Exercise seated, lower extremities  -LS      Additional Documentation Therapeutic Exercise (Row)  -LS      24952 - PT Therapeutic Exercise Minutes 8  -LS      60165 - PT Therapeutic Activity Minutes 7  -LS      Recorded by [LS] Piedad Tobias, PT 06/15/18 1007      Row Name 06/15/18 0836             Lower Extremity Seated Therapeutic Exercise    Performed, Seated Lower Extremity (Therapeutic Exercise) LAQ (long arc quad), knee extension;hip flexion/extension;hip abduction/adduction;ankle dorsiflexion/plantarflexion   also quad and glut sets in long sitting  -LS      Exercise Type, Seated Lower Extremity (Therapeutic Exercise) AAROM (active assistive range of motion)   req'd A with RLE  -LS      Sets/Reps Detail, Seated Lower Extremity (Therapeutic Exercise) 1/10 BLE  -LS      Recorded by [LS] Piedad Tobias, PT 06/15/18 1007      Row Name 06/15/18 0836             Static Sitting Balance    Level of [place] (Unsupported Sitting, Static Balance) moderate assist, 50 to 74% patient effort   moments of CGA with lean onto L UE  -LS      Sitting Position (Unsupported Sitting, Static Balance) sitting on edge of bed  -LS      Time Able to Maintain Position (Unsupported Sitting, Static Balance) 3 to 4 minutes  -LS      Recorded by [LS] Piedad Tobias, PT 06/15/18 1007      Row Name 06/15/18 0836             Static Standing Balance    Level of [place] (Supported Standing, Static Balance) maximal assist, 25 to 49% patient effort   x2  -LS      Time Able to Maintain Position (Supported Standing, Static Balance) less than 15 seconds  -LS      Recorded by [LS] Piedad JORGE  Micheline, PT 06/15/18 1007      Row Name 06/15/18 0836             Positioning and Restraints    Pre-Treatment Position in bed  -LS      Post Treatment Position chair  -LS      In Chair notified nsg;reclined;call light within reach;encouraged to call for assist;exit alarm on;RUE elevated;LUE elevated;waffle cushion;on mechanical lift sling;legs elevated;heels elevated  -LS      Recorded by [LS] Piedad Tobias, PT 06/15/18 1007      Row Name 06/15/18 0836             Pain Scale: Numbers Pre/Post-Treatment    Pain Scale: Numbers, Pretreatment 0/10 - no pain  -LS      Pain Scale: Numbers, Post-Treatment 0/10 - no pain  -LS      Recorded by [LS] Piedad Tobias, PT 06/15/18 1007      Row Name 06/15/18 0836             Plan of Care Review    Plan of Care Reviewed With patient  -LS      Recorded by [LS] Piedad Tobias, PT 06/15/18 1007      Row Name 06/15/18 0836             Outcome Summary/Treatment Plan (PT)    Daily Summary of Progress (PT) progress toward functional goals is good  -LS      Recorded by [LS] Piedad Tobias, PT 06/15/18 1007        User Key  (r) = Recorded By, (t) = Taken By, (c) = Cosigned By    Initials Name Effective Dates Discipline    LS Piedad Tobias, PT 06/19/15 -  PT                     Physical Therapy Education     Title: PT OT SLP Therapies (Active)     Topic: Physical Therapy (Active)     Point: Mobility training (Active)    Learning Progress Summary     Learner Status Readiness Method Response Comment Documented by    Patient Done Acceptance E,D VU,NR   06/15/18 1007     Active Acceptance E,D NR   06/14/18 1529    Family Active Acceptance E,D NR   06/14/18 1529          Point: Home exercise program (Active)    Learning Progress Summary     Learner Status Readiness Method Response Comment Documented by    Patient Done Acceptance E,D VU,NR  LS 06/15/18 1007     Active Acceptance E,D NR   06/14/18 1529    Family Active Acceptance E,D NR   06/14/18 1529          Point: Body mechanics (Active)     Learning Progress Summary     Learner Status Readiness Method Response Comment Documented by    Patient Done Acceptance E,D VU,NR  LS 06/15/18 1007     Active Acceptance E,D NR  LS 06/14/18 1529    Family Active Acceptance E,D NR  LS 06/14/18 1529          Point: Precautions (Active)    Learning Progress Summary     Learner Status Readiness Method Response Comment Documented by    Patient Done Acceptance E,D VU,NR  LS 06/15/18 1007     Active Acceptance E,D NR  LS 06/14/18 1529    Family Active Acceptance E,D NR  LS 06/14/18 1529                      User Key     Initials Effective Dates Name Provider Type Discipline     06/19/15 -  Piedad Tobias, PT Physical Therapist PT                    PT Recommendation and Plan  Anticipated Discharge Disposition (PT): inpatient rehabilitation facility  Planned Therapy Interventions (PT Eval): balance training, bed mobility training, gait training, home exercise program, patient/family education, strengthening, transfer training  Therapy Frequency (PT Clinical Impression): daily  Outcome Summary/Treatment Plan (PT)  Daily Summary of Progress (PT): progress toward functional goals is good  Anticipated Discharge Disposition (PT): inpatient rehabilitation facility  Plan of Care Reviewed With: patient  Progress: improving  Outcome Summary: Pt demonstrated ability to progress to moments of CGA during EOB balance activities; gave good effort with progression of LE HEP. Cont to be limited by R-sided weakness; will cont to progress as clinically warranted.           Outcome Measures     Row Name 06/15/18 0836 06/15/18 0825 06/14/18 1129       How much help from another person do you currently need...    Turning from your back to your side while in flat bed without using bedrails? 2  -LS  -- 2  -LS    Moving from lying on back to sitting on the side of a flat bed without bedrails? 2  -LS  -- 2  -LS    Moving to and from a bed to a chair (including a wheelchair)? 2  -LS  -- 2  -LS     Standing up from a chair using your arms (e.g., wheelchair, bedside chair)? 2  -LS  -- 2  -LS    Climbing 3-5 steps with a railing? 1  -LS  -- 1  -LS    To walk in hospital room? 1  -LS  -- 1  -LS    AM-PAC 6 Clicks Score 10  -LS  -- 10  -LS       How much help from another is currently needed...    Putting on and taking off regular lower body clothing?  -- 1  -CL  --    Bathing (including washing, rinsing, and drying)  -- 1  -CL  --    Toileting (which includes using toilet bed pan or urinal)  -- 1  -CL  --    Putting on and taking off regular upper body clothing  -- 2  -CL  --    Taking care of personal grooming (such as brushing teeth)  -- 2  -CL  --    Eating meals  -- 1  -CL  --    Score  -- 8  -CL  --       Modified [place] Scale    Modified Giorgio Scale  -- 4 - Moderately severe disability.  Unable to walk without assistance, and unable to attend to own bodily needs without assistance.  -CL 4 - Moderately severe disability.  Unable to walk without assistance, and unable to attend to own bodily needs without assistance.  -LS       Functional Assessment    Outcome Measure Options AM-PAC 6 Clicks Basic Mobility (PT)  -LS AM-PAC 6 Clicks Daily Activity (OT)  -CL AM-PAC 6 Clicks Basic Mobility (PT);Modified [place]  -LS      User Key  (r) = Recorded By, (t) = Taken By, (c) = Cosigned By    Initials Name Provider Type    HEIDY Tobias, PT Physical Therapist    CL Radha Becerril, OT Occupational Therapist           Time Calculation:         PT Charges     Row Name 06/15/18 0836             Time Calculation    Start Time 0836  -      PT Received On 06/15/18  -         Time Calculation- PT    Total Timed Code Minutes- PT 15 minute(s)  -LS         Timed Charges    24915 - PT Therapeutic Exercise Minutes 8  -LS      66528 - PT Therapeutic Activity Minutes 7  -LS        User Key  (r) = Recorded By, (t) = Taken By, (c) = Cosigned By    Initials Name Provider Type    HEIDY Tobias, PT Physical Therapist        Therapy  Suggested Charges     Code   Minutes Charges    90862 (CPT®) Hc Pt Neuromusc Re Education Ea 15 Min      25819 (CPT®) Hc Pt Ther Proc Ea 15 Min 8 1    68905 (CPT®) Hc Gait Training Ea 15 Min      90737 (CPT®) Hc Pt Therapeutic Act Ea 15 Min 7     92256 (CPT®) Hc Pt Manual Therapy Ea 15 Min      02368 (CPT®) Hc Pt Iontophoresis Ea 15 Min      61221 (CPT®) Hc Pt Elec Stim Ea-Per 15 Min      25972 (CPT®) Hc Pt Ultrasound Ea 15 Min      93440 (CPT®) Hc Pt Self Care/Mgmt/Train Ea 15 Min      Total  15 1        Therapy Charges for Today     Code Description Service Date Service Provider Modifiers Qty    14911440113 HC PT EVAL MOD COMPLEXITY 4 6/14/2018 Piedad Tobias, PT GP 1    54917761594 HC PT THER SUPP EA 15 MIN 6/14/2018 Piedad Tobias, PT GP 2    42126248334 HC PT THER PROC EA 15 MIN 6/15/2018 Piedad Tobias, PT GP 1    26199784603 HC PT THER SUPP EA 15 MIN 6/15/2018 Piedad Tobias, PT GP 1          PT G-Codes  Outcome Measure Options: AM-PAC 6 Clicks Basic Mobility (PT)    Piedad Tobias, PT  6/15/2018

## 2018-06-15 NOTE — PLAN OF CARE
Problem: Patient Care Overview  Goal: Plan of Care Review  Outcome: Ongoing (interventions implemented as appropriate)   06/15/18 0836   Plan of Care Review   Progress improving   Coping/Psychosocial   Plan of Care Reviewed With patient   OTHER   Outcome Summary Pt demonstrated ability to progress to moments of CGA during EOB balance activities; gave good effort with progression of LE HEP. Cont to be limited by R-sided weakness; will cont to progress as clinically warranted.        Problem: Stroke (Ischemic) (Adult)  Goal: Signs and Symptoms of Listed Potential Problems Will be Absent, Minimized or Managed (Stroke)  Outcome: Ongoing (interventions implemented as appropriate)   06/15/18 0836   Goal/Outcome Evaluation   Problems Assessed (Stroke (Ischemic)) cognitive impairment;communication impairment;motor/sensory impairment   Problems Assessed (Stroke (Ischemic)) cognitive impairment;communication impairment;motor/sensory impairment

## 2018-06-16 LAB — GLUCOSE BLDC GLUCOMTR-MCNC: 131 MG/DL (ref 70–130)

## 2018-06-16 PROCEDURE — 25010000002 HYDRALAZINE PER 20 MG: Performed by: NURSE PRACTITIONER

## 2018-06-16 PROCEDURE — 82962 GLUCOSE BLOOD TEST: CPT

## 2018-06-16 PROCEDURE — 99232 SBSQ HOSP IP/OBS MODERATE 35: CPT | Performed by: INTERNAL MEDICINE

## 2018-06-16 RX ADMIN — CLOPIDOGREL BISULFATE 75 MG: 75 TABLET ORAL at 08:26

## 2018-06-16 RX ADMIN — ASPIRIN 81 MG CHEWABLE TABLET 81 MG: 81 TABLET CHEWABLE at 08:26

## 2018-06-16 RX ADMIN — Medication 1 PACKET: at 08:27

## 2018-06-16 RX ADMIN — Medication 20 MG: at 23:12

## 2018-06-16 RX ADMIN — ATORVASTATIN CALCIUM 80 MG: 40 TABLET, FILM COATED ORAL at 21:09

## 2018-06-16 RX ADMIN — AMLODIPINE BESYLATE 7.5 MG: 2.5 TABLET ORAL at 08:26

## 2018-06-16 NOTE — PROGRESS NOTES
"Intensive Care Follow-up     Hospital:  LOS: 3 days   Ms. Jessy Harmon, 72 y.o. female is followed for:   Cerebrovascular accident (CVA)        Subjective   Interval History:  The chart is been reviewed. There were no events overnight. The patient remained afebrile. She continues to have profound dysarthria.    The patient's relevant past medical, surgical and social history were reviewed and updated in Epic as appropriate.        Objective     Infusions:    niCARdipine 5-15 mg/hr Last Rate: Stopped (06/15/18 1600)   phenylephrine (BALA-SYNEPHRINE) 50 mg/250 (0.2 mg/mL) infusion 0.5-3 mcg/kg/min      Medications:    amLODIPine 7.5 mg Oral Q24H   aspirin 81 mg Oral Daily   atorvastatin 80 mg Oral Nightly   clopidogrel 75 mg Oral Daily   PRO-STAT 1 packet Nasogastric Daily       Vital Sign Min/Max for last 24 hours  Temp  Min: 98 °F (36.7 °C)  Max: 98.3 °F (36.8 °C)   BP  Min: 126/73  Max: 156/90   Pulse  Min: 61  Max: 77   Resp  Min: 14  Max: 18   SpO2  Min: 93 %  Max: 100 %   Flow (L/min)  Min: 2  Max: 2       Input/Output for last 24 hour shift  06/15 0701 - 06/16 0700  In: 320   Out: 1525 [Urine:1525]      Objective:  General Appearance:  Ill-appearing, in no acute distress and uncomfortable.    Vital signs: (most recent): Blood pressure 143/85, pulse 67, temperature 98.2 °F (36.8 °C), temperature source Axillary, resp. rate 16, height 165.1 cm (65\"), weight 71.2 kg (157 lb), SpO2 96 %.  No fever.    Output: Producing urine.    HEENT: Normal HEENT exam.    Lungs:  Normal effort and normal respiratory rate.  Breath sounds clear to auscultation.  She is not in respiratory distress.  No stridor.  No rales, decreased breath sounds, wheezes or rhonchi.    Heart: Normal rate.  Regular rhythm.  S1 normal and S2 normal.  No murmur or friction rub.   Chest: Symmetric chest wall expansion.   Abdomen: Abdomen is soft and non-distended.  Bowel sounds are normal.   There is no abdominal tenderness.   There is no mass. "   Extremities: Decreased range of motion.  There is no deformity or dependent edema.    Neurological: Patient is oriented to person, place and time.  (Patient is alert. Dysarthric. Generalized weakness throughout all 4 extremities with no focality.).    Pupils:  Pupils are equal, round, and reactive to light.  Pupils are equal.   Skin:  Warm.  No rash, ecchymosis or cyanosis.               Results from last 7 days  Lab Units 06/15/18  0414 06/14/18  0512 06/13/18  0929   WBC 10*3/mm3 10.92* 8.30 7.48   HEMOGLOBIN g/dL 11.4* 15.2 16.2*   PLATELETS 10*3/mm3 233 288 268       Results from last 7 days  Lab Units 06/15/18  1938 06/15/18  1308 06/15/18  0414 06/14/18  0512 06/13/18  0916   SODIUM mmol/L  --   --  140 139  --    POTASSIUM mmol/L 4.8  --  3.3* 3.7  --    CO2 mmol/L  --   --  20.0 23.0  --    BUN mg/dL  --   --  18 16  --    CREATININE mg/dL  --   --  0.60 0.83 0.90   MAGNESIUM mg/dL  --  2.1  --   --   --    PHOSPHORUS mg/dL  --  2.6  --   --   --    GLUCOSE mg/dL  --   --  108* 130*  --      Estimated Creatinine Clearance: 61.1 mL/min (by C-G formula based on SCr of 0.6 mg/dL).          I reviewed the patient's results and images.     Assessment/Plan   Impression      Principal Problem:    Cerebrovascular accident (CVA), basilar artery occlusion, s/p TPA/thrombectomy/stenting  Active Problems:    Hyperlipidemia    Hypertension    Chronic kidney disease    Stroke- History       Plan        Continue physical therapy.  Initiate tube feedings through feeding tube.  Continue close blood pressure control.  Further plans per neurosurgery.  Follow-up labs been ordered for tomorrow morning.     Plan of care and goals reviewed with mulitdisciplinary team at daily rounds.   I discussed the patient's findings and my recommendations with patient and nursing staff         Eliu Stern MD, [facility]  Pulmonary and Critical Care Medicine  06/16/18 12:40 PM

## 2018-06-17 LAB
ANION GAP SERPL CALCULATED.3IONS-SCNC: 8 MMOL/L (ref 3–11)
BASOPHILS # BLD AUTO: 0.03 10*3/MM3 (ref 0–0.2)
BASOPHILS NFR BLD AUTO: 0.2 % (ref 0–1)
BUN BLD-MCNC: 25 MG/DL (ref 9–23)
BUN/CREAT SERPL: 28.7 (ref 7–25)
CA-I SERPL ISE-MCNC: 1.34 MMOL/L (ref 1.12–1.32)
CALCIUM SPEC-SCNC: 9.8 MG/DL (ref 8.7–10.4)
CHLORIDE SERPL-SCNC: 101 MMOL/L (ref 99–109)
CO2 SERPL-SCNC: 27 MMOL/L (ref 20–31)
CREAT BLD-MCNC: 0.87 MG/DL (ref 0.6–1.3)
DEPRECATED RDW RBC AUTO: 43.2 FL (ref 37–54)
EOSINOPHIL # BLD AUTO: 0.08 10*3/MM3 (ref 0–0.3)
EOSINOPHIL NFR BLD AUTO: 0.6 % (ref 0–3)
ERYTHROCYTE [DISTWIDTH] IN BLOOD BY AUTOMATED COUNT: 13.6 % (ref 11.3–14.5)
GFR SERPL CREATININE-BSD FRML MDRD: 77 ML/MIN/1.73
GLUCOSE BLD-MCNC: 189 MG/DL (ref 70–100)
GLUCOSE BLDC GLUCOMTR-MCNC: 147 MG/DL (ref 70–130)
GLUCOSE BLDC GLUCOMTR-MCNC: 156 MG/DL (ref 70–130)
GLUCOSE BLDC GLUCOMTR-MCNC: 161 MG/DL (ref 70–130)
GLUCOSE BLDC GLUCOMTR-MCNC: 176 MG/DL (ref 70–130)
GLUCOSE BLDC GLUCOMTR-MCNC: 178 MG/DL (ref 70–130)
HCT VFR BLD AUTO: 49.5 % (ref 34.5–44)
HGB BLD-MCNC: 15.6 G/DL (ref 11.5–15.5)
IMM GRANULOCYTES # BLD: 0.04 10*3/MM3 (ref 0–0.03)
IMM GRANULOCYTES NFR BLD: 0.3 % (ref 0–0.6)
LYMPHOCYTES # BLD AUTO: 1.48 10*3/MM3 (ref 0.6–4.8)
LYMPHOCYTES NFR BLD AUTO: 11.2 % (ref 24–44)
MCH RBC QN AUTO: 27.4 PG (ref 27–31)
MCHC RBC AUTO-ENTMCNC: 31.5 G/DL (ref 32–36)
MCV RBC AUTO: 87 FL (ref 80–99)
MONOCYTES # BLD AUTO: 1.31 10*3/MM3 (ref 0–1)
MONOCYTES NFR BLD AUTO: 9.9 % (ref 0–12)
NEUTROPHILS # BLD AUTO: 10.31 10*3/MM3 (ref 1.5–8.3)
NEUTROPHILS NFR BLD AUTO: 78.1 % (ref 41–71)
PLATELET # BLD AUTO: 288 10*3/MM3 (ref 150–450)
PMV BLD AUTO: 9.8 FL (ref 6–12)
POTASSIUM BLD-SCNC: 4 MMOL/L (ref 3.5–5.5)
RBC # BLD AUTO: 5.69 10*6/MM3 (ref 3.89–5.14)
SODIUM BLD-SCNC: 136 MMOL/L (ref 132–146)
WBC NRBC COR # BLD: 13.21 10*3/MM3 (ref 3.5–10.8)

## 2018-06-17 PROCEDURE — 25010000002 HYDRALAZINE PER 20 MG: Performed by: NURSE PRACTITIONER

## 2018-06-17 PROCEDURE — 82330 ASSAY OF CALCIUM: CPT | Performed by: INTERNAL MEDICINE

## 2018-06-17 PROCEDURE — 92610 EVALUATE SWALLOWING FUNCTION: CPT

## 2018-06-17 PROCEDURE — 82962 GLUCOSE BLOOD TEST: CPT

## 2018-06-17 PROCEDURE — 85025 COMPLETE CBC W/AUTO DIFF WBC: CPT | Performed by: INTERNAL MEDICINE

## 2018-06-17 PROCEDURE — 99233 SBSQ HOSP IP/OBS HIGH 50: CPT | Performed by: INTERNAL MEDICINE

## 2018-06-17 PROCEDURE — 80048 BASIC METABOLIC PNL TOTAL CA: CPT | Performed by: INTERNAL MEDICINE

## 2018-06-17 RX ADMIN — NICARDIPINE HYDROCHLORIDE 10 MG/HR: 0.1 INJECTION, SOLUTION INTRAVENOUS at 12:57

## 2018-06-17 RX ADMIN — NICARDIPINE HYDROCHLORIDE 15 MG/HR: 0.1 INJECTION, SOLUTION INTRAVENOUS at 11:32

## 2018-06-17 RX ADMIN — NICARDIPINE HYDROCHLORIDE 15 MG/HR: 0.1 INJECTION, SOLUTION INTRAVENOUS at 08:46

## 2018-06-17 RX ADMIN — AMLODIPINE BESYLATE 7.5 MG: 2.5 TABLET ORAL at 08:17

## 2018-06-17 RX ADMIN — NICARDIPINE HYDROCHLORIDE 15 MG/HR: 0.1 INJECTION, SOLUTION INTRAVENOUS at 19:01

## 2018-06-17 RX ADMIN — Medication 20 MG: at 18:15

## 2018-06-17 RX ADMIN — ASPIRIN 81 MG CHEWABLE TABLET 81 MG: 81 TABLET CHEWABLE at 08:17

## 2018-06-17 RX ADMIN — NICARDIPINE HYDROCHLORIDE 15 MG/HR: 0.1 INJECTION, SOLUTION INTRAVENOUS at 10:07

## 2018-06-17 RX ADMIN — NICARDIPINE HYDROCHLORIDE 15 MG/HR: 0.1 INJECTION, SOLUTION INTRAVENOUS at 17:24

## 2018-06-17 RX ADMIN — NICARDIPINE HYDROCHLORIDE 15 MG/HR: 0.2 INJECTION, SOLUTION INTRAVENOUS at 20:30

## 2018-06-17 RX ADMIN — NICARDIPINE HYDROCHLORIDE 12.5 MG/HR: 0.1 INJECTION, SOLUTION INTRAVENOUS at 15:54

## 2018-06-17 RX ADMIN — NICARDIPINE HYDROCHLORIDE 15 MG/HR: 0.1 INJECTION, SOLUTION INTRAVENOUS at 07:08

## 2018-06-17 RX ADMIN — NICARDIPINE HYDROCHLORIDE 2.5 MG/HR: 0.1 INJECTION, SOLUTION INTRAVENOUS at 01:41

## 2018-06-17 RX ADMIN — CLOPIDOGREL BISULFATE 75 MG: 75 TABLET ORAL at 08:18

## 2018-06-17 RX ADMIN — NICARDIPINE HYDROCHLORIDE 15 MG/HR: 0.1 INJECTION, SOLUTION INTRAVENOUS at 04:04

## 2018-06-17 RX ADMIN — Medication 1 PACKET: at 08:21

## 2018-06-17 RX ADMIN — NICARDIPINE HYDROCHLORIDE 10 MG/HR: 0.1 INJECTION, SOLUTION INTRAVENOUS at 05:27

## 2018-06-17 RX ADMIN — NICARDIPINE HYDROCHLORIDE 15 MG/HR: 0.2 INJECTION, SOLUTION INTRAVENOUS at 23:45

## 2018-06-17 RX ADMIN — ATORVASTATIN CALCIUM 80 MG: 40 TABLET, FILM COATED ORAL at 20:33

## 2018-06-17 NOTE — PLAN OF CARE
Problem: Patient Care Overview  Goal: Plan of Care Review  Outcome: Ongoing (interventions implemented as appropriate)      Problem: Skin Injury Risk (Adult)  Goal: Identify Related Risk Factors and Signs and Symptoms  Outcome: Ongoing (interventions implemented as appropriate)      Problem: Stroke (Ischemic) (Adult)  Goal: Signs and Symptoms of Listed Potential Problems Will be Absent, Minimized or Managed (Stroke)  Outcome: Ongoing (interventions implemented as appropriate)

## 2018-06-17 NOTE — PLAN OF CARE
Problem: Patient Care Overview  Goal: Plan of Care Review  Outcome: Ongoing (interventions implemented as appropriate)   06/17/18 4012   Plan of Care Review   Progress (reeval bedside )   Coping/Psychosocial   Plan of Care Reviewed With patient;family   SLP re-evaluation completed. Will continue to address dysphagia. Please see note for further details and recommendations.

## 2018-06-17 NOTE — PROGRESS NOTES
"Intensive Care Follow-up     Hospital:  LOS: 4 days   Ms. Jessy Harmon, 72 y.o. female is followed for:   Cerebrovascular accident (CVA)        Subjective   Interval History:  The chart is been reviewed. The patient is much more alert today. She states that she has not had any headaches or vision changes. She has remained afebrile.    The patient's relevant past medical, surgical and social history were reviewed and updated in Epic as appropriate.        Objective     Infusions:    niCARdipine 5-15 mg/hr Last Rate: 15 mg/hr (06/17/18 1132)   phenylephrine (BALA-SYNEPHRINE) 50 mg/250 (0.2 mg/mL) infusion 0.5-3 mcg/kg/min      Medications:    amLODIPine 7.5 mg Oral Q24H   aspirin 81 mg Oral Daily   atorvastatin 80 mg Oral Nightly   clopidogrel 75 mg Oral Daily   PRO-STAT 1 packet Nasogastric Daily       Vital Sign Min/Max for last 24 hours  Temp  Min: 97.4 °F (36.3 °C)  Max: 98.4 °F (36.9 °C)   BP  Min: 120/68  Max: 166/98   Pulse  Min: 68  Max: 93   Resp  Min: 14  Max: 20   SpO2  Min: 94 %  Max: 100 %   Flow (L/min)  Min: 2  Max: 2       Input/Output for last 24 hour shift  06/16 0701 - 06/17 0700  In: 1955.5 [I.V.:453.9]  Out: 1950 [Urine:1950]      Objective:  General Appearance:  Ill-appearing, in no acute distress and comfortable.    Vital signs: (most recent): Blood pressure 120/68, pulse 77, temperature 98.4 °F (36.9 °C), temperature source Oral, resp. rate 16, height 165.1 cm (65\"), weight 71.2 kg (157 lb), SpO2 97 %.  No fever.    Output: Producing urine.    HEENT: Normal HEENT exam.    Lungs:  Normal effort and normal respiratory rate.  Breath sounds clear to auscultation.  She is not in respiratory distress.  No stridor.  No rales, decreased breath sounds, wheezes or rhonchi.    Heart: Normal rate.  Regular rhythm.  S1 normal and S2 normal.  No murmur or friction rub.   Chest: Symmetric chest wall expansion.   Abdomen: Abdomen is soft and non-distended.  Bowel sounds are normal.   There is no abdominal " tenderness.   There is no mass.   Extremities: Decreased range of motion.  There is no deformity or dependent edema.    Neurological: Patient is alert and oriented to person, place and time.  (Dysarthric speech however is easily understood.).    Pupils:  Pupils are equal, round, and reactive to light.  Pupils are equal.   Skin:  Warm.  No rash, ecchymosis or cyanosis.               Results from last 7 days  Lab Units 06/17/18 0416 06/15/18  0414 06/14/18  0512   WBC 10*3/mm3 13.21* 10.92* 8.30   HEMOGLOBIN g/dL 15.6* 11.4* 15.2   PLATELETS 10*3/mm3 288 233 288       Results from last 7 days  Lab Units 06/17/18  0416 06/15/18  1938 06/15/18  1308 06/15/18  0414 06/14/18  0512   SODIUM mmol/L 136  --   --  140 139   POTASSIUM mmol/L 4.0 4.8  --  3.3* 3.7   CO2 mmol/L 27.0  --   --  20.0 23.0   BUN mg/dL 25*  --   --  18 16   CREATININE mg/dL 0.87  --   --  0.60 0.83   MAGNESIUM mg/dL  --   --  2.1  --   --    PHOSPHORUS mg/dL  --   --  2.6  --   --    GLUCOSE mg/dL 189*  --   --  108* 130*     Estimated Creatinine Clearance: 56.2 mL/min (by C-G formula based on SCr of 0.87 mg/dL).          I reviewed the patient's results and images.     Assessment/Plan   Impression      Principal Problem:    Cerebrovascular accident (CVA), basilar artery occlusion, s/p TPA/thrombectomy/stenting  Active Problems:    Hyperlipidemia    Hypertension    Chronic kidney disease    Stroke- History       Plan        Overall I feel that the patient has improved in terms of speech and alertness.  Follow-up labs been ordered for tomorrow.  Continue with physical therapy.  Continue speech therapy.  If she does not improve in terms of swallowing, she likely will need a PEG.  Continue with nutrition.      Plan of care and goals reviewed with mulitdisciplinary team at daily rounds.   I discussed the patient's findings and my recommendations with patient, family and nursing staff       Eliu Stern MD, [facility]  Pulmonary and Critical Care  Medicine  06/17/18 2:17 PM

## 2018-06-17 NOTE — THERAPY RE-EVALUATION
Acute Care - Speech Language Pathology   Swallow Initial Evaluation [facility]   Clinical Swallow Evaluation       Patient Name: Jessy Harmon  : 1945  MRN: [id number]  Today's Date: 2018  Onset of Illness/Injury or Date of Surgery: 18     Referring Physician: MD Renu      Admit Date: 2018    Visit Dx:     ICD-10-CM ICD-9-CM   1. Cerebrovascular accident (CVA), unspecified mechanism I63.9 434.91   2. Vertebrobasilar artery stenosis I65.1 433.30    I65.09    3. Dysarthria R47.1 784.51   4. Weakness of left lower extremity R29.898 729.89   5. Impaired functional mobility, balance, gait, and endurance Z74.09 V49.89   6. Oropharyngeal dysphagia R13.12 787.22   7. Impaired mobility and ADLs Z74.09 799.89     Patient Active Problem List   Diagnosis   • CVA (cerebral vascular accident)   • Hyperlipidemia   • Hypertension   • Chronic kidney disease   • Stroke- History   • Atherosclerotic cerebrovascular disease   • Vertebrobasilar artery stenosis   • Cerebrovascular accident (CVA), basilar artery occlusion, s/p TPA/thrombectomy/stenting     Past Medical History:   Diagnosis Date   • Atherosclerotic cerebrovascular disease    • Chronic kidney disease    • Hyperlipidemia    • Hypertension    • Stroke     Multiple in 2011 and Dec 2011     Past Surgical History:   Procedure Laterality Date   • CEREBRAL ANGIOGRAM     • CEREBRAL ANGIOGRAM N/A 2018    Procedure: Cerebral angiogram;  Surgeon: Gilberto Sears MD;  Location:  [facility];  Service: Interventional Radiology   • CORONARY ARTERY BYPASS GRAFT     • INTERVENTIONAL RADIOLOGY PROCEDURE Bilateral 2018    Procedure: Carotid Cerebral Angiogram;  Surgeon: Gilberto Sears MD;  Location:  [facility];  Service: Interventional Radiology   • INTRACRANIAL ARTERY ANGIOPLASTY            SWALLOW EVALUATION (last 72 hours)      SLP Adult Swallow Evaluation     Row Name 18 1200 18 1343                 Rehab Evaluation    Document Type evaluation  -AV evaluation  -RD       Subjective Information complains of;weakness  -AV complains of;weakness  -RD       Patient Observations alert;cooperative  -AV cooperative;agree to therapy;lethargic  -RD       Patient/Family Observations grandson present   -AV Family present  -RD       Patient Effort adequate  -AV adequate  -RD       Comment patient reports was using head turn to the right for swallowing before admit to hospital   -AV  --          General Information    Patient Profile Reviewed yes  -AV yes  -RD       Pertinent History Of Current Problem  -- CVA. R wkns. s/p thrombectomy and stenting. see initial eval  -RD       Current Method of Nutrition NPO  -AV NPO  -RD       Precautions/Limitations, Vision  -- vision impairment, bilaterally;vision impairment, left  -RD       Precautions/Limitations, Hearing WFL;for purposes of eval  -AV WFL;for purposes of eval  -RD       Prior Level of Function-Communication  -- WFL  -RD       Prior Level of Function-Swallowing --   patient reports was using head turn to right before admit   -AV no diet consistency restrictions  -RD       Plans/Goals Discussed with patient;family  -AV patient;family;agreed upon  -RD       Barriers to Rehab medically complex  -AV medically complex  -RD       Patient's Goals for Discharge return to PO diet  -AV return to PO diet  -RD       Family Goals for Discharge patient able to return to PO diet  -AV patient able to return to PO diet  -RD          Pain Assessment    Additional Documentation Pain Scale: Numbers Pre/Post-Treatment (Group)  -AV  --          Pain Scale: Numbers Pre/Post-Treatment    Pain Scale: Numbers, Pretreatment 0/10 - no pain  -AV  --       Pain Scale: Numbers, Post-Treatment 0/10 - no pain  -AV  --          Pain Scale: FACES Pre/Post-Treatment    Pain: FACES Scale, Pretreatment  -- 2-->hurts little bit  -RD       Pain: FACES Scale, Post-Treatment  -- 2-->hurts little bit  -RD           Oral Motor and Function    Dentition Assessment edentulous, does not have dentures  -AV  --       Secretion Management anterior loss  -AV  --       Mucosal Quality moist, healthy  -AV  --       Volitional Swallow delayed  -AV  --       Volitional Cough weak  -AV  --          Oral Musculature and Cranial Nerve Assessment    Oral Motor General Assessment generalized oral motor weakness  -AV  --          General Eating/Swallowing Observations    Eating/Swallowing Skills fed by SLP  -AV  --       Positioning During Eating upright 90 degree;upright in bed  -AV  --       Utensils Used spoon  -AV  --       Consistencies Trialed pureed;thin liquids  -AV  --          Clinical Swallow Eval    Oral Prep Phase impaired  -AV  --       Oral Transit impaired  -AV  --       Oral Residue impaired  -AV  --       Pharyngeal Phase suspected pharyngeal impairment  -AV  --       Esophageal Phase unremarkable  -AV  --       Clinical Swallow Evaluation Summary Bedside eval completed this am. PAtient was coughing on secretions prior to eval. Patient with anterior loss with trials. ASked for bolus to be placed on right side of mouth and was using right head turn as patient reports was previously using compsenatory strategy before admitted for this event.  Delayed initiation. Rec FEES to further assess pharyngeal skills. PAtient in agreement.   -AV  --          Fiberoptic Endoscopic Evaluation of Swallowing (FEES)    Risks/Benefits Reviewed  -- risks/benefits explained;patient;agreed to eval  -RD       Nasal Entry  -- right:  -RD          Anatomy and Physiology    Velopharyngeal  -- WFL  -RD       Base of Tongue  -- asymmetrical;range reduced  -RD       Epiglottis  -- WFL  -RD       Laryngeal Function Breathing  -- decreased movement right  -RD       Laryngeal Function Phonation  -- decreased movement right  -RD       Laryngeal Function to Breath Hold  -- can't sustain closure  -RD       Secretion Rating Scale (Chevy et al. 1996)  --  3- secretions inside the laryngeal vestibule/aspiration, not cleared  -RD       Secretion Description  -- thin;clear;white  -RD       Ice Chips  -- DNA  -RD       Spontaneous Swallow  -- frequency reduced;did not clear secretions  -RD       Sensory  -- sensed scope  -RD       Utensils Used  -- Spoon;Cup;Straw  -RD       Consistencies Trialed  -- thin liquids;pudding/puree  -RD          FEES Interpretation    Oral Phase  -- anterior loss;prolonged manipulation;increased A-P transit time;prespill of liquids into pharynx  -RD       Oral Phase, Comment  -- Anterior loss w/ thins. Incr'd prep & transit w/ all consistencies. Pre-spill of both thins and pudding.   -RD          Initiation of Pharyngeal Swallow    Initiation of Pharyngeal Swallow  -- bolus in pyriform sinuses  -RD       Pharyngeal Phase  -- impaired pharyngeal phase of swallowing  -RD       Penetration Before the Swallow  -- thin liquids;pudding/puree;secondary to reduced back of tongue control;secondary to delayed swallow initiation or mistiming  -RD       Aspiration Before the Swallow  -- thin liquids;secondary to reduced back of tongue control;secondary to delayed swallow initiation or mistiming  -RD       Penetration During the Swallow  -- pudding/puree;secondary to delayed swallow initiation or mistiming;secondary to reduced laryngeal elevation;secondary to reduced vestibular closure  -RD       Aspiration During the Swallow  -- thin liquids;pudding/puree;secondary to delayed swallow initiation or mistiming;secondary to reduced laryngeal elevation;secondary to reduced vestibular closure  -RD       Aspiration After the Swallow  -- thin liquids;pudding/puree;secondary to residue;in valleculae;in pyriform sinuses;in laryngeal vestibule  -RD       Response to Aspiration  -- weak cough/throat clear;inconsistent response;could not clear subglottic material  -RD       Rosenbek's Scale  -- thin:;pudding/puree:;7-->Level 7;8-->Level 8  -RD       Residue  -- thin  liquids;pudding/puree;base of tongue;valleculae;pyriform sinuses;posterior pharyngeal wall;laryngeal vestibule;diffuse within pharynx;secondary to reduced base of tongue retraction;secondary to reduced posterior pharyngeal wall stripping;secondary to reduced laryngeal elevation;secondary to reduced hyolaryngeal excursion;other (see comments)   R wkns>L  -RD       Response to Residue  -- unable to clear residue;with spontaneous subsequent swallow;with compensatory maneuver (see comments)  -RD       Attempted Compensatory Maneuvers  -- bolus size;bolus presentation style;head turn to right;multiple swallows;throat clear after swallow  -RD       Response to Attempted Compensatory Maneuvers  -- did not prevent aspiration;did not reduce residue  -RD       FEES Summary  -- Severe oropharyngeal dysphagia. Pt w/ wet vocal quality this PM prior to PO trials (not present during bedside). Secretions present diffusely in pharynx and on VFs. Pt eventually aspirated secretions. Trials of thins and pudding given. Swallow initiation was significantly delayed. Aspiration occurred before/during/after w/ thins and penetration before w/ resulting aspiration during/after w/ pudding 2' impaired timing, reduced vestibular closure, and diffuse pharyngeal weakness. R sided weakness > L (essentially flaccid in R side of pharynx). Pt inconsistently sensed aspiration w/ weak throat clear/cough, but was unsuccessful to clear subglottic material. Moderate-severe pharyngeal residue w/ all consistencies 2' reduced base of tongue retraction, decr'd pharyngeal stripping, and decr'd elevation/excursion. Attempted multiple compensatory strategies which were unsuccessful to clear sublgottic material, reduce residue or prevent aspiration.   -RD          Clinical Impression    SLP Swallowing Diagnosis oral dysfunction;suspected pharyngeal dysfunction  -AV severe;oral dysfunction;pharyngeal dysfunction  -RD       Functional Impact risk of  aspiration/pneumonia  -AV risk of aspiration/pneumonia;risk of malnutrition;risk of dehydration  -RD       Rehab Potential/Prognosis, Swallowing adequate, monitor progress closely  -AV good, to achieve stated therapy goals  -RD       Criteria for Skilled Therapeutic Interventions Met demonstrates skilled criteria  -AV demonstrates skilled criteria  -RD          Recommendations    Therapy Frequency (Swallow)  -- 5 days per week  -RD       Predicted Duration Therapy Intervention (Days)  -- until discharge  -RD       SLP Diet Recommendation NPO  -AV NPO;temporary alternate methods of nutrition/hydration  -RD       Recommended Diagnostics FEES  -AV  --       Recommended Precautions and Strategies  -- other (see comments)   Oral care BID and PRN, aspiration precautions  -RD       SLP Rec. for Method of Medication Administration meds via alternate route  -AV meds via alternate route  -RD       Anticipated Dischage Disposition inpatient rehabilitation facility  -AV anticipate therapy at next level of care  -RD         User Key  (r) = Recorded By, (t) = Taken By, (c) = Cosigned By    Initials Name Effective Dates    AV Sita Mercado, MS CCC-SLP 04/03/18 -     RD Sary Florian, MS CCC-SLP 04/03/18 -         EDUCATION  The patient has been educated in the following areas:   Dysphagia (Swallowing Impairment) Oral Care/Hydration NPO rationale.    SLP Recommendation and Plan  SLP Swallowing Diagnosis: oral dysfunction, suspected pharyngeal dysfunction  SLP Diet Recommendation: NPO           Recommended Diagnostics: FEES  Criteria for Skilled Therapeutic Interventions Met: demonstrates skilled criteria  Anticipated Dischage Disposition: inpatient rehabilitation facility  Rehab Potential/Prognosis, Swallowing: adequate, monitor progress closely             Plan of Care Reviewed With: patient, family  Plan of Care Review  Plan of Care Reviewed With: patient, family  Progress:  (reeval bedside )          SLP GOALS     Row  Name 06/14/18 1345             Oral Nutrition/Hydration Goal 1 (SLP)    Oral Nutrition/Hydration Goal 1, SLP LTG: Pt will improve swallowing skills to begin to take some PO safely w/ 100% accuracy w/o cues.   -RD      Time Frame (Oral Nutrition/Hydration Goal 1, SLP) by discharge  -RD         Oral Nutrition/Hydration Goal 2 (SLP)    Oral Nutrition/Hydration Goal 2, SLP Pt will tolerate ice chip/thin trials post oral care w/ no overt s/s of aspiration w/ 70% accuracy w/o cues.   -RD      Time Frame (Oral Nutrition/Hydration Goal 2, SLP) short term goal (STG);by discharge  -RD         Labial Strengthening Goal 1 (SLP)    Activity (Labial Strengthening Goal 1, SLP) increase labial tone;increase labial sensation/afferent drive  -RD      Increase Labial Tone labial resistance exercises  -RD      Increase Labial Sensation/Afferent Drive swallow trials  -RD      [place]/Accuracy (Labial Strengthening Goal 1, SLP) with minimal cues (75-90% accuracy)  -RD      Time Frame (Labial Strengthening Goal 1, SLP) short term goal (STG);by discharge  -RD         Lingual Strengthening Goal 1 (SLP)    Activity (Lingual Strengthening Goal 1, SLP) increase lingual tone/sensation/control/coordination/movement;increase tongue back strength;increase buccal tension or tone  -RD      Increase Lingual Tone/Sensation/Control/Coordination/Movement lingual resistance exercises  -RD      Increase Buccal Tension or Tone swallow trials  -RD      Increase Tongue Back Strength lingual resistance exercises  -RD      Time Frame (Lingual Strengthening Goal 1, SLP) short term goal (STG);by discharge  -RD         Pharyngeal Strengthening Exercise Goal 1 (SLP)    Activity (Pharyngeal Strengthening Goal 1, SLP) increase timing;increase superior movement of the hyolaryngeal complex;increase anterior movement of the hyolaryngeal complex;increase closure at entrance to airway/closure of airway at glottis;increase tongue base retraction  -RD      Increase  Timing prepping - 3 second prep or suck swallow or 3-step swallow  -RD      Increase Superior Movement of the Hyolaryngeal Complex effortful pitch glide (falsetto + pharyngeal squeeze)  -RD      Increase Anterior Movement of the Hyolaryngeal Complex chin tuck against resistance (CTAR)  -RD      Increase Closure at Entrance to Airway/Closure of Airway at Glottis super-supraglottic swallow  -RD      Increase Tongue Base Retraction hard effortful swallow  -RD      [place]/Accuracy (Pharyngeal Strengthening Goal 1, SLP) with minimal cues (75-90% accuracy)  -RD      Time Frame (Pharyngeal Strengthening Goal 1, SLP) short term goal (STG);by discharge  -RD        User Key  (r) = Recorded By, (t) = Taken By, (c) = Cosigned By    Initials Name Provider Type    ARACELI Florian MS CCC-SLP Speech and Language Pathologist               Time Calculation:         Time Calculation- SLP     Row Name 06/17/18 1236             Time Calculation- SLP    SLP Start Time 1200  -AV      SLP Received On 06/17/18  -AV        User Key  (r) = Recorded By, (t) = Taken By, (c) = Cosigned By    Initials Name Provider Type    LIZ Mercado MS CCC-SLP Speech and Language Pathologist          Therapy Charges for Today     Code Description Service Date Service Provider Modifiers Qty    [id number] HC ST EVAL ORAL PHARYNG SWALLOW 3 6/17/2018 Sita Mercado MS CCC-SHOLA GN 1               MS AFIA Maynard  6/17/2018

## 2018-06-18 ENCOUNTER — ANCILLARY PROCEDURE (OUTPATIENT)
Dept: SPEECH THERAPY | Facility: HOSPITAL | Age: 73
End: 2018-06-18

## 2018-06-18 LAB
ALBUMIN SERPL-MCNC: 3.61 G/DL (ref 3.2–4.8)
ALP SERPL-CCNC: 86 U/L (ref 25–100)
ALT SERPL W P-5'-P-CCNC: 10 U/L (ref 7–40)
ANION GAP SERPL CALCULATED.3IONS-SCNC: 6 MMOL/L (ref 3–11)
AST SERPL-CCNC: 12 U/L (ref 0–33)
BILIRUB SERPL-MCNC: 0.7 MG/DL (ref 0.3–1.2)
BUN BLD-MCNC: 34 MG/DL (ref 9–23)
CALCIUM SPEC-SCNC: 9.2 MG/DL (ref 8.7–10.4)
CHLORIDE SERPL-SCNC: 106 MMOL/L (ref 99–109)
CHOLEST SERPL-MCNC: 112 MG/DL (ref 0–200)
CO2 SERPL-SCNC: 28 MMOL/L (ref 20–31)
CREAT BLD-MCNC: 0.84 MG/DL (ref 0.6–1.3)
CRP SERPL-MCNC: 7.48 MG/DL (ref 0–1)
DEPRECATED RDW RBC AUTO: 43.1 FL (ref 37–54)
ERYTHROCYTE [DISTWIDTH] IN BLOOD BY AUTOMATED COUNT: 13.4 % (ref 11.3–14.5)
GLUCOSE BLD-MCNC: 191 MG/DL (ref 70–100)
GLUCOSE BLDC GLUCOMTR-MCNC: 143 MG/DL (ref 70–130)
GLUCOSE BLDC GLUCOMTR-MCNC: 158 MG/DL (ref 70–130)
GLUCOSE BLDC GLUCOMTR-MCNC: 158 MG/DL (ref 70–130)
HCT VFR BLD AUTO: 42.7 % (ref 34.5–44)
HGB BLD-MCNC: 13.2 G/DL (ref 11.5–15.5)
MAGNESIUM SERPL-MCNC: 2.2 MG/DL (ref 1.3–2.7)
MCH RBC QN AUTO: 27 PG (ref 27–31)
MCHC RBC AUTO-ENTMCNC: 30.9 G/DL (ref 32–36)
MCV RBC AUTO: 87.5 FL (ref 80–99)
PHOSPHATE SERPL-MCNC: 2.9 MG/DL (ref 2.4–5.1)
PLATELET # BLD AUTO: 231 10*3/MM3 (ref 150–450)
PMV BLD AUTO: 9.4 FL (ref 6–12)
POTASSIUM BLD-SCNC: 4 MMOL/L (ref 3.5–5.5)
PREALB SERPL-MCNC: 11.6 MG/DL (ref 10–40)
PROT SERPL-MCNC: 6.3 G/DL (ref 5.7–8.2)
RBC # BLD AUTO: 4.88 10*6/MM3 (ref 3.89–5.14)
SODIUM BLD-SCNC: 140 MMOL/L (ref 132–146)
TRIGL SERPL-MCNC: 59 MG/DL (ref 0–150)
WBC NRBC COR # BLD: 10.66 10*3/MM3 (ref 3.5–10.8)

## 2018-06-18 PROCEDURE — 99232 SBSQ HOSP IP/OBS MODERATE 35: CPT | Performed by: RADIOLOGY

## 2018-06-18 PROCEDURE — 83735 ASSAY OF MAGNESIUM: CPT

## 2018-06-18 PROCEDURE — 82962 GLUCOSE BLOOD TEST: CPT

## 2018-06-18 PROCEDURE — 99232 SBSQ HOSP IP/OBS MODERATE 35: CPT | Performed by: INTERNAL MEDICINE

## 2018-06-18 PROCEDURE — 25010000002 HYDRALAZINE PER 20 MG: Performed by: NURSE PRACTITIONER

## 2018-06-18 PROCEDURE — 84134 ASSAY OF PREALBUMIN: CPT

## 2018-06-18 PROCEDURE — 82465 ASSAY BLD/SERUM CHOLESTEROL: CPT

## 2018-06-18 PROCEDURE — 80053 COMPREHEN METABOLIC PANEL: CPT

## 2018-06-18 PROCEDURE — 85027 COMPLETE CBC AUTOMATED: CPT | Performed by: INTERNAL MEDICINE

## 2018-06-18 PROCEDURE — 84100 ASSAY OF PHOSPHORUS: CPT

## 2018-06-18 PROCEDURE — 97110 THERAPEUTIC EXERCISES: CPT

## 2018-06-18 PROCEDURE — 63710000001 INSULIN LISPRO (HUMAN) PER 5 UNITS: Performed by: INTERNAL MEDICINE

## 2018-06-18 PROCEDURE — 92612 ENDOSCOPY SWALLOW (FEES) VID: CPT

## 2018-06-18 PROCEDURE — 84478 ASSAY OF TRIGLYCERIDES: CPT

## 2018-06-18 PROCEDURE — 97530 THERAPEUTIC ACTIVITIES: CPT

## 2018-06-18 PROCEDURE — 86140 C-REACTIVE PROTEIN: CPT

## 2018-06-18 RX ORDER — DEXTROSE MONOHYDRATE 25 G/50ML
25 INJECTION, SOLUTION INTRAVENOUS
Status: DISCONTINUED | OUTPATIENT
Start: 2018-06-18 | End: 2018-06-20 | Stop reason: HOSPADM

## 2018-06-18 RX ORDER — AMLODIPINE BESYLATE 10 MG/1
10 TABLET ORAL
Status: DISCONTINUED | OUTPATIENT
Start: 2018-06-19 | End: 2018-06-20 | Stop reason: HOSPADM

## 2018-06-18 RX ORDER — NICOTINE POLACRILEX 4 MG
15 LOZENGE BUCCAL
Status: DISCONTINUED | OUTPATIENT
Start: 2018-06-18 | End: 2018-06-20 | Stop reason: HOSPADM

## 2018-06-18 RX ADMIN — CLOPIDOGREL BISULFATE 75 MG: 75 TABLET ORAL at 09:47

## 2018-06-18 RX ADMIN — INSULIN LISPRO 2 UNITS: 100 INJECTION, SOLUTION INTRAVENOUS; SUBCUTANEOUS at 12:20

## 2018-06-18 RX ADMIN — AMLODIPINE BESYLATE 7.5 MG: 2.5 TABLET ORAL at 09:47

## 2018-06-18 RX ADMIN — ASPIRIN 81 MG CHEWABLE TABLET 81 MG: 81 TABLET CHEWABLE at 09:47

## 2018-06-18 RX ADMIN — Medication 1 PACKET: at 09:47

## 2018-06-18 RX ADMIN — ATORVASTATIN CALCIUM 80 MG: 40 TABLET, FILM COATED ORAL at 21:26

## 2018-06-18 NOTE — PROGRESS NOTES
NAME: LIDA MAZARIEGOS  : 1945  PCP: Milvia Frye MD  ADMITTING PHYSICIAN: Светлана De La Torre DO    DATE OF ADMISSION:  2018  DATE OF SERVICE: 2018    HOSPITAL DAY:  5 days    HISTORY OF PRESENT ILLNESS:  72 y.o. female well known to the Neurointerventional service, having been treated on multiple occasions for advanced intracranial atherosclerotic disease (ICAD) and prior basilar occlusion X 2.  She's had angioplasty/stent placement involving her intracranial right vertebral artery greater than 5 years ago ([employer]).  She did very well for several years, but presented to [facility] in mid-May 2018 for recurrent ICAD and basilar thrombosis.  She was transferred to [facility], and underwent emergent neurointervention with IA tPA/thrombectomy and angioplasty of critical stenosis of right VB junction on 18.  This represented progression of disease, as her prior stents were widely patent.  She was restarted on Plavix/ASA regimen, and made an excellent recovery.       She re-presented to [facility] emergency department on 2018 with a 2 day history of worsening speech difficulties and worsening left lower extremity weakness. Given her sub--acute presentation, with symptom onset occurring 2 days prior, she was deemed not a candidate for IV TPA therapy.  She did undergo MR angiography of the head, which demonstrated reocclusion of her basilar artery.  Given concern for further progression of symptoms and worsening brainstem strokes, she was taken for emergent neuro intervention.    REVIEW OF IMAGING:  MRI/MRA of the head dated 6/15/2018 at [facility] were reviewed.  These studies demonstrate artifactual loss of flow-related signal at the right vertebrobasilar junction, at the site of recent stent placement.  The basilar artery is of diminished caliber, but there has been restoration of flow-related signal following intervention,  compared with MR angiogram dated 6/13/2018.  The MRI of the head demonstrates punctate, acute strokes involving the right posterior lateral aspects of the medulla, as well as tiny infarcts within the right prashant and left cerebellum.    LABS:  Lab Results   Component Value Date    WBC 10.66 06/18/2018    HGB 13.2 06/18/2018    HCT 42.7 06/18/2018    MCV 87.5 06/18/2018     06/18/2018     Lab Results   Component Value Date    GLUCOSE 191 (H) 06/18/2018    CALCIUM 9.2 06/18/2018     06/18/2018    K 4.0 06/18/2018    CO2 28.0 06/18/2018     06/18/2018    BUN 34 (H) 06/18/2018    CREATININE 0.84 06/18/2018    EGFRIFAFRI 77 06/17/2018    BCR 28.7 (H) 06/17/2018    ANIONGAP 6.0 06/18/2018     Lab Results   Component Value Date    HGBA1C 6.10 (H) 06/14/2018     Lab Results   Component Value Date    CHOL 112 06/18/2018    TRIG 59 06/18/2018    HDL 45 06/14/2018     06/14/2018       CURRENT MEDS:  Current Facility-Administered Medications   Medication Dose Route Frequency Provider Last Rate Last Dose   • [START ON 6/19/2018] amLODIPine (NORVASC) tablet 10 mg  10 mg Oral Q24H Raul Melton MD       • aspirin chewable tablet 81 mg  81 mg Oral Daily Gilberto Sears MD   81 mg at 06/18/18 0947   • atorvastatin (LIPITOR) tablet 80 mg  80 mg Oral Nightly Gilberto Sears MD   80 mg at 06/17/18 2033   • clopidogrel (PLAVIX) tablet 75 mg  75 mg Oral Daily Gilberto Sears MD   75 mg at 06/18/18 0947   • dextrose (D50W) solution 25 g  25 g Intravenous Q15 Min PRN Raul Melton MD       • dextrose (GLUTOSE) oral gel 15 g  15 g Oral Q15 Min PRN Raul Melton MD       • glucagon (human recombinant) (GLUCAGEN DIAGNOSTIC) injection 1 mg  1 mg Subcutaneous PRN Raul Melton MD       • hydrALAZINE (APRESOLINE) injection 20 mg  20 mg Intravenous Q6H PRN Clara Tenorio APRN   20 mg at 06/17/18 1815   • HYDROcodone-acetaminophen (NORCO) 5-325 MG per tablet 1 tablet  1 tablet Oral  Q4H PRN Gilberto Sears MD   1 tablet at 06/14/18 2025   • insulin lispro (humaLOG) injection 0-7 Units  0-7 Units Subcutaneous 4x Daily With Meals & Nightly Raul Melton MD       • niCARdipine (CARDENE-IV) 40 mg/200 mL (0.2 mg/mL) in 0.9% NaCl infusion  5-15 mg/hr Intravenous Titrated Светлана V. Case, DO   Stopped at 06/18/18 0203   • ondansetron (ZOFRAN) injection 4 mg  4 mg Intravenous Q6H PRN Gilberto Sears MD   4 mg at 06/14/18 0924   • phenylephrine (BALA-SYNEPHRINE) 50 mg in sodium chloride 0.9 % 250 mL (0.2 mg/mL) infusion  0.5-3 mcg/kg/min Intravenous Titrated Gilberto Sears MD       • potassium chloride (KLOR-CON) packet 40 mEq  40 mEq Oral PRN KENIA Britt   40 mEq at 06/15/18 1205   • PRO-STAT 1 packet  1 packet Nasogastric Daily Danette Cuevas, RD   1 packet at 06/18/18 0947   • sodium chloride 0.9 % flush 1-10 mL  1-10 mL Intravenous PRN Gilberto Sears MD           PHYSICAL EXAM:  Vitals:    06/18/18 1000   BP: 135/81   Pulse: 71   Resp: 18   Temp:    SpO2: 95%      Her dysarthria has significantly improved since I saw her last.  She has antigravity strength on the right side.  She does still have an element of diplopia, but she subjectively says this appears to be improving.    ASSESSMENT/PLAN:  Ms. Harmon is a 72 y.o. female well known to the neuro interventional service, having been treated on multiple occasions for advanced intracranial atherosclerotic disease, predominantly involving the vertebrobasilar junction.  She has presented on multiple occasions over the past 7 years or so with brainstem/posterior fossa strokes and basilar occlusions.  She has always made a significant recovery following intervention previously.     She presented to the emergency department on 6/13/2018 with a 2 day history of progressive weakness and slurred speech.  Given her sub--acute presentation, she was not a candidate for IV TPA therapy, but MR angiography demonstrated reocclusion of her  basilar artery.  Given the uniformly poor prognosis, she was taken for emergent neuro intervention with successful restoration of flow within the basilar artery with intra-arterial TPA/thrombectomy, as well as placement of a 2.25 mm Xience drug-eluting stent at a critical re-stenosis involving the right vertebrobasilar junction (recurrent stenosis, site of prior angioplasty).     Unfortunately, Ms. Harmon did suffer new ischemic strokes related to her most recent event.  However, MRI demonstrates these are tiny/punctate infarcts involving her right posterior lateral medulla, prashant, and left cerebellum.  She has shown improvement since I saw her several days ago, and I'm optimistic that she can make a meaningful recovery from this.       She will need to continue dual antiplatelet and statin therapy.   She has yet to pass her dysphasia screening, and is receiving tube feedings and by mouth meds via feeding tube.  However, given her improvement in speech, my optimistic that she will pass dysphasia screening the next couple of days or so, if not we will consider PEG tube placement.

## 2018-06-18 NOTE — PROGRESS NOTES
Adult Nutrition  Assessment/PES    Patient Name:  Jessy Harmon  YOB: 1945  MRN: [id number]  Admit Date:  6/13/2018    Assessment Date:  6/18/2018    Comments:  Pt continues to tolerate EN support of Fibersource HN at 70 ml/hr w/ water flushes at 10 ml/hr. Pt failed FEES evaluation today -will follow for possible transition to po diet or PEG.          Reason for Assessment     Row Name 06/18/18 3307          Reason for Assessment    Reason For Assessment TF/PN;per organizational policy;identified at risk by screening criteria;follow-up protocol   MDR, EN support f/u 30 mins     Diagnosis neurologic conditions;cardiac disease;renal disease   AADM w/ CVA; basilar artery occlusion s/p tPA , thrombectomy, stentl, oropharyngeal dysphagia, HTN Hx: HLP, HTN, CKD, CXVA's x2     Identified At Risk by Screening Criteria MST SCORE 2+;tube feeding or parenteral nutrition             Nutrition/Diet History     Row Name 06/18/18 8993          Nutrition/Diet History    Factors Affecting Nutritional Intake --   RN reports pt tolerating TF at goal; FEES re-eval  today; may need PEG; pt reports bloating but no pain from TF; sates she wants to do swallow exercises but does not have them.               Labs/Tests/Procedures/Meds     Row Name 06/18/18 9087          Labs/Procedures/Meds    Lab Results Reviewed reviewed, pertinent     Lab Results Comments Elev CRP and depressed pre-albumin noted; elev glucoses addressed in MDR        Diagnostic Tests/Procedures    Diagnostic Test/Procedure Reviewed reviewed, pertinent     Diagnostic Test/Procedures Comments FEES 6-18 moderate oral and severe pharyngeal dysphagia - Recommendations to remain NPO w/ alt means on nutrition        Medications    Pertinent Medications Reviewed reviewed, pertinent     Pertinent Medications Comments jeremiah slade'd; SC insulin ordered             Physical Findings     Row Name 06/18/18 1902          Physical Findings    Overall Physical Appearance  overweight     Tubes nasoduodenal tube             Estimated/Assessed Needs     Row Name 06/18/18 1728          Calculation Measurements    Weight Used For Calculations 71.2 kg (156 lb 15.5 oz)        KCAL/KG    20 Kcal/Kg (kcal) 1424     25 Kcal/Kg (kcal) 1780     30 Kcal/Kg (kcal) 2136        Green-St. Jeor Equation    RMR (Green-St. Jeor Equation) 1212.88 x 1.2 = 1455        Fluid Requirements    20-30 ml/kg 8730-2765 ml/d               Nutrition Prescription Ordered     Row Name 06/18/18 1727          Nutrition Prescription PO    Current PO Diet NPO        Nutrition Prescription EN    Enteral Route ND     Product Fibersource HN     Modulars Liquid Protein (15 gm/30 mL)     Liquid Protein (15 gm/30 mL) 30 mL/1 packet     Protein Liquid Frequency Daily     TF Delivery Method Continuous     Continuous TF Goal Rate (mL/hr) 70 mL/hr     Continuous TF Current Rate (mL/hr) 70 mL/hr     Continuous TF Goal Volume (mL) 1500 mL     Continuous TF Current Volume (mL) 1680 mL     Water flush (mL)  10 mL     Water Flush Frequency Per hour             Evaluation of Received Nutrient/Fluid Intake     Row Name 06/18/18 1728          Calculation Measurements    Weight Used For Calculations 71.2 kg (156 lb 15.5 oz)        Nutrient/Fluid Evaluation    Number of Days Evaluated 3 days        Calories Evaluation    Enteral Calories (kcal) 1199     Other Calories (kcal) 100     Total Calories (kcal) 1299     % of Kcal Needs 74        Protein Evaluation    Enteral Protein (gm) 53     Other Protein (gm) 15     Total Protein (gm) 68     % of Protein Needs 80        Intake Assessment    Energy/Calorie Requirement Assessment not meeting needs     Protein Requirement Assessment not meeting needs     Tolerance tolerating        Fluid Intake Evaluation    Enteral (Free Water) Fluid (mL) 809     Free Water Flush Fluid (mL) 261     Total Free Water Intake (mL) 1070 mL        EN Evaluation    Number of Days EN Intake Evaluated 3 days     EN  Average Volume Delivered (mL/day) 999 mL/day     % Goal Volume  67 %   slightly skewed includes day of feeding initiation             Evaluation of Prescribed Nutrient/Fluid Intake     Row Name 06/18/18 1728          Calculation Measurements    Weight Used For Calculations 71.2 kg (156 lb 15.5 oz)           Problem/Interventions:        Problem 1     Row Name 06/18/18 1731          Nutrition Diagnoses Problem 1    Problem 1 Inadequate Intake/Infusion     Inadequate Intake Type Oral     Etiology (related to) Functional Diagnosis;Medical Diagnosis     Neurological CVA     Functional Diagnosis Dysphagia     Signs/Symptoms (evidenced by) SLP/Swallow eval;NPO     Swallow eval status Done     Type of SLP Evaluation Other (comment)   FEES- moderate oral  and severe pharyngeal dysphagia 6/18 FEES                     Intervention Goal     Row Name 06/18/18 1732          Intervention Goal    General Nutrition support treatment;Meet nutritional needs for age/condition     TF/PN Deliver (%) goal;Tolerate TF at goal     Deliver % of Goal 100 %             Nutrition Intervention     Row Name 06/18/18 1732          Nutrition Intervention    RD/Tech Action Follow Tx progress;Care plan reviewd             Nutrition Prescription     Row Name 06/18/18 1732 06/15/18 1212       Nutrition Prescription EN    Enteral Prescription Continue same protocol Enteral begin/change;Enteral to supply    Enteral Route  -- ND    Product  -- Fibersource HN    Modulars  -- Liquid Protein (15 gm/30 mL)    Liquid Protein (15 gm/30 mL)  -- 30 mL/1 packet    Protein Liquid Frequency  -- Daily    TF Delivery Method  -- Continuous    Continuous TF Goal Rate (mL/hr)  -- 70 mL/hr    Continuous TF Starting Rate (mL/hr)  -- 20 mL/hr    Continuous TF Goal Volume (mL)  -- 1500 mL    Continuous TF Starting Volume (mL)  -- 480 mL    Water flush (mL)   -- 0 mL   w/ medication administration    New EN Prescription Ordered?  -- Yes       EN to Supply    Kcal/Day  --  1900 Kcal/Day    Kcal/Kg  -- 27 Kcal/Kg    Kcal/Kg Weight Method  -- Actual weight    Protein (gm/day)  -- 96 gm/day    Meet Estimated Protein Need (%)  -- 113 %   1.3 gm/kg    TF Free H2O (mL)  -- 1215 mL    Total Free H2O (mL/day)  -- 1215 mL/day    Fiber Per Day (gm/day)  -- 22 gm/day       Other Orders    Labs  -- NUT Panel;CRP;PreAlb;Mg++;Phos    Labs Ordered?  -- Yes    Other  -- Will check Mg++, phos w/ initiation of feeding ; K+ replacement ordered            Education/Evaluation     Row Name 06/18/18 6563          Monitor/Evaluation    Monitor Per protocol;I&O;Pertinent labs;TF delivery/tolerance;Symptoms         Electronically signed by:  Danette Cuevas RD  06/18/18 5:33 PM

## 2018-06-18 NOTE — PLAN OF CARE
Problem: Patient Care Overview  Goal: Plan of Care Review  Outcome: Ongoing (interventions implemented as appropriate)  Pt's BP has been running high and requiring Cardene gtt for BP control. Weaning Cardene gtt as tolerated. Will continue to monitor.     Problem: Skin Injury Risk (Adult)  Goal: Identify Related Risk Factors and Signs and Symptoms  Outcome: Ongoing (interventions implemented as appropriate)      Problem: Stroke (Ischemic) (Adult)  Goal: Signs and Symptoms of Listed Potential Problems Will be Absent, Minimized or Managed (Stroke)  Outcome: Ongoing (interventions implemented as appropriate)

## 2018-06-18 NOTE — PLAN OF CARE
Problem: Patient Care Overview  Goal: Plan of Care Review  Outcome: Ongoing (interventions implemented as appropriate)   06/18/18 1000   Coping/Psychosocial   Plan of Care Reviewed With patient   OTHER   Outcome Summary Pt conts to be Max A x2 to perform bent/pivot t/f from bed to chair, though progressed from Max A to moments of CGA for static sitting balance. Pt conts to be limited d/t R sided weakness. Recommend cont skilled IPOT POC.

## 2018-06-18 NOTE — PLAN OF CARE
Problem: Patient Care Overview  Goal: Plan of Care Review  Outcome: Ongoing (interventions implemented as appropriate)   06/18/18 1945   Plan of Care Review   Progress no change   Coping/Psychosocial   Plan of Care Reviewed With patient   SLP FEES re-evaluation completed. Will address moderate oral dysphagia and severe pharyngeal dysphagia during treatment. Pt not able to clear secretions from R side of pharynx (aspirated secretions t/o exam). Pt still aspirating all consistencies during/after the swallow. R side significantly weaker than L side in pharynx. Still significant diffuse pharyngeal residue (> in R pyriform). Pt reports turning head R to help her swallow, attempted this along w/ multiple other compensations, however these were unsuccessful to eliminate aspiration. RECS: Continue NPO, meds alt route, general aspiration precautions, Oral care BID and PRN. Please see note for further details and recommendations.

## 2018-06-18 NOTE — PROGRESS NOTES
Intensive Care Follow-up      LOS: 5 days     Ms. Jessy Harmon, 72 y.o. female is followed for: Cerebrovascular accident (CVA)     Subjective - Interval History     Remains weak  Oxygenating well on room air  Continues to receive tube feeding via feeding tube.  Tolerating well  blood sugars remain elevated    The patient's relevant past medical, surgical and social history were reviewed and updated in Epic as appropriate.     Objective     Infusions:    niCARdipine 5-15 mg/hr Last Rate: Stopped (06/18/18 0203)   phenylephrine (BALA-SYNEPHRINE) 50 mg/250 (0.2 mg/mL) infusion 0.5-3 mcg/kg/min      Medications:    amLODIPine 7.5 mg Oral Q24H   aspirin 81 mg Oral Daily   atorvastatin 80 mg Oral Nightly   clopidogrel 75 mg Oral Daily   insulin lispro 0-7 Units Subcutaneous 4x Daily With Meals & Nightly   PRO-STAT 1 packet Nasogastric Daily     Intake/Output       06/17/18 0700 - 06/18/18 0659 06/18/18 0700 - 06/19/18 0659    Intake (ml) 3490.9 0    Output (ml) 1090 400    Net (ml) 2400.9 -400        Vital Sign Min/Max for last 24 hours  Temp  Min: 97.5 °F (36.4 °C)  Max: 98.4 °F (36.9 °C)   BP  Min: 106/69  Max: 150/75   Pulse  Min: 71  Max: 88   Resp  Min: 16  Max: 20   SpO2  Min: 92 %  Max: 98 %   Flow (L/min)  Min: 2  Max: 2        Physical Exam:   GENERAL: Awake, no distress   HEENT:  feeding tube in good position.  No adenopathy   LUNGS:  decreased breath sounds without wheezes   HEART:  regular rate and rhythm without murmurs   ABDOMEN:  soft, nontender.  Bowel sounds present   EXTREMITIES:  trace edema.  Palpable pulses.  No cyanosis   NEURO/PSYCH:  awake and alert.  Diffusely weak.  Gait not attempted      Results from last 7 days  Lab Units 06/18/18  0445 06/17/18  0416 06/15/18  0414   WBC 10*3/mm3 10.66 13.21* 10.92*   HEMOGLOBIN g/dL 13.2 15.6* 11.4*   PLATELETS 10*3/mm3 231 288 233       Results from last 7 days  Lab Units 06/18/18  0445 06/17/18  0416 06/15/18  1938 06/15/18  1308 06/15/18  0414   SODIUM  mmol/L 140 136  --   --  140   POTASSIUM mmol/L 4.0 4.0 4.8  --  3.3*   CO2 mmol/L 28.0 27.0  --   --  20.0   BUN mg/dL 34* 25*  --   --  18   CREATININE mg/dL 0.84 0.87  --   --  0.60   MAGNESIUM mg/dL 2.2  --   --  2.1  --    PHOSPHORUS mg/dL 2.9  --   --  2.6  --    GLUCOSE mg/dL 191* 189*  --   --  108*     Estimated Creatinine Clearance: 58.2 mL/min (by C-G formula based on SCr of 0.84 mg/dL).      Results from last 7 days  Lab Units 06/14/18  0512   HEMOGLOBIN A1C % 6.10*           No results found for: LACTATE       Images: Most recent MRI of the brain revealed no acute intracranial abnormalities.  Evidence of old strokes noted.    I reviewed the patient's results and images.     Impression      Hospital Problem List     * (Principal)Cerebrovascular accident (CVA), basilar artery occlusion, s/p TPA/thrombectomy/stenting    Hyperlipidemia    Hypertension    Chronic kidney disease    Stroke- History    Overview Signed 5/22/2018  1:40 PM by KENIA Wright in September 2011 and Dec 2011                    Plan        Dysphagia reevaluation.  I suspect she will need a feeding tube.  If so, we'll discuss PEG placement  We'll require inpatient rehabilitation  Better blood sugar control  Better blood pressure control  Stable for transfer to the floor     Plan of care and goals reviewed with mulitdisciplinary team at daily rounds   I discussed the patient's findings and my recommendations with patient and nursing staff       JOSEFINA Melton MD  Pulmonary and Critical Care Medicine

## 2018-06-18 NOTE — THERAPY TREATMENT NOTE
Acute Care - Physical Therapy Treatment Note  [facility]     Patient Name: Jessy Harmon  : 1945  MRN: [id number]  Today's Date: 2018  Onset of Illness/Injury or Date of Surgery: 18  Date of Referral to PT: 18  Referring Physician: MD Renu    Admit Date: 2018    Visit Dx:    ICD-10-CM ICD-9-CM   1. Cerebrovascular accident (CVA), unspecified mechanism I63.9 434.91   2. Vertebrobasilar artery stenosis I65.1 433.30    I65.09    3. Dysarthria R47.1 784.51   4. Weakness of left lower extremity R29.898 729.89   5. Impaired functional mobility, balance, gait, and endurance Z74.09 V49.89   6. Oropharyngeal dysphagia R13.12 787.22   7. Impaired mobility and ADLs Z74.09 799.89     Patient Active Problem List   Diagnosis   • CVA (cerebral vascular accident)   • Hyperlipidemia   • Hypertension   • Chronic kidney disease   • Stroke- History   • Atherosclerotic cerebrovascular disease   • Vertebrobasilar artery stenosis   • Cerebrovascular accident (CVA), basilar artery occlusion, s/p TPA/thrombectomy/stenting       Therapy Treatment          Rehabilitation Treatment Summary     Row Name 18 1401 18 0837          Treatment Time/Intention    Discipline (P)  physical therapist  -JIL occupational therapist  -CL     Document Type (P)  therapy note (daily note)  -JIL therapy note (daily note)  -CL     Subjective Information (P)  complains of;weakness;fatigue;pain  -JIL complains of;weakness;pain  -CL     Mode of Treatment (P)  physical therapy  -JIL  --     Patient Effort (P)  good  -JIL  --     Existing Precautions/Restrictions (P)  fall  -JIL fall   R sided weakness, NG  -CL     Treatment Considerations/Comments (P)  Eye patch on L to address double vision symptoms.  -JIL  --     Recorded by [JIL] Reid Mancilla, PT Student 18 1447 [CL] Radha Becerril OT 18 0950     Row Name 18 1401 18 0837          Vital Signs    Pre Systolic BP Rehab (P)  137  -  -CL     Pre  Treatment Diastolic BP (P)  76  -JIL 75  -CL     Post Systolic BP Rehab (P)  --   Deferred due to pt c/o of pain with BP cuff.  -  -CL     Post Treatment Diastolic BP  -- 67  -CL     Pretreatment Heart Rate (beats/min) (P)  80  -JIL 77  -CL     Posttreatment Heart Rate (beats/min) (P)  79  -JIL 75  -CL     Pre SpO2 (%) (P)  94  -JIL 91  -CL     O2 Delivery Pre Treatment (P)  room air  -JIL supplemental O2  -CL     Post SpO2 (%) (P)  97  -JIL 91  -CL     O2 Delivery Post Treatment (P)  room air  -JIL supplemental O2  -CL     Pre Patient Position (P)  Sitting  -JIL Supine  -CL     Intra Patient Position (P)  Standing  -JIL Standing  -CL     Post Patient Position (P)  Sitting  -JIL Sitting  -CL     Recorded by [JIL] Reid Mancilla, PT Student 06/18/18 1445 [CL] Radha Becerril OT 06/18/18 0950     Row Name 06/18/18 1401 06/18/18 0837          Cognitive Assessment/Intervention- PT/OT    Affect/Mental Status (Cognitive) (P)  flat/blunted affect  -JIL low arousal/lethargic  -CL     Orientation Status (Cognition) (P)  oriented to;person  -JIL oriented to;person  -CL     Follows Commands (Cognition) (P)  follows one step commands;50-74% accuracy;delayed response/completion;increased processing time needed;verbal cues/prompting required;physical/tactile prompts required  -JIL follows one step commands;50-74% accuracy;repetition of directions required;verbal cues/prompting required;increased processing time needed  -CL     Cognitive Function (Cognitive) (P)  safety deficit  -JIL safety deficit  -CL     Safety Deficit (Cognitive) (P)  moderate deficit;ability to follow commands;insight into deficits/self awareness;safety precautions awareness;safety precautions follow-through/compliance  -JIL moderate deficit;awareness of need for assistance;safety precautions awareness  -CL     Personal Safety Interventions (P)  fall prevention program maintained;gait belt;nonskid shoes/slippers when out of bed  -JIL fall prevention program maintained;gait  belt;nonskid shoes/slippers when out of bed  -CL     Recorded by [JIL] Reid Mancilla, PT Student 06/18/18 1445 [CL] Radha Becerril, OT 06/18/18 0950     Row Name 06/18/18 1401             Safety Issues, Functional Mobility    Safety Issues Affecting Function (Mobility) (P)  ability to follow commands;insight into deficits/self awareness;safety precaution awareness;safety precautions follow-through/compliance;sequencing abilities  -JIL      Impairments Affecting Function (Mobility) (P)  balance;cognition;coordination;endurance/activity tolerance;pain;strength  -JIL      Comment, Safety Issues/Impairments (Mobility) (P)  Decreased LUE/LLE gross motor contorol; leans to R side with unsupported sitting and req re-orientation to midline  -JIL      Recorded by [JIL] Reid Mancilla, PT Student 06/18/18 1445      Row Name 06/18/18 1401 06/18/18 0837          Bed Mobility Assessment/Treatment    Bed Mobility Assessment/Treatment  -- supine-sit  -CL     Supine-Sit [place] (Bed Mobility)  -- maximum assist (25% patient effort);2 person assist;verbal cues  -CL     Assistive Device (Bed Mobility)  -- draw sheet;head of bed elevated  -CL     Comment (Bed Mobility) (P)  UIC upon arrival  -JIL Upon sitting EOB pt w/ significant R lateral and posterior LOB.   -CL     Recorded by [JIL] Reid Mancilla, PT Student 06/18/18 1445 [CL] Radha Becerril, OT 06/18/18 0950     Row Name 06/18/18 1401 06/18/18 0837          Transfer Assessment/Treatment    Transfer Assessment/Treatment  -- bed-chair transfer;stand-sit transfer;sit-stand transfer  -CL     Comment (Transfers) (P)  Performed STS x 3 to address functional endurance. Pt req VC/TC for orientation to midline; tendancy to lean toward R. Pt difficulty assuming erect posture, reached ~75% to upright standing; B hip and knee extension limited, req cuing to tuck pelvis. Pt increased length of time in standing position to >15 seconds prior to req rest.  -JIL Pt performed bent-pivot from bed to chair w/  R knee blocked and BUE support.   -CL     Bed-Chair [place] (Transfers)  -- maximum assist (25% patient effort);2 person assist;verbal cues  -CL     Sit-Stand [place] (Transfers) (P)  maximum assist (25% patient effort);2 person assist;verbal cues;nonverbal cues (demo/gesture)  -JIL maximum assist (25% patient effort);2 person assist;verbal cues  -CL     Stand-Sit [place] (Transfers) (P)  maximum assist (25% patient effort);2 person assist;verbal cues  -JIL maximum assist (25% patient effort);2 person assist;verbal cues  -CL     Recorded by [JIL] Reid Mancilla, PT Student 06/18/18 1445 [CL] Radha Becerril OT 06/18/18 0950     Row Name 06/18/18 1401             Sit-Stand Transfer    Assistive Device (Sit-Stand Transfers) (P)  other (see comments)   BUE support  -JIL      Recorded by [JIL] Reid Mancilla, PT Student 06/18/18 1445      Row Name 06/18/18 1401             Stand-Sit Transfer    Assistive Device (Stand-Sit Transfers) (P)  other (see comments)   BUE support  -JIL      Recorded by [JIL] Reid Mancilla, PT Student 06/18/18 1445      Row Name 06/18/18 1401             Gait/Stairs Assessment/Training    [place] Level (Gait) (P)  not tested  -JIL      Comment (Gait/Stairs) (P)  Deferred due to pt functional status at this time.  -JIL      Recorded by [JIL] Reid Mancilla, PT Student 06/18/18 1445      Row Name 06/18/18 1401             General ROM    GENERAL ROM COMMENTS (P)  RLE grossly 2+/5, LLE grossly 3/5  -JIL      Recorded by [JIL] Reid Mancilla, PT Student 06/18/18 1445      Row Name 06/18/18 1401 06/18/18 0837          Therapeutic Exercise    Therapeutic Exercise  -- seated, upper extremities  -CL     03247 - PT Therapeutic Exercise Minutes (P)  10  -JIL  --     80439 - PT Therapeutic Activity Minutes (P)  18  -JIL  --     37071 - OT Therapeutic Exercise Minutes  -- 8  -CL     60115 - OT Therapeutic Activity Minutes  -- 15  -CL     Recorded by [JIL] Reid Mancilla, PT Student 06/18/18 1445 [CL] Radha  Jone, RON 06/18/18 0950     Row Name 06/18/18 0837             Upper Extremity Seated Therapeutic Exercise    Performed, Seated Upper Extremity (Therapeutic Exercise) shoulder flexion/extension;shoulder external/internal rotation;elbow flexion/extension;forearm supination/pronation;wrist flexion/extension;digit flexion/extension  -CL      Exercise Type, Seated Upper Extremity (Therapeutic Exercise) AAROM (active assistive range of motion)  -CL      Sets/Reps Detail, Seated Upper Extremity (Therapeutic Exercise) 1/10  -CL      Comment, Seated Upper Extremity (Therapeutic Exercise) RUE  -CL      Recorded by [CL] Radha Becerril, RON 06/18/18 0950      Row Name 06/18/18 1401             Lower Extremity Seated Therapeutic Exercise    Performed, Seated Lower Extremity (Therapeutic Exercise) (P)  hip flexion/extension;hip abduction/adduction;LAQ (long arc quad), knee extension;ankle dorsiflexion/plantarflexion  -JIL      Exercise Type, Seated Lower Extremity (Therapeutic Exercise) (P)  AAROM (active assistive range of motion)  -JIL      Sets/Reps Detail, Seated Lower Extremity (Therapeutic Exercise) (P)  BLE 10x each  -JIL      Recorded by [JIL] Reid Mancilla, PT Student 06/18/18 1445      Row Name 06/18/18 0837             Balance    Balance static sitting balance;static standing balance  -CL      Recorded by [CL] Radha Becerril OT 06/18/18 1000      Row Name 06/18/18 1401 06/18/18 0837          Static Sitting Balance    Level of [place] (Unsupported Sitting, Static Balance) (P)  maximal assist, 25 to 49% patient effort  -JIL maximal assist, 25 to 49% patient effort   Progressed to moments of CGA  -CL     Sitting Position (Unsupported Sitting, Static Balance) (P)  sitting in chair  -JIL sitting on edge of bed  -CL     Time Able to Maintain Position (Unsupported Sitting, Static Balance) (P)  more than 5 minutes  -JIL  --     Comment (Unsupported Sitting, Static Balance) (P)  L lateral lean; pt able to correct with VC/TC to re-orient  to midline with a focal point. Worked on lateral and anterior/posterior WS where pt re-adjusts to midline from a position outside LILLIE. Assisted with WB through RUE. Pt max A to correct with periods of mod A with sustained focus on task.  -JIL L lateral lean w/ LUE weight bearing, BLE support, RUE support, R/L WS, A/P WS  -CL     Recorded by [JIL] Reid Mancilla, PT Student 06/18/18 1445 [CL] Radha Becerril, OT 06/18/18 1000     Row Name 06/18/18 1401 06/18/18 0837          Static Standing Balance    Level of [place] (Supported Standing, Static Balance) (P)  maximal assist, 25 to 49% patient effort  -JIL maximal assist, 25 to 49% patient effort  -CL     Time Able to Maintain Position (Supported Standing, Static Balance) (P)  15 to 30 seconds  -JIL  --     Recorded by [JIL] Reid Mancilla, PT Student 06/18/18 1445 [CL] Radha Becerril OT 06/18/18 1000     Row Name 06/18/18 1401 06/18/18 0837          Positioning and Restraints    Pre-Treatment Position (P)  sitting in chair/recliner  -JIL in bed  -CL     Post Treatment Position (P)  chair  -JIL chair  -CL     In Chair (P)  notified nsg;reclined;sitting;call light within reach;encouraged to call for assist;exit alarm on;RUE elevated;LUE elevated;waffle cushion;on mechanical lift sling;legs elevated;R heel elevated;L heel elevated  -JLI notified nsg;reclined;call light within reach;encouraged to call for assist;exit alarm on;RUE elevated;LUE elevated;waffle cushion;on mechanical lift sling;legs elevated;heels elevated  -CL     Recorded by [JIL] Reid Mancilla, PT Student 06/18/18 1445 [CL] Radha Becerril OT 06/18/18 1000     Row Name 06/18/18 1401 06/18/18 0837          Pain Assessment    Additional Documentation (P)  Pain Scale: Numbers Pre/Post-Treatment (Group)  -JIL Pain Scale 2: FACES Pre/Post-Treatment (Group)  -CL     Recorded by [JIL] Reid Mancilla, PT Student 06/18/18 1445 [CL] Radha Becerril OT 06/18/18 1000     Row Name 06/18/18 1401             Pain Scale: Numbers  Pre/Post-Treatment    Pain Scale: Numbers, Pretreatment (P)  5/10  -JIL      Pain Scale: Numbers, Post-Treatment (P)  5/10  -JIL      Pain Location - Side (P)  Right  -JB2      Pain Location (P)  hand  -JB2      Pre/Post Treatment Pain Comment (P)  Pt tolerated session well. C/o of increased RUE on dorsal hand near IV insertion site.  -JB2      Pain Intervention(s) (P)  Repositioned;Ambulation/increased activity;Rest  -JB2      Recorded by [JIL] Reid Mancilla, PT Student 06/18/18 1445  [JB2] Reid Mancilla, PT Student 06/18/18 1446      Row Name 06/18/18 0837             Pain Scale 2: FACES Pre/Post-Treatment    Pain 2: FACES Scale, Pretreatment 2-->hurts little bit  -CL      Pain 2: FACES Scale, Post-Treatment 2-->hurts little bit  -CL      Pain Location 2 - Side Right  -CL      Pain Location 2 hand  -CL      Pre/Post Treatment Pain 2 Comment Tolerated.   -CL      Pain Intervention(s) 2 Repositioned;Ambulation/increased activity  -CL      Recorded by [CL] Radha Becerril, OT 06/18/18 1000      Row Name 06/18/18 1401             Coping    Observed Emotional State (P)  calm;cooperative  -JIL      Verbalized Emotional State (P)  acceptance  -JIL      Recorded by [JIL] Reid Mancilla, PT Student 06/18/18 1446      Row Name 06/18/18 1401             Plan of Care Review    Plan of Care Reviewed With (P)  patient  -JIL      Recorded by [JIL] Reid Mancilla, PT Student 06/18/18 1449      Row Name 06/18/18 1401             Outcome Summary/Treatment Plan (PT)    Daily Summary of Progress (PT) (P)  progress toward functional goals is good  -JIL      Recorded by [JIL] Reid Mancilla, PT Student 06/18/18 1446        User Key  (r) = Recorded By, (t) = Taken By, (c) = Cosigned By    Initials Name Effective Dates Discipline    CL Radha Becerril, OT 04/03/18 -  OT    JIL Reid Mancilla, PT Student 05/15/18 -  PT                     Physical Therapy Education     Title: PT OT SLP Therapies (Active)     Topic: Physical Therapy (Active)     Point: Mobility  training (Active)    Learning Progress Summary     Learner Status Readiness Method Response Comment Documented by    Patient Active Acceptance E NR  JIL 06/18/18 1401     Done Acceptance E,D VU,NR   06/15/18 1007     Active Acceptance E,D NR   06/14/18 1529    Family Active Acceptance E,D NR   06/14/18 1529          Point: Home exercise program (Active)    Learning Progress Summary     Learner Status Readiness Method Response Comment Documented by    Patient Active Acceptance E NR  JIL 06/18/18 1401     Done Acceptance E,D VU,NR   06/15/18 1007     Active Acceptance E,D NR   06/14/18 1529    Family Active Acceptance E,D NR  LS 06/14/18 1529          Point: Body mechanics (Active)    Learning Progress Summary     Learner Status Readiness Method Response Comment Documented by    Patient Active Acceptance E NR   06/18/18 1401     Done Acceptance E,D VU,NR   06/15/18 1007     Active Acceptance E,D NR   06/14/18 1529    Family Active Acceptance E,D NR   06/14/18 1529          Point: Precautions (Active)    Learning Progress Summary     Learner Status Readiness Method Response Comment Documented by    Patient Active Acceptance E NR   06/18/18 1401     Done Acceptance E,D VU,NR   06/15/18 1007     Active Acceptance E,D NR   06/14/18 1529    Family Active Acceptance E,D NR   06/14/18 1529                      User Key     Initials Effective Dates Name Provider Type Discipline     06/19/15 -  Piedad Tobias, PT Physical Therapist PT     05/15/18 -  Reid Mancilla, PT Student PT Student PT                    PT Recommendation and Plan     Outcome Summary/Treatment Plan (PT)  Daily Summary of Progress (PT): (P) progress toward functional goals is good  Plan of Care Reviewed With: (P) patient  Progress: (P) improving  Outcome Summary: (P) Pt demonstrated improved functional mobility this session; performing STS x 3 with Max A x 2, pt able to increase standing duration to >15 secs. Pt also demonstrated  improved static sitting balance, pt able to orient to midline with VC. Continues to be limited by R-sided weakness and gross motor control. Will continue to progress as clinically warranted per PT POC.          Outcome Measures     Row Name 06/18/18 1401 06/18/18 0837          How much help from another person do you currently need...    Turning from your back to your side while in flat bed without using bedrails? (P)  2  -JIL  --     Moving from lying on back to sitting on the side of a flat bed without bedrails? (P)  2  -JIL  --     Moving to and from a bed to a chair (including a wheelchair)? (P)  2  -JIL  --     Standing up from a chair using your arms (e.g., wheelchair, bedside chair)? (P)  2  -JIL  --     Climbing 3-5 steps with a railing? (P)  1  -JIL  --     To walk in hospital room? (P)  1  -JIL  --     AM-PAC 6 Clicks Score (P)  10  -JIL  --        How much help from another is currently needed...    Putting on and taking off regular lower body clothing?  -- 1  -CL     Bathing (including washing, rinsing, and drying)  -- 1  -CL     Toileting (which includes using toilet bed pan or urinal)  -- 1  -CL     Putting on and taking off regular upper body clothing  -- 2  -CL     Taking care of personal grooming (such as brushing teeth)  -- 2  -CL     Eating meals  -- 1  -CL     Score  -- 8  -CL        Modified [place] Scale    Modified [place] Scale  -- 4 - Moderately severe disability.  Unable to walk without assistance, and unable to attend to own bodily needs without assistance.  -CL        Functional Assessment    Outcome Measure Options (P)  AM-PAC 6 Clicks Basic Mobility (PT)  -JIL AM-PAC 6 Clicks Daily Activity (OT)  -CL       User Key  (r) = Recorded By, (t) = Taken By, (c) = Cosigned By    Initials Name Provider Type    CL Radha Becerril, OT Occupational Therapist    JIL Mancilla, PT Student PT Student           Time Calculation:         PT Charges     Row Name 06/18/18 1406             Time Calculation    Start  Time (P)  1401  -JIL      PT Received On (P)  06/18/18  -JIL      PT Goal Re-Cert Due Date (P)  06/24/18  -JIL         Time Calculation- PT    Total Timed Code Minutes- PT (P)  28 minute(s)  -JIL         Timed Charges    39895 - PT Therapeutic Exercise Minutes (P)  10  -JIL      38575 - PT Therapeutic Activity Minutes (P)  18  -JIL        User Key  (r) = Recorded By, (t) = Taken By, (c) = Cosigned By    Initials Name Provider Type    JIL Mancilla, PT Student PT Student        Therapy Suggested Charges     Code   Minutes Charges    06858 (CPT®) Hc Pt Neuromusc Re Education Ea 15 Min      89423 (CPT®) Hc Pt Ther Proc Ea 15 Min 10 1    10800 (CPT®) Hc Gait Training Ea 15 Min      88228 (CPT®) Hc Pt Therapeutic Act Ea 15 Min 18 1    83690 (CPT®) Hc Pt Manual Therapy Ea 15 Min      49674 (CPT®) Hc Pt Iontophoresis Ea 15 Min      40435 (CPT®) Hc Pt Elec Stim Ea-Per 15 Min      31619 (CPT®) Hc Pt Ultrasound Ea 15 Min      38279 (CPT®) Hc Pt Self Care/Mgmt/Train Ea 15 Min      Total  28 2        Therapy Charges for Today     Code Description Service Date Service Provider Modifiers Qty    50924160504 HC PT THER PROC EA 15 MIN 6/18/2018 Reid Mancilla, PT Student GP 1    04899198915 HC PT THERAPEUTIC ACT EA 15 MIN 6/18/2018 Reid Mancilla, PT Student GP 1          PT G-Codes  Outcome Measure Options: (P) AM-PAC 6 Clicks Basic Mobility (PT)    Reid Mancilla, PT Student  6/18/2018

## 2018-06-18 NOTE — THERAPY TREATMENT NOTE
Acute Care - Occupational Therapy Treatment Note  [facility]     Patient Name: Jessy Harmon  : 1945  MRN: [id number]  Today's Date: 2018  Onset of Illness/Injury or Date of Surgery: 18  Date of Referral to OT: 18  Referring Physician: MD Renu    Admit Date: 2018       ICD-10-CM ICD-9-CM   1. Cerebrovascular accident (CVA), unspecified mechanism I63.9 434.91   2. Vertebrobasilar artery stenosis I65.1 433.30    I65.09    3. Dysarthria R47.1 784.51   4. Weakness of left lower extremity R29.898 729.89   5. Impaired functional mobility, balance, gait, and endurance Z74.09 V49.89   6. Oropharyngeal dysphagia R13.12 787.22   7. Impaired mobility and ADLs Z74.09 799.89     Patient Active Problem List   Diagnosis   • CVA (cerebral vascular accident)   • Hyperlipidemia   • Hypertension   • Chronic kidney disease   • Stroke- History   • Atherosclerotic cerebrovascular disease   • Vertebrobasilar artery stenosis   • Cerebrovascular accident (CVA), basilar artery occlusion, s/p TPA/thrombectomy/stenting     Past Medical History:   Diagnosis Date   • Atherosclerotic cerebrovascular disease    • Chronic kidney disease    • Hyperlipidemia    • Hypertension    • Stroke     Multiple in 2011 and Dec 2011     Past Surgical History:   Procedure Laterality Date   • CEREBRAL ANGIOGRAM     • CEREBRAL ANGIOGRAM N/A 2018    Procedure: Cerebral angiogram;  Surgeon: Gilberto Sears MD;  Location:  [facility] CATH INVASIVE LOCATION;  Service: Interventional Radiology   • CORONARY ARTERY BYPASS GRAFT     • INTERVENTIONAL RADIOLOGY PROCEDURE Bilateral 2018    Procedure: Carotid Cerebral Angiogram;  Surgeon: Gilberto Sears MD;  Location:  "[facility]" INVASIVE LOCATION;  Service: Interventional Radiology   • INTRACRANIAL ARTERY ANGIOPLASTY         Therapy Treatment          Rehabilitation Treatment Summary     Row Name 18 0837             Treatment Time/Intention    Discipline occupational  therapist  -CL      Document Type therapy note (daily note)  -CL      Subjective Information complains of;weakness;pain  -CL      Existing Precautions/Restrictions fall   R sided weakness, NG  -CL      Recorded by [CL] Radha Becerril OT 06/18/18 0950      Row Name 06/18/18 0837             Vital Signs    Pre Systolic BP Rehab 171  -CL      Pre Treatment Diastolic BP 75  -CL      Post Systolic BP Rehab 137  -CL      Post Treatment Diastolic BP 67  -CL      Pretreatment Heart Rate (beats/min) 77  -CL      Posttreatment Heart Rate (beats/min) 75  -CL      Pre SpO2 (%) 91  -CL      O2 Delivery Pre Treatment supplemental O2  -CL      Post SpO2 (%) 91  -CL      O2 Delivery Post Treatment supplemental O2  -CL      Pre Patient Position Supine  -CL      Intra Patient Position Standing  -CL      Post Patient Position Sitting  -CL      Recorded by [CL] Radha Becerril OT 06/18/18 0950      Row Name 06/18/18 0837             Cognitive Assessment/Intervention- PT/OT    Affect/Mental Status (Cognitive) low arousal/lethargic  -CL      Orientation Status (Cognition) oriented to;person  -CL      Follows Commands (Cognition) follows one step commands;50-74% accuracy;repetition of directions required;verbal cues/prompting required;increased processing time needed  -CL      Cognitive Function (Cognitive) safety deficit  -CL      Safety Deficit (Cognitive) moderate deficit;awareness of need for assistance;safety precautions awareness  -CL      Personal Safety Interventions fall prevention program maintained;gait belt;nonskid shoes/slippers when out of bed  -CL      Recorded by [CL] Radha Becerril OT 06/18/18 0950      Row Name 06/18/18 0837             Bed Mobility Assessment/Treatment    Bed Mobility Assessment/Treatment supine-sit  -CL      Supine-Sit [place] (Bed Mobility) maximum assist (25% patient effort);2 person assist;verbal cues  -CL      Assistive Device (Bed Mobility) draw sheet;head of bed elevated  -CL      Comment (Bed  Mobility) Upon sitting EOB pt w/ significant R lateral and posterior LOB.   -CL      Recorded by [CL] Radha Becerril OT 06/18/18 0950      Row Name 06/18/18 0837             Transfer Assessment/Treatment    Transfer Assessment/Treatment bed-chair transfer;stand-sit transfer;sit-stand transfer  -CL      Comment (Transfers) Pt performed bent-pivot from bed to chair w/ R knee blocked and BUE support.   -CL      Bed-Chair [place] (Transfers) maximum assist (25% patient effort);2 person assist;verbal cues  -CL      Sit-Stand [place] (Transfers) maximum assist (25% patient effort);2 person assist;verbal cues  -CL      Stand-Sit [place] (Transfers) maximum assist (25% patient effort);2 person assist;verbal cues  -CL      Recorded by [CL] Radha Becerril OT 06/18/18 0950      Row Name 06/18/18 0837             Therapeutic Exercise    Therapeutic Exercise seated, upper extremities  -CL      56008 - OT Therapeutic Exercise Minutes 8  -CL      95684 - OT Therapeutic Activity Minutes 15  -CL      Recorded by [CL] Radha Becerril OT 06/18/18 0950      Row Name 06/18/18 0837             Upper Extremity Seated Therapeutic Exercise    Performed, Seated Upper Extremity (Therapeutic Exercise) shoulder flexion/extension;shoulder external/internal rotation;elbow flexion/extension;forearm supination/pronation;wrist flexion/extension;digit flexion/extension  -CL      Exercise Type, Seated Upper Extremity (Therapeutic Exercise) AAROM (active assistive range of motion)  -CL      Sets/Reps Detail, Seated Upper Extremity (Therapeutic Exercise) 1/10  -CL      Comment, Seated Upper Extremity (Therapeutic Exercise) RUE  -CL      Recorded by [CL] Radha Becerril OT 06/18/18 0950      Row Name 06/18/18 0837             Balance    Balance static sitting balance;static standing balance  -CL      Recorded by [CL] Radha Becerril OT 06/18/18 1000      Row Name 06/18/18 0837             Static Sitting Balance    Level of [place] (Unsupported  Sitting, Static Balance) maximal assist, 25 to 49% patient effort   Progressed to moments of CGA  -CL      Sitting Position (Unsupported Sitting, Static Balance) sitting on edge of bed  -CL      Comment (Unsupported Sitting, Static Balance) L lateral lean w/ LUE weight bearing, BLE support, RUE support, R/L WS, A/P WS  -CL      Recorded by [CL] Radha Becerril OT 06/18/18 1000      Row Name 06/18/18 0837             Static Standing Balance    Level of [place] (Supported Standing, Static Balance) maximal assist, 25 to 49% patient effort  -CL      Recorded by [CL] Radha Becerril OT 06/18/18 1000      Row Name 06/18/18 0837             Positioning and Restraints    Pre-Treatment Position in bed  -CL      Post Treatment Position chair  -CL      In Chair notified nsg;reclined;call light within reach;encouraged to call for assist;exit alarm on;RUE elevated;LUE elevated;waffle cushion;on mechanical lift sling;legs elevated;heels elevated  -CL      Recorded by [CL] Radha Becerril OT 06/18/18 1000      Row Name 06/18/18 0837             Pain Assessment    Additional Documentation Pain Scale 2: FACES Pre/Post-Treatment (Group)  -CL      Recorded by [CL] Radha Becerril OT 06/18/18 1000      Row Name 06/18/18 0837             Pain Scale 2: FACES Pre/Post-Treatment    Pain 2: FACES Scale, Pretreatment 2-->hurts little bit  -CL      Pain 2: FACES Scale, Post-Treatment 2-->hurts little bit  -CL      Pain Location 2 - Side Right  -CL      Pain Location 2 hand  -CL      Pre/Post Treatment Pain 2 Comment Tolerated.   -CL      Pain Intervention(s) 2 Repositioned;Ambulation/increased activity  -CL      Recorded by [CL] Radha Becerril OT 06/18/18 1000        User Key  (r) = Recorded By, (t) = Taken By, (c) = Cosigned By    Initials Name Effective Dates Discipline    CL Radha Becerril OT 04/03/18 -  OT               Occupational Therapy Education     Title: PT OT SLP Therapies (Active)     Topic: Occupational Therapy (Active)     Point: ADL  training (Active)     Description: Instruct learner(s) on proper safety adaptation and remediation techniques during self care or transfers.   Instruct in proper use of assistive devices.   Learning Progress Summary     Learner Status Readiness Method Response Comment Documented by    Patient Active Acceptance E NR Pt educatedon RUE HEP, positioning, role of OT, t/f techniques, appropriate safety precautions, and benefits of therapy. CL 06/18/18 1000     Active Acceptance E NR Pt educated on appropriate safety precautions, t/f techniques, positioning, role of OT, and benefits of therapy. CL 06/15/18 0955          Point: Home exercise program (Active)     Description: Instruct learner(s) on appropriate technique for monitoring, assisting and/or progressing therapeutic exercises/activities.   Learning Progress Summary     Learner Status Readiness Method Response Comment Documented by    Patient Active Acceptance E NR Pt educatedon RUE HEP, positioning, role of OT, t/f techniques, appropriate safety precautions, and benefits of therapy. CL 06/18/18 1000          Point: Precautions (Active)     Description: Instruct learner(s) on prescribed precautions during self-care and functional transfers.   Learning Progress Summary     Learner Status Readiness Method Response Comment Documented by    Patient Active Acceptance E NR Pt educatedon RUE HEP, positioning, role of OT, t/f techniques, appropriate safety precautions, and benefits of therapy. CL 06/18/18 1000     Active Acceptance E NR Pt educated on appropriate safety precautions, t/f techniques, positioning, role of OT, and benefits of therapy. CL 06/15/18 0955          Point: Body mechanics (Active)     Description: Instruct learner(s) on proper positioning and spine alignment during self-care, functional mobility activities and/or exercises.   Learning Progress Summary     Learner Status Readiness Method Response Comment Documented by    Patient Active Acceptance E NR  Pt educatedon RUE HEP, positioning, role of OT, t/f techniques, appropriate safety precautions, and benefits of therapy.  06/18/18 1000     Active Acceptance E NR Pt educated on appropriate safety precautions, t/f techniques, positioning, role of OT, and benefits of therapy.  06/15/18 0955                      User Key     Initials Effective Dates Name Provider Type Discipline     04/03/18 -  Radha Becerril OT Occupational Therapist OT                OT Recommendation and Plan  Outcome Summary/Treatment Plan (OT)  Anticipated Equipment Needs at Discharge (OT):  (TBA further)  Anticipated Discharge Disposition (OT): inpatient rehabilitation facility  Therapy Frequency (OT Eval): daily  Plan of Care Review  Plan of Care Reviewed With: patient  Plan of Care Reviewed With: patient  Outcome Summary: Pt conts to be Max A x2 to perform bent/pivot t/f from bed to chair, though progressed from Max A to moments of CGA for static sitting balance. Pt conts to be limited d/t R sided weakness. Recommend cont skilled IPOT POC.         Outcome Measures     Row Name 06/18/18 0837             How much help from another is currently needed...    Putting on and taking off regular lower body clothing? 1  -CL      Bathing (including washing, rinsing, and drying) 1  -CL      Toileting (which includes using toilet bed pan or urinal) 1  -CL      Putting on and taking off regular upper body clothing 2  -CL      Taking care of personal grooming (such as brushing teeth) 2  -CL      Eating meals 1  -CL      Score 8  -CL         Modified [place] Scale    Modified [place] Scale 4 - Moderately severe disability.  Unable to walk without assistance, and unable to attend to own bodily needs without assistance.  -CL         Functional Assessment    Outcome Measure Options AM-PAC 6 Clicks Daily Activity (OT)  -CL        User Key  (r) = Recorded By, (t) = Taken By, (c) = Cosigned By    Initials Name Provider Type    CL Radha Becerril OT Occupational  Therapist           Time Calculation:         Time Calculation- OT     Row Name 06/18/18 0837             Time Calculation- OT    OT Received On 06/18/18  -CL      OT Goal Re-Cert Due Date 06/25/18  -CL         Timed Charges    03909 - OT Therapeutic Exercise Minutes 8  -CL      26315 - OT Therapeutic Activity Minutes 15  -CL        User Key  (r) = Recorded By, (t) = Taken By, (c) = Cosigned By    Initials Name Provider Type    CL Radha Becerril OT Occupational Therapist           Therapy Suggested Charges     Code   Minutes Charges    22827 (CPT®) Hc Ot Neuromusc Re Education Ea 15 Min      59181 (CPT®) Hc Ot Ther Proc Ea 15 Min 8 1    65905 (CPT®) Hc Gait Training Ea 15 Min      69099 (CPT®) Hc Ot Therapeutic Act Ea 15 Min 15 1    55333 (CPT®) Hc Ot Manual Therapy Ea 15 Min      17113 (CPT®) Hc Ot Iontophoresis Ea 15 Min      08347 (CPT®) Hc Ot Elec Stim Ea-Per 15 Min      55808 (CPT®) Hc Ot Ultrasound Ea 15 Min      14053 (CPT®) Hc Ot Self Care/Mgmt/Train Ea 15 Min      Total  23 2        Therapy Charges for Today     Code Description Service Date Service Provider Modifiers Qty    83255839236 HC OT THER PROC EA 15 MIN 6/18/2018 Radha Becerril OT GO 1    02135324446 HC OT THERAPEUTIC ACT EA 15 MIN 6/18/2018 Radha Becerril OT GO 1               Radha Becerril OT  6/18/2018

## 2018-06-18 NOTE — MBS/VFSS/FEES
Acute Care - Speech Language Pathology   Swallow Re-Evaluation [facility]   Fiberoptic Endoscopic Evaluation of Swallowing (FEES)     Patient Name: Jessy Harmon  : 1945  MRN: [id number]  Today's Date: 2018  Onset of Illness/Injury or Date of Surgery: 18     Referring Physician: MD Renu      Admit Date: 2018    Visit Dx:     ICD-10-CM ICD-9-CM   1. Cerebrovascular accident (CVA), unspecified mechanism I63.9 434.91   2. Vertebrobasilar artery stenosis I65.1 433.30    I65.09    3. Dysarthria R47.1 784.51   4. Weakness of left lower extremity R29.898 729.89   5. Impaired functional mobility, balance, gait, and endurance Z74.09 V49.89   6. Oropharyngeal dysphagia R13.12 787.22   7. Impaired mobility and ADLs Z74.09 799.89     Patient Active Problem List   Diagnosis   • CVA (cerebral vascular accident)   • Hyperlipidemia   • Hypertension   • Chronic kidney disease   • Stroke- History   • Atherosclerotic cerebrovascular disease   • Vertebrobasilar artery stenosis   • Cerebrovascular accident (CVA), basilar artery occlusion, s/p TPA/thrombectomy/stenting     Past Medical History:   Diagnosis Date   • Atherosclerotic cerebrovascular disease    • Chronic kidney disease    • Hyperlipidemia    • Hypertension    • Stroke     Multiple in 2011 and Dec 2011     Past Surgical History:   Procedure Laterality Date   • CEREBRAL ANGIOGRAM     • CEREBRAL ANGIOGRAM N/A 2018    Procedure: Cerebral angiogram;  Surgeon: Gilberto Sears MD;  Location:  [facility];  Service: Interventional Radiology   • CORONARY ARTERY BYPASS GRAFT     • INTERVENTIONAL RADIOLOGY PROCEDURE Bilateral 2018    Procedure: Carotid Cerebral Angiogram;  Surgeon: Gilberto Sears MD;  Location:  [facility];  Service: Interventional Radiology   • INTRACRANIAL ARTERY ANGIOPLASTY            SWALLOW EVALUATION (last 72 hours)      SLP Adult Swallow Evaluation     Row Name 18 1515  06/17/18 1200                Rehab Evaluation    Document Type re-evaluation  -RD evaluation  -AV       Subjective Information complains of;weakness  -RD complains of;weakness  -AV       Patient Observations alert;cooperative;agree to therapy  -RD alert;cooperative  -AV       Patient/Family Observations No family present  -RD grandson present   -AV       Patient Effort good  -RD adequate  -AV       Comment  -- patient reports was using head turn to the right for swallowing before admit to hospital   -AV          General Information    Patient Profile Reviewed yes  -RD yes  -AV       Pertinent History Of Current Problem Adm w/ CVA s/p thrombectomy/stenting. Hx of prior CVA 5/22/18. R wkns. Failed RN dys screen.   -RD  --       Current Method of Nutrition NPO;nasogastric feedings  -RD NPO  -AV       Precautions/Limitations, Vision vision impairment, bilaterally;vision impairment, left  -RD  --       Precautions/Limitations, Hearing WFL;for purposes of eval  -RD WFL;for purposes of eval  -AV       Prior Level of Function-Communication WFL  -RD  --       Prior Level of Function-Swallowing no diet consistency restrictions  -RD --   patient reports was using head turn to right before admit   -AV       Plans/Goals Discussed with patient;family  -RD patient;family  -AV       Barriers to Rehab medically complex  -RD medically complex  -AV       Patient's Goals for Discharge return to PO diet  -RD return to PO diet  -AV       Family Goals for Discharge  -- patient able to return to PO diet  -AV          Pain Assessment    Additional Documentation Pain Scale: Word Pre/Post-Treatment (Group)  -RD Pain Scale: Numbers Pre/Post-Treatment (Group)  -AV          Pain Scale: Numbers Pre/Post-Treatment    Pain Scale: Numbers, Pretreatment  -- 0/10 - no pain  -AV       Pain Scale: Numbers, Post-Treatment  -- 0/10 - no pain  -AV          Pain Scale: Word Pre/Post-Treatment    Pain: Word Scale, Pretreatment 2 - mild pain  -RD  --        Pain: Word Scale, Post-Treatment 2 - mild pain  -RD  --          Oral Motor and Function    Dentition Assessment  -- edentulous, does not have dentures  -AV       Secretion Management  -- anterior loss  -AV       Mucosal Quality  -- moist, healthy  -AV       Volitional Swallow  -- delayed  -AV       Volitional Cough  -- weak  -AV          Oral Musculature and Cranial Nerve Assessment    Oral Motor General Assessment  -- generalized oral motor weakness  -AV          General Eating/Swallowing Observations    Eating/Swallowing Skills  -- fed by SLP  -AV       Positioning During Eating  -- upright 90 degree;upright in bed  -AV       Utensils Used  -- spoon  -AV       Consistencies Trialed  -- pureed;thin liquids  -AV          Clinical Swallow Eval    Oral Prep Phase  -- impaired  -AV       Oral Transit  -- impaired  -AV       Oral Residue  -- impaired  -AV       Pharyngeal Phase  -- suspected pharyngeal impairment  -AV       Esophageal Phase  -- unremarkable  -AV       Clinical Swallow Evaluation Summary  -- Bedside eval completed this am. PAtient was coughing on secretions prior to eval. Patient with anterior loss with trials. ASked for bolus to be placed on right side of mouth and was using right head turn as patient reports was previously using compsenatory strategy before admitted for this event.  Delayed initiation. Rec FEES to further assess pharyngeal skills. PAtient in agreement.   -AV          Fiberoptic Endoscopic Evaluation of Swallowing (FEES)    Risks/Benefits Reviewed risks/benefits explained;patient;agreed to eval  -RD  --       Nasal Entry right:  -RD  --          Anatomy and Physiology    Velopharyngeal WFL  -RD  --       Base of Tongue asymmetrical;range reduced  -RD  --       Epiglottis WFL  -RD  --       Laryngeal Function Breathing decreased movement right  -RD  --       Laryngeal Function Phonation decreased movement right  -RD  --       Laryngeal Function to Breath Hold can't sustain closure  -RD   --       Secretion Rating Scale (Chevy et al. 1996) 2- secretions initially outside the vestibule but later entered the vestibule  -RD  --       Secretion Description thin;clear;white  -RD  --       Ice Chips DNA  -RD  --       Spontaneous Swallow frequency reduced;did not clear secretions  -RD  --       Sensory sensed scope  -RD  --       Utensils Used Spoon;Cup;Straw  -RD  --       Consistencies Trialed thin liquids;pudding/puree  -RD  --          FEES Interpretation    Oral Phase increased A-P transit time;prespill of liquids into pharynx  -RD  --       Oral Phase, Comment Incr'd oral prep and A-P transit w/ all consistencies 2' lingual weakness. Pre-spill of both thins and pudding 2' reduced back of tongue control.  -RD  --          Initiation of Pharyngeal Swallow    Initiation of Pharyngeal Swallow bolus in pyriform sinuses;other (see comments)   over the laryngeal surface of the epiglottis  -RD  --       Pharyngeal Phase impaired pharyngeal phase of swallowing  -RD  --       Penetration Before the Swallow thin liquids;pudding/puree;secondary to reduced back of tongue control;secondary to delayed swallow initiation or mistiming  -RD  --       Aspiration During the Swallow thin liquids;pudding/puree;secondary to delayed swallow initiation or mistiming;secondary to reduced laryngeal elevation;secondary to reduced vestibular closure  -RD  --       Penetration After the Swallow thin liquids;pudding/puree  -RD  --       Aspiration After the Swallow thin liquids;pudding/puree;secondary to residue;in valleculae;in pyriform sinuses;in laryngeal vestibule  -RD  --       Response to Aspiration no response, silent aspiration;inconsistent response;weak cough/throat clear  -RD  --       Rosenbek's Scale thin:;pudding/puree:;8-->Level 8  -RD  --       Residue thin liquids;mixed consistency;valleculae;pyriform sinuses;posterior pharyngeal wall;laryngeal vestibule;secondary to reduced base of tongue retraction;secondary to  reduced posterior pharyngeal wall stripping;secondary to reduced laryngeal elevation;secondary to reduced hyolaryngeal excursion  -RD  --       Response to Residue unable to clear residue;with spontaneous subsequent swallow;with cued swallow;with compensatory maneuver (see comments)  -RD  --       Attempted Compensatory Maneuvers bolus size;bolus presentation style;chin tuck;head turn to right;multiple swallows;alternate liquids/solids  -RD  --       Response to Attempted Compensatory Maneuvers did not prevent aspiration;did not reduce residue  -RD  --       Pharyngeal Phase, Comment Severe pharyngeal dysphagia. Penetration before/during the swallow 2' delayed swallow intiation. Aspiration during/after the swallow 2' impaired timing, decr'd vestibular closure, and reduced elevation/excursion. Moderate-severe diffuse pharyngeal residue (> on R side, especially R pyriform). Attempted multiple compensations: including head turn R, chin tuck, throat clear after, etc., however these were unsuccessful to eliminate aspiration consistently. Aspiration on this exam was mostly silent. Occasional sensation w/ throat clear. Still not safe for any PO at this time. Continue dysphagia treatment.   -RD  --          Clinical Impression    SLP Swallowing Diagnosis severe;pharyngeal dysfunction;moderate;oral dysfunction  -RD oral dysfunction;suspected pharyngeal dysfunction  -AV       Functional Impact risk of aspiration/pneumonia;risk of malnutrition;risk of dehydration  -RD risk of aspiration/pneumonia  -AV       Rehab Potential/Prognosis, Swallowing adequate, monitor progress closely  -RD adequate, monitor progress closely  -AV       Criteria for Skilled Therapeutic Interventions Met demonstrates skilled criteria  -RD demonstrates skilled criteria  -AV          Recommendations    Therapy Frequency (Swallow) 5 days per week  -RD  --       Predicted Duration Therapy Intervention (Days) until discharge  -RD  --       SLP Diet  Recommendation NPO  -RD NPO  -AV       Recommended Diagnostics  -- FEES  -AV       Recommended Precautions and Strategies other (see comments)   Oral care BID and PRN, aspiration precautions  -RD  --       SLP Rec. for Method of Medication Administration meds via alternate route  -RD meds via alternate route  -AV       Anticipated Dischage Disposition inpatient rehabilitation facility;anticipate therapy at next level of care  -RD inpatient rehabilitation facility  -AV         User Key  (r) = Recorded By, (t) = Taken By, (c) = Cosigned By    Initials Name Effective Dates    AV Sita Mercado, MS CCC-SLP 04/03/18 -     RD Sary [place], MS CCC-SLP 04/03/18 -         EDUCATION  The patient has been educated in the following areas:   Dysphagia (Swallowing Impairment) Oral Care/Hydration NPO rationale.    SLP Recommendation and Plan  SLP Swallowing Diagnosis: severe, pharyngeal dysfunction, moderate, oral dysfunction  SLP Diet Recommendation: NPO  Recommended Precautions and Strategies: other (see comments) (Oral care BID and PRN, aspiration precautions)           Criteria for Skilled Therapeutic Interventions Met: demonstrates skilled criteria  Anticipated Dischage Disposition: inpatient rehabilitation facility, anticipate therapy at next level of care  Rehab Potential/Prognosis, Swallowing: adequate, monitor progress closely  Therapy Frequency (Swallow): 5 days per week  Predicted Duration Therapy Intervention (Days): until discharge       Plan of Care Reviewed With: patient  Plan of Care Review  Plan of Care Reviewed With: patient  Progress: no change          SLP GOALS     Row Name 06/18/18 3699             Oral Nutrition/Hydration Goal 1 (SLP)    Oral Nutrition/Hydration Goal 1, SLP LTG: Pt will improve swallowing skills to begin to take some PO safely w/ 100% accuracy w/o cues.   -RD      Time Frame (Oral Nutrition/Hydration Goal 1, SLP) by discharge  -RD      Progress/Outcomes (Oral Nutrition/Hydration Goal  1, SLP) continuing progress toward goal  -RD         Oral Nutrition/Hydration Goal 2 (SLP)    Oral Nutrition/Hydration Goal 2, SLP Pt will tolerate ice chip/thin trials post oral care w/ no overt s/s of aspiration w/ 70% accuracy w/o cues.   -RD      Time Frame (Oral Nutrition/Hydration Goal 2, SLP) short term goal (STG);by discharge  -RD      Progress/Outcomes (Oral Nutrition/Hydration Goal 2, SLP) continuing progress toward goal  -RD         Labial Strengthening Goal 1 (SLP)    Activity (Labial Strengthening Goal 1, SLP) increase labial tone;increase labial sensation/afferent drive  -RD      Increase Labial Tone labial resistance exercises  -RD      Increase Labial Sensation/Afferent Drive swallow trials  -RD      [place]/Accuracy (Labial Strengthening Goal 1, SLP) with minimal cues (75-90% accuracy)  -RD      Time Frame (Labial Strengthening Goal 1, SLP) short term goal (STG);by discharge  -RD      Progress/Outcomes (Labial Strengthening Goal 1, SLP) goal ongoing  -RD         Lingual Strengthening Goal 1 (SLP)    Activity (Lingual Strengthening Goal 1, SLP) increase lingual tone/sensation/control/coordination/movement;increase tongue back strength;increase buccal tension or tone  -RD      Increase Lingual Tone/Sensation/Control/Coordination/Movement lingual resistance exercises  -RD      Increase Buccal Tension or Tone swallow trials  -RD      Increase Tongue Back Strength lingual resistance exercises  -RD      [place]/Accuracy (Lingual Strengthening Goal 1, SLP) with minimal cues (75-90% accuracy)  -RD      Time Frame (Lingual Strengthening Goal 1, SLP) short term goal (STG);by discharge  -RD      Progress/Outcomes (Lingual Strengthening Goal 1, SLP) goal ongoing  -RD         Pharyngeal Strengthening Exercise Goal 1 (SLP)    Activity (Pharyngeal Strengthening Goal 1, SLP) increase timing;increase superior movement of the hyolaryngeal complex;increase anterior movement of the hyolaryngeal  complex;increase closure at entrance to airway/closure of airway at glottis;increase tongue base retraction  -RD      Increase Timing prepping - 3 second prep or suck swallow or 3-step swallow  -RD      Increase Superior Movement of the Hyolaryngeal Complex effortful pitch glide (falsetto + pharyngeal squeeze)  -RD      Increase Anterior Movement of the Hyolaryngeal Complex chin tuck against resistance (CTAR)  -RD      Increase Closure at Entrance to Airway/Closure of Airway at Glottis super-supraglottic swallow  -RD      Increase Tongue Base Retraction hard effortful swallow  -RD      [place]/Accuracy (Pharyngeal Strengthening Goal 1, SLP) with minimal cues (75-90% accuracy)  -RD      Time Frame (Pharyngeal Strengthening Goal 1, SLP) short term goal (STG);by discharge  -RD      Progress/Outcomes (Pharyngeal Strengthening Goal 1, SLP) goal ongoing  -RD        User Key  (r) = Recorded By, (t) = Taken By, (c) = Cosigned By    Initials Name Provider Type    ARACELI Florian MS CCC-SLP Speech and Language Pathologist               Time Calculation:         Time Calculation- SLP     Row Name 06/18/18 1638             Time Calculation- SLP    SLP Start Time 1515  -RD      SLP Received On 06/18/18  -RD        User Key  (r) = Recorded By, (t) = Taken By, (c) = Cosigned By    Initials Name Provider Type    ARACELI Florian MS CCC-SLP Speech and Language Pathologist          Therapy Charges for Today     Code Description Service Date Service Provider Modifiers Qty    [id number] HC ST FIBEROPTIC ENDO EVAL SWALL 8 6/18/2018 Sary Florian MS CCC-SHOLA GN 1               MS AFIA Andre  6/18/2018

## 2018-06-18 NOTE — PROGRESS NOTES
Continued Stay Note  [facility]     Patient Name: Jessy Harmon  MRN: [id number]  Today's Date: 6/18/2018    Admit Date: 6/13/2018          Discharge Plan     Row Name 06/18/18 1421       Plan    Plan [facility]    Patient/Family in Agreement with Plan yes    Plan Comments Goal is to transfer to [facility]. Per Dr Given note today he feels patient will pass dysphasia screening. I spoke with Marva at [facility] today she is following.    Final Discharge Disposition Code 62 - inpatient rehab facility              Discharge Codes    No documentation.       Expected Discharge Date and Time     Expected Discharge Date Expected Discharge Time    Jun 18, 2018             Maggi Lemus RN

## 2018-06-18 NOTE — PLAN OF CARE
Problem: Patient Care Overview  Goal: Plan of Care Review  Outcome: Ongoing (interventions implemented as appropriate)   06/18/18 1401   Plan of Care Review   Progress improving   Coping/Psychosocial   Plan of Care Reviewed With patient   OTHER   Outcome Summary Pt demonstrated improved functional mobility this session; performing STS x 3 with Max A x 2, pt able to increase standing duration to >15 secs. Pt also demonstrated improved static sitting balance, pt able to orient to midline with VC. Continues to be limited by R-sided weakness and gross motor control. Will continue to progress as clinically warranted per PT POC.

## 2018-06-19 LAB
GLUCOSE BLDC GLUCOMTR-MCNC: 132 MG/DL (ref 70–130)
GLUCOSE BLDC GLUCOMTR-MCNC: 147 MG/DL (ref 70–130)
GLUCOSE BLDC GLUCOMTR-MCNC: 173 MG/DL (ref 70–130)

## 2018-06-19 PROCEDURE — 63710000001 INSULIN REGULAR HUMAN PER 5 UNITS

## 2018-06-19 PROCEDURE — 99232 SBSQ HOSP IP/OBS MODERATE 35: CPT | Performed by: RADIOLOGY

## 2018-06-19 PROCEDURE — 97530 THERAPEUTIC ACTIVITIES: CPT

## 2018-06-19 PROCEDURE — 82962 GLUCOSE BLOOD TEST: CPT

## 2018-06-19 PROCEDURE — 25010000002 HYDRALAZINE PER 20 MG: Performed by: NURSE PRACTITIONER

## 2018-06-19 PROCEDURE — 99232 SBSQ HOSP IP/OBS MODERATE 35: CPT | Performed by: INTERNAL MEDICINE

## 2018-06-19 RX ORDER — FAMOTIDINE 20 MG/1
20 TABLET, FILM COATED ORAL ONCE
Status: CANCELLED | OUTPATIENT
Start: 2018-06-19 | End: 2018-06-19

## 2018-06-19 RX ORDER — SODIUM CHLORIDE 0.9 % (FLUSH) 0.9 %
1-10 SYRINGE (ML) INJECTION AS NEEDED
Status: CANCELLED | OUTPATIENT
Start: 2018-06-19

## 2018-06-19 RX ORDER — SODIUM CHLORIDE, SODIUM LACTATE, POTASSIUM CHLORIDE, CALCIUM CHLORIDE 600; 310; 30; 20 MG/100ML; MG/100ML; MG/100ML; MG/100ML
9 INJECTION, SOLUTION INTRAVENOUS CONTINUOUS
Status: CANCELLED | OUTPATIENT
Start: 2018-06-19

## 2018-06-19 RX ORDER — CEFAZOLIN SODIUM 2 G/100ML
2 INJECTION, SOLUTION INTRAVENOUS ONCE
Status: DISCONTINUED | OUTPATIENT
Start: 2018-06-20 | End: 2018-06-20 | Stop reason: HOSPADM

## 2018-06-19 RX ORDER — LIDOCAINE HYDROCHLORIDE 10 MG/ML
0.5 INJECTION, SOLUTION EPIDURAL; INFILTRATION; INTRACAUDAL; PERINEURAL ONCE AS NEEDED
Status: CANCELLED | OUTPATIENT
Start: 2018-06-19

## 2018-06-19 RX ADMIN — ATORVASTATIN CALCIUM 80 MG: 40 TABLET, FILM COATED ORAL at 20:33

## 2018-06-19 RX ADMIN — INSULIN HUMAN 2 UNITS: 100 INJECTION, SOLUTION PARENTERAL at 23:49

## 2018-06-19 RX ADMIN — Medication 20 MG: at 22:05

## 2018-06-19 RX ADMIN — METOPROLOL TARTRATE 12.5 MG: 25 TABLET, FILM COATED ORAL at 12:18

## 2018-06-19 RX ADMIN — ASPIRIN 81 MG CHEWABLE TABLET 81 MG: 81 TABLET CHEWABLE at 08:32

## 2018-06-19 RX ADMIN — METOPROLOL TARTRATE 12.5 MG: 25 TABLET, FILM COATED ORAL at 20:33

## 2018-06-19 RX ADMIN — Medication 1 PACKET: at 08:32

## 2018-06-19 RX ADMIN — NICARDIPINE HYDROCHLORIDE 5 MG/HR: 0.2 INJECTION, SOLUTION INTRAVENOUS at 23:38

## 2018-06-19 RX ADMIN — AMLODIPINE BESYLATE 10 MG: 10 TABLET ORAL at 08:32

## 2018-06-19 RX ADMIN — INSULIN HUMAN 2 UNITS: 100 INJECTION, SOLUTION PARENTERAL at 12:13

## 2018-06-19 RX ADMIN — CLOPIDOGREL BISULFATE 75 MG: 75 TABLET ORAL at 08:32

## 2018-06-19 NOTE — PROGRESS NOTES
Intensive Care Follow-up      LOS: 6 days     Ms. Jessy Harmon, 72 y.o. female is followed for: Cerebrovascular accident (CVA)     Subjective - Interval History     No problems overnight  Oxygenating well on room air  Tolerating tube feeding via nasoduodenal tube  Unfortunately, failed fees evaluation yesterday    The patient's relevant past medical, surgical and social history were reviewed and updated in Epic as appropriate.     Objective     Infusions:    niCARdipine 5-15 mg/hr Last Rate: Stopped (06/18/18 0203)   phenylephrine (BALA-SYNEPHRINE) 50 mg/250 (0.2 mg/mL) infusion 0.5-3 mcg/kg/min      Medications:    amLODIPine 10 mg Oral Q24H   aspirin 81 mg Oral Daily   atorvastatin 80 mg Oral Nightly   clopidogrel 75 mg Oral Daily   insulin regular 0-7 Units Subcutaneous Q6H   metoprolol tartrate 12.5 mg Oral Q12H   PRO-STAT 1 packet Nasogastric Daily     Intake/Output       06/18/18 0700 - 06/19/18 0659 06/19/18 0700 - 06/20/18 0659    Intake (ml) 1570 0    Output (ml) 2110 0    Net (ml) -540 0        Vital Sign Min/Max for last 24 hours  Temp  Min: 98 °F (36.7 °C)  Max: 98.6 °F (37 °C)   BP  Min: 130/87  Max: 154/91   Pulse  Min: 68  Max: 87   Resp  Min: 16  Max: 22   SpO2  Min: 93 %  Max: 98 %   No Data Recorded        Physical Exam:   GENERAL: Awake, no distress   HEENT: Feeding tube in good position, no adenopathy   LUNGS: No wheezes or rhonchi   HEART: Regular rate and rhythm without murmurs   ABDOMEN: Soft, nontender   EXTREMITIES: Palpable pulses.  Trace edema   NEURO/PSYCH: Continued right-sided weakness, dysarthria      Results from last 7 days  Lab Units 06/18/18  0445 06/17/18  0416 06/15/18  0414   WBC 10*3/mm3 10.66 13.21* 10.92*   HEMOGLOBIN g/dL 13.2 15.6* 11.4*   PLATELETS 10*3/mm3 231 288 233       Results from last 7 days  Lab Units 06/18/18  0445 06/17/18  0416 06/15/18  1938 06/15/18  1308 06/15/18  0414   SODIUM mmol/L 140 136  --   --  140   POTASSIUM mmol/L 4.0 4.0 4.8  --  3.3*   CO2  mmol/L 28.0 27.0  --   --  20.0   BUN mg/dL 34* 25*  --   --  18   CREATININE mg/dL 0.84 0.87  --   --  0.60   MAGNESIUM mg/dL 2.2  --   --  2.1  --    PHOSPHORUS mg/dL 2.9  --   --  2.6  --    GLUCOSE mg/dL 191* 189*  --   --  108*     Estimated Creatinine Clearance: 58.2 mL/min (by C-G formula based on SCr of 0.84 mg/dL).      Results from last 7 days  Lab Units 06/14/18  0512   HEMOGLOBIN A1C % 6.10*           No results found for: LACTATE       Images: Most recent chest x-ray without consolidation or effusions    I reviewed the patient's results and images.     Impression      Hospital Problem List     * (Principal)Cerebrovascular accident (CVA), basilar artery occlusion, s/p TPA/thrombectomy/stenting    Hyperlipidemia    Hypertension    Chronic kidney disease    Stroke- History    Overview Signed 5/22/2018  1:40 PM by KENIA Wright     Multiple in September 2011 and Dec 2011                    Plan        Better blood pressure management  Possible PEG  Inpatient rehabilitation  Stable for transfer to the floor    Plan of care and goals reviewed with mulitdisciplinary team at daily rounds   I discussed the patient's findings and my recommendations with patient, family and nursing staff       JOSEFINA Melton MD  Pulmonary and Critical Care Medicine

## 2018-06-19 NOTE — PROGRESS NOTES
NAME: LIDA MAZARIEGOS  : 1945  PCP: Milvia Frye MD  ADMITTING PHYSICIAN: Светлана De La Torre DO    DATE OF ADMISSION:  2018  DATE OF SERVICE: 2018    HOSPITAL DAY:  6 days    HISTORY OF PRESENT ILLNESS:  72 y.o. female well known to the Neurointerventional service, having been treated on multiple occasions for advanced intracranial atherosclerotic disease (ICAD) and prior basilar occlusion X 2.  She's had angioplasty/stent placement involving her intracranial right vertebral artery greater than 5 years ago ([employer]).  She did very well for several years, but presented to [facility] in mid-May 2018 for recurrent ICAD and basilar thrombosis.  She was transferred to [facility], and underwent emergent neurointervention with IA tPA/thrombectomy and angioplasty of critical stenosis of right VB junction on 18.  This represented progression of disease, as her prior stents were widely patent.  She was restarted on Plavix/ASA regimen, and made an excellent recovery.       She re-presented to [facility] emergency department on 2018 with a 2 day history of worsening speech difficulties and worsening left lower extremity weakness. Given her sub--acute presentation, with symptom onset occurring 2 days prior, she was deemed not a candidate for IV TPA therapy.  She did undergo MR angiography of the head, which demonstrated reocclusion of her basilar artery.  Given concern for further progression of symptoms and worsening brainstem strokes, she was taken for emergent neuro intervention.    REVIEW OF IMAGING:  No new imaging.    LABS:  Lab Results   Component Value Date    WBC 10.66 2018    HGB 13.2 2018    HCT 42.7 2018    MCV 87.5 2018     2018     Lab Results   Component Value Date    GLUCOSE 191 (H) 2018    CALCIUM 9.2 2018     2018    K 4.0 2018    CO2 28.0 2018      06/18/2018    BUN 34 (H) 06/18/2018    CREATININE 0.84 06/18/2018    EGFRIFAFRI 77 06/17/2018    BCR 28.7 (H) 06/17/2018    ANIONGAP 6.0 06/18/2018     Lab Results   Component Value Date    HGBA1C 6.10 (H) 06/14/2018     Lab Results   Component Value Date    CHOL 112 06/18/2018    TRIG 59 06/18/2018    HDL 45 06/14/2018     06/14/2018       CURRENT MEDS:  Current Facility-Administered Medications   Medication Dose Route Frequency Provider Last Rate Last Dose   • amLODIPine (NORVASC) tablet 10 mg  10 mg Oral Q24H Raul Melton MD   10 mg at 06/19/18 0832   • aspirin chewable tablet 81 mg  81 mg Oral Daily Gilberto Sears MD   81 mg at 06/19/18 0832   • atorvastatin (LIPITOR) tablet 80 mg  80 mg Oral Nightly Gilberto Sears MD   80 mg at 06/18/18 2126   • clopidogrel (PLAVIX) tablet 75 mg  75 mg Oral Daily Gilberto Sears MD   75 mg at 06/19/18 0832   • dextrose (D50W) solution 25 g  25 g Intravenous Q15 Min PRN Raul Melton MD       • dextrose (GLUTOSE) oral gel 15 g  15 g Oral Q15 Min PRN Raul Melton MD       • glucagon (human recombinant) (GLUCAGEN DIAGNOSTIC) injection 1 mg  1 mg Subcutaneous PRN Raul Melton MD       • hydrALAZINE (APRESOLINE) injection 20 mg  20 mg Intravenous Q6H PRN KENIA Mann   20 mg at 06/17/18 1815   • HYDROcodone-acetaminophen (NORCO) 5-325 MG per tablet 1 tablet  1 tablet Oral Q4H PRN Gilberto Sears MD   1 tablet at 06/14/18 2025   • insulin regular (humuLIN R,novoLIN R) injection 0-7 Units  0-7 Units Subcutaneous Q6H Michell Guerra [facility]   2 Units at 06/19/18 1213   • metoprolol tartrate (LOPRESSOR) half tablet 12.5 mg  12.5 mg Oral Q12H Raul Melton MD   12.5 mg at 06/19/18 1218   • niCARdipine (CARDENE-IV) 40 mg/200 mL (0.2 mg/mL) in 0.9% NaCl infusion  5-15 mg/hr Intravenous Titrated Светлана V. Case, DO   Stopped at 06/18/18 0203   • ondansetron (ZOFRAN) injection 4 mg  4 mg Intravenous Q6H PRN Gilberto Sears MD    4 mg at 06/14/18 0924   • phenylephrine (BALA-SYNEPHRINE) 50 mg in sodium chloride 0.9 % 250 mL (0.2 mg/mL) infusion  0.5-3 mcg/kg/min Intravenous Titrated Gilberto Sears MD       • potassium chloride (KLOR-CON) packet 40 mEq  40 mEq Oral PRN Behzad Tamez, APRN   40 mEq at 06/15/18 1205   • PRO-STAT 1 packet  1 packet Nasogastric Daily Danette Cuevas, RD   1 packet at 06/19/18 0832   • sodium chloride 0.9 % flush 1-10 mL  1-10 mL Intravenous PRN Gilberto Sears MD           PHYSICAL EXAM:  Vitals:    06/19/18 1300   BP: 142/84   Pulse: 68   Resp: 20   Temp:    SpO2: 95%      She remains dysarthric, but not aphasic.  She has 1-2/5 strength in the right lower extremity and 2/5 strength in the right upper extremity.  Her right  strength is actually improved since yesterday.  She states that her diplopia has resolved.  He is yet to pass dysphasia screening.    ASSESSMENT/PLAN:  Ms. Harmon is a 72 y.o. female  well known to the neuro interventional service, having been treated on multiple occasions for advanced intracranial atherosclerotic disease, predominantly involving the vertebrobasilar junction.  She has presented on multiple occasions over the past 7 years or so with brainstem/posterior fossa strokes and basilar occlusions.  She has always made a significant recovery following intervention previously.     She presented to the emergency department on 6/13/2018 with a 2 day history of progressive weakness and slurred speech.  Given her sub--acute presentation, she was not a candidate for IV TPA therapy, but MR angiography demonstrated reocclusion of her basilar artery.  Given the uniformly poor prognosis, she was taken for emergent neuro intervention with successful restoration of flow within the basilar artery with intra-arterial TPA/thrombectomy, as well as placement of a 2.25 mm Xience drug-eluting stent at a critical re-stenosis involving the right vertebrobasilar junction (recurrent stenosis, site of  prior angioplasty).     Unfortunately, Ms. Harmon did suffer new ischemic strokes related to her most recent event.  However, MRI demonstrates these are tiny/punctate infarcts involving her right posterolateral medulla, prashant, and left cerebellum.      She continues to use to have dysarthria (but not aphasia), and weakness on the right side of her body.  Her diplopia has resolved.  Given the small nature of her infarcts on MRI, I am optimistic for a meaningful recovery.  She is yet to pass her dysphasia screening, and at this point, I think it's reasonable to consult surgery to evaluate for PEG tube placement.

## 2018-06-19 NOTE — CONSULTS
Patient Name:  Jessy Harmon  YOB: 1945  [id number]       Patient Care Team:  Milvia Frye MD as PCP - General (Family Medicine)      General Surgery Consult Note     Date of Consultation: 06/19/18    Consulting Physician - Светлана De La Torre, DO    Reason for Consult - Feeding difficulty    Subjective     I have been asked to see  Jessy Harmon , a 72 y.o. female in consultation for feeding difficulty.  Ms. Harmon was admitted to our hospital on 6/13/2018 with a stroke related to a reocclusion of her basilar artery.  She has some dysarthria and weakness on the right side of her body.  She is unable tolerate by mouth diet, we've been asked to see her for long-term feeding access.  She is currently being fed through a nasal enteral tube.  She reports no previous feeding tubes to her knowledge.      Allergy: No Known Allergies    Medications:    amLODIPine 10 mg Oral Q24H   aspirin 81 mg Oral Daily   atorvastatin 80 mg Oral Nightly   [START ON 6/20/2018] ceFAZolin 2 g Intravenous Once   clopidogrel 75 mg Oral Daily   insulin regular 0-7 Units Subcutaneous Q6H   metoprolol tartrate 12.5 mg Oral Q12H   PRO-STAT 1 packet Nasogastric Daily       niCARdipine 5-15 mg/hr Last Rate: Stopped (06/18/18 0203)   phenylephrine (BALA-SYNEPHRINE) 50 mg/250 (0.2 mg/mL) infusion 0.5-3 mcg/kg/min      No current facility-administered medications on file prior to encounter.      Current Outpatient Prescriptions on File Prior to Encounter   Medication Sig   • aspirin 81 MG EC tablet Take 81 mg by mouth Daily.   • atorvastatin (LIPITOR) 80 MG tablet Take 1 tablet by mouth Every Night. (Patient taking differently: Take 40 mg by mouth Every Night.)   • clopidogrel (PLAVIX) 75 MG tablet Take 1 tablet by mouth Daily.       PMHx:   Patient Active Problem List   Diagnosis   • CVA (cerebral vascular accident)   • Hyperlipidemia   • Hypertension   • Chronic kidney disease   • Stroke- History   • Atherosclerotic cerebrovascular  "disease   • Vertebrobasilar artery stenosis   • Cerebrovascular accident (CVA), basilar artery occlusion, s/p TPA/thrombectomy/stenting       Past Surgical History:  Past Surgical History:   Procedure Laterality Date   • CEREBRAL ANGIOGRAM     • CEREBRAL ANGIOGRAM N/A 5/22/2018    Procedure: Cerebral angiogram;  Surgeon: Gilberto Sears MD;  Location:  [facility];  Service: Interventional Radiology   • CORONARY ARTERY BYPASS GRAFT     • INTERVENTIONAL RADIOLOGY PROCEDURE Bilateral 6/13/2018    Procedure: Carotid Cerebral Angiogram;  Surgeon: Gilberto Sears MD;  Location:  [facility];  Service: Interventional Radiology   • INTRACRANIAL ARTERY ANGIOPLASTY           Family History: Noncontributory     Social History:     Tobacco use: None     EtOH use : None    Illicit drug use: None      Review of Systems        Constitutional: No fevers, chills or malaise   Eyes: Denies visual changes    Cardiovascular: Denies chest pain, palpitations   Pulmonary: Denies cough or shortness of breath   Abdominal/ GI: See HPI    Genitourinary: Denies dysuria or hematuria   Musculoskeletal: Denies any but chronic joint aches, pains or deformities   Psychiatric: No recent mood changes   Neurologic: No paresthesias or loss of function          Objective     Physical Exam:      Vital Signs  /94 (BP Location: Left arm, Patient Position: Lying)   Pulse 71   Temp 98.1 °F (36.7 °C) (Axillary)   Resp 20   Ht 165.1 cm (65\")   Wt 71.2 kg (157 lb)   SpO2 93%   BMI 26.13 kg/m²     Intake/Output Summary (Last 24 hours) at 06/19/18 1717  Last data filed at 06/19/18 1400   Gross per 24 hour   Intake             2177 ml   Output             1660 ml   Net              517 ml         Physical Exam:    Head: Normocephalic, atraumatic.   Eyes: Pupils equal, round, react to light and accomodation.   Mouth: Oral mucosa without lesions,   Neck: No masses, lymphadenopathy or carotid bruits bilaterally   CV: " Rhythm  and rate regular , no  murmurs, rubs or gallops  Lungs: Clear  to auscultation bilaterally   Abdomen: Bowel sounds positive  , soft, obese, nontender  Groin : No obvious hernias bilaterally   Extremities:  No cyanosis, clubbing or edema bilaterally   Lymphatics: No abnormal lymphadenopathy appreciated   Neurologic: Weakness of the right side, with dysarthria      Results Review: I have personally reviewed all of the lab and imaging results available at this time.        Assessment/Plan     Assessment and Plan:    Hospital Problem List     * (Principal)Cerebrovascular accident (CVA), basilar artery occlusion, s/p TPA/thrombectomy/stenting - Plan for EGD /PEG tomorrow for dysphagia. Discussed with the patient who agrees. The plan is for repeat FEES tomorrow morning, and if still unsafe for PO, will plan for PEG at noon.    Hyperlipidemia        Hypertension        Chronic kidney disease    Stroke- History    Overview Signed 5/22/2018  1:40 PM by KENIA Wright in September 2011 and Dec 2011                       I discussed the patients findings and my recommendations with the patient and/or family, as well as the primary team     Eliu Coles MD  06/19/18  5:17 PM        Please note that portions of this note were completed with a voice recognition program. Efforts were made to edit the dictations, but occasionally words are mistranscribed.

## 2018-06-19 NOTE — PLAN OF CARE
Problem: Patient Care Overview  Goal: Plan of Care Review  Outcome: Ongoing (interventions implemented as appropriate)  Pt comfortable and resting well this evening. Will continue to monitor.   Goal: Discharge Needs Assessment  Outcome: Ongoing (interventions implemented as appropriate)      Problem: Skin Injury Risk (Adult)  Goal: Identify Related Risk Factors and Signs and Symptoms  Outcome: Ongoing (interventions implemented as appropriate)      Problem: Stroke (Ischemic) (Adult)  Goal: Signs and Symptoms of Listed Potential Problems Will be Absent, Minimized or Managed (Stroke)  Outcome: Ongoing (interventions implemented as appropriate)

## 2018-06-19 NOTE — THERAPY TREATMENT NOTE
Acute Care - Physical Therapy Treatment Note  [facility]     Patient Name: Jessy Harmon  : 1945  MRN: [id number]  Today's Date: 2018  Onset of Illness/Injury or Date of Surgery: 18  Date of Referral to PT: 18  Referring Physician: MD Renu    Admit Date: 2018    Visit Dx:    ICD-10-CM ICD-9-CM   1. Cerebrovascular accident (CVA), unspecified mechanism I63.9 434.91   2. Vertebrobasilar artery stenosis I65.1 433.30    I65.09    3. Dysarthria R47.1 784.51   4. Weakness of left lower extremity R29.898 729.89   5. Impaired functional mobility, balance, gait, and endurance Z74.09 V49.89   6. Oropharyngeal dysphagia R13.12 787.22   7. Impaired mobility and ADLs Z74.09 799.89     Patient Active Problem List   Diagnosis   • CVA (cerebral vascular accident)   • Hyperlipidemia   • Hypertension   • Chronic kidney disease   • Stroke- History   • Atherosclerotic cerebrovascular disease   • Vertebrobasilar artery stenosis   • Cerebrovascular accident (CVA), basilar artery occlusion, s/p TPA/thrombectomy/stenting       Therapy Treatment          Rehabilitation Treatment Summary     Row Name 18 1410             Treatment Time/Intention    Discipline (P)  physical therapist  -JIL      Document Type (P)  therapy note (daily note)  -JIL      Subjective Information (P)  complains of;weakness;fatigue;pain  -JIL      Mode of Treatment (P)  physical therapy  -JIL      Therapy Frequency (OT Eval) (P)  daily  -JIL      Patient Effort (P)  adequate  -JIL      Comment (P)  Pt reports c/o of increased fatigue and pain in RUE this session.  -JIL      Existing Precautions/Restrictions (P)  fall;other (see comments)   NG tube  -JIL      Recorded by [JIL] Reid Mancilla, PT Student 18 1446      Row Name 18 1410             Vital Signs    Pre Systolic BP Rehab (P)  145  -JIL      Pre Treatment Diastolic BP (P)  92  -JIL      Post Systolic BP Rehab (P)  138  -JIL      Post Treatment Diastolic BP (P)  92  -JIL       Pretreatment Heart Rate (beats/min) (P)  78  -JIL      Posttreatment Heart Rate (beats/min) (P)  74  -JIL      Pre SpO2 (%) (P)  94  -JIL      O2 Delivery Pre Treatment (P)  room air  -JIL      Post SpO2 (%) (P)  94  -JIL      O2 Delivery Post Treatment (P)  room air  -JIL      Pre Patient Position (P)  Sitting  -JIL      Intra Patient Position (P)  Sitting  -JIL      Post Patient Position (P)  Sitting  -JIL      Recorded by [JIL] Reid Mancilla, PT Student 06/19/18 1446      Row Name 06/19/18 1410             Cognitive Assessment/Intervention- PT/OT    Affect/Mental Status (Cognitive) (P)  low arousal/lethargic  -JIL      Orientation Status (Cognition) (P)  oriented to;person  -JIL      Follows Commands (Cognition) (P)  follows one step commands;50-74% accuracy;delayed response/completion;increased processing time needed;initiation impaired;physical/tactile prompts required;verbal cues/prompting required;repetition of directions required  -JIL      Cognitive Function (Cognitive) (P)  safety deficit  -JIL      Safety Deficit (Cognitive) (P)  moderate deficit;ability to follow commands;awareness of need for assistance;insight into deficits/self awareness;safety precautions awareness;safety precautions follow-through/compliance  -JIL      Personal Safety Interventions (P)  fall prevention program maintained;gait belt;nonskid shoes/slippers when out of bed  -JIL      Recorded by [JIL] Reid Mancilla, PT Student 06/19/18 1446      Row Name 06/19/18 1410             Safety Issues, Functional Mobility    Safety Issues Affecting Function (Mobility) (P)  ability to follow commands;awareness of need for assistance;insight into deficits/self awareness;safety precaution awareness;safety precautions follow-through/compliance;sequencing abilities  -JLI      Impairments Affecting Function (Mobility) (P)  balance;cognition;coordination;endurance/activity tolerance;pain;strength  -JIL      Comment, Safety Issues/Impairments (Mobility) (P)  Yessica  toward R and req frequent re-orientation to midline. Has difficulty with GMC of RUE/RLE, but is improving.  -JIL      Recorded by [JIL] Reid Mancilla, PT Student 06/19/18 1446      Row Name 06/19/18 1410             Bed Mobility Assessment/Treatment    Comment (Bed Mobility) (P)  [facility] upon arrival.  -JIL      Recorded by [JIL] Reid Mancilla, PT Student 06/19/18 1446      Row Name 06/19/18 1410             Transfer Assessment/Treatment    Comment (Transfers) (P)  Deferred this session due to pt difficulty sitting EOB without Max A x 2; pt had decreased ability to activate trunk musculature to orient to midline; frequently leaned to R throughout session, unable to correct without Max A assist.  -JIL      Recorded by [JIL] Reid Mancilla, PT Student 06/19/18 1446      Row Name 06/19/18 1410             Gait/Stairs Assessment/Training    [place] Level (Gait) (P)  not tested  -JIL      Comment (Gait/Stairs) (P)  Deferred this session due to pt functional status.  -JIL      Recorded by [JIL] Reid Mancilla, PT Student 06/19/18 1446      Row Name 06/19/18 1410             General ROM    GENERAL ROM COMMENTS (P)  RLE grossly 2+5, LLE grossly 3/5  -JIL      Recorded by [JIL] Reid Mancilla, PT Student 06/19/18 1446      Row Name 06/19/18 1410             Therapeutic Exercise    86028 - PT Therapeutic Exercise Minutes (P)  8  -JIL      33917 - PT Therapeutic Activity Minutes (P)  10  -JB2      Recorded by [JIL] Reid Mancilla, PT Student 06/19/18 1446  [JB2] Reid Mancilla, PT Student 06/19/18 1451      Row Name 06/19/18 1410             Lower Extremity Seated Therapeutic Exercise    Performed, Seated Lower Extremity (Therapeutic Exercise) (P)  hip flexion/extension;LAQ (long arc quad), knee extension;ankle dorsiflexion/plantarflexion  -JIL      Exercise Type, Seated Lower Extremity (Therapeutic Exercise) (P)  AROM (active range of motion);AAROM (active assistive range of motion)  -JIL      Sets/Reps Detail, Seated Lower Extremity  (Therapeutic Exercise) (P)  BLE 10x each  -JIL      Comment, Seated Lower Extremity (Therapeutic Exercise) (P)  AAROM for R knee hip flexion.  -JIL      Recorded by [JIL] Reid Mancilla, PT Student 06/19/18 1446      Row Name 06/19/18 1410             Static Sitting Balance    Level of [place] (Unsupported Sitting, Static Balance) (P)  maximal assist, 25 to 49% patient effort  -JIL      Sitting Position (Unsupported Sitting, Static Balance) (P)  sitting in chair  -JIL      Time Able to Maintain Position (Unsupported Sitting, Static Balance) (P)  more than 5 minutes  -JIL      Comment (Unsupported Sitting, Static Balance) (P)  Pt demonstrated pronounced R lateral lean this session. Req continuous VC/TC to correct; pt demonstrated difficulty initiating and maintaining midline position. Req frequent cuing to re-orient to midline position. Worked on lateral WS and reaching outside LILLIE with LUE. Significant LOB to R side.  -JIL      Recorded by [JIL] Reid Mancilla, PT Student 06/19/18 1446      Row Name 06/19/18 1410             Positioning and Restraints    Pre-Treatment Position (P)  sitting in chair/recliner  -JIL      Post Treatment Position (P)  chair  -JIL      In Chair (P)  notified nsg;sitting;call light within reach;encouraged to call for assist;exit alarm on;RUE elevated;LUE elevated;waffle cushion;on mechanical lift sling  -JIL      Recorded by [JIL] Reid Mancilla, PT Student 06/19/18 1446      Row Name 06/19/18 1410             Pain Scale: Numbers Pre/Post-Treatment    Pain Scale: Numbers, Pretreatment (P)  5/10  -JIL      Pain Scale: Numbers, Post-Treatment (P)  5/10  -JIL      Pain Location - Side (P)  Right  -JIL      Pain Location (P)  hand  -JIL      Pre/Post Treatment Pain Comment (P)  Pt c/o of RUE pain with movement. Tolerated session well with no increase in pn.  -JIL      Pain Intervention(s) (P)  Repositioned;Ambulation/increased activity;Rest  -JIL      Recorded by [JIL] Reid Mancilla, PT Student 06/19/18 0677       Row Name 06/19/18 1410             Coping    Observed Emotional State (P)  calm;cooperative  -JIL      Verbalized Emotional State (P)  acceptance  -JIL      Recorded by [JIL] Reid Mancilla, PT Student 06/19/18 1446      Row Name 06/19/18 1410             Plan of Care Review    Plan of Care Reviewed With (P)  patient  -JIL      Recorded by [JIL] Reid Mancilla, PT Student 06/19/18 1446      Row Name 06/19/18 1410             Outcome Summary/Treatment Plan (PT)    Daily Summary of Progress (PT) (P)  progress towards functional goals is fair  -JIL      Recorded by [JIL] Reid Mancilla, PT Student 06/19/18 1446        User Key  (r) = Recorded By, (t) = Taken By, (c) = Cosigned By    Initials Name Effective Dates Discipline    JIL Mancilla, PT Student 05/15/18 -  PT                     Physical Therapy Education     Title: PT OT SLP Therapies (Active)     Topic: Physical Therapy (Active)     Point: Mobility training (Active)    Learning Progress Summary     Learner Status Readiness Method Response Comment Documented by    Patient Active Acceptance E NR  JIL 06/19/18 1410     Active Acceptance E NR  JIL 06/18/18 1401     Done Acceptance E,D VU,NR   06/15/18 1007     Active Acceptance E,D NR   06/14/18 1529    Family Active Acceptance E,D NR   06/14/18 1529          Point: Home exercise program (Active)    Learning Progress Summary     Learner Status Readiness Method Response Comment Documented by    Patient Active Acceptance E NR  JIL 06/19/18 1410     Active Acceptance E NR  JIL 06/18/18 1401     Done Acceptance E,D VU,NR   06/15/18 1007     Active Acceptance E,D NR   06/14/18 1529    Family Active Acceptance E,D NR   06/14/18 1529          Point: Body mechanics (Active)    Learning Progress Summary     Learner Status Readiness Method Response Comment Documented by    Patient Active Acceptance E NR  JIL 06/19/18 1410     Active Acceptance E NR  JIL 06/18/18 1401     Done Acceptance E,D VU,NR   06/15/18 1007      Active Acceptance E,D NR   06/14/18 1529    Family Active Acceptance E,D NR   06/14/18 1529          Point: Precautions (Active)    Learning Progress Summary     Learner Status Readiness Method Response Comment Documented by    Patient Active Acceptance E NR   06/19/18 1410     Active Acceptance E NR  JIL 06/18/18 1401     Done Acceptance E,D VU,NR  LS 06/15/18 1007     Active Acceptance E,D NR  LS 06/14/18 1529    Family Active Acceptance E,D NR   06/14/18 1529                      User Key     Initials Effective Dates Name Provider Type Discipline     06/19/15 -  Piedad Tobias, PT Physical Therapist PT     05/15/18 -  Reid Mancilla, PT Student PT Student PT                    PT Recommendation and Plan     Outcome Summary/Treatment Plan (PT)  Daily Summary of Progress (PT): (P) progress towards functional goals is fair  Plan of Care Reviewed With: (P) patient  Progress: (P) no change  Outcome Summary: (P) Pt demonstrated decreased functional independence this session; pt able to participate in unsupported sitting balance tasks, demonstrated significant R lateral lean that she was unable to correct for req Max A x 2. Pt with difficulty orienting to midline, depsite cuing. Continues to be limited by R-sided weakness, gross motor control, and fatigue. Will continue to progress per PT POC as pt is appropriate.          Outcome Measures     Row Name 06/19/18 1410 06/18/18 1401 06/18/18 0837       How much help from another person do you currently need...    Turning from your back to your side while in flat bed without using bedrails? (P)  2  -JIL 2  -LS (r) JIL (t) LS (c)  --    Moving from lying on back to sitting on the side of a flat bed without bedrails? (P)  2  -JIL 2  -LS (r) JIL (t) LS (c)  --    Moving to and from a bed to a chair (including a wheelchair)? (P)  1  -JIL 2  -LS (r) JIL (t) LS (c)  --    Standing up from a chair using your arms (e.g., wheelchair, bedside chair)? (P)  1  -JIL 2  -LS (r) JIL (t)  LS (c)  --    Climbing 3-5 steps with a railing? (P)  1  -JIL 1  -LS (r) JIL (t) LS (c)  --    To walk in hospital room? (P)  1  -JIL 1  -LS (r) JIL (t) LS (c)  --    AM-PAC 6 Clicks Score (P)  8  -JIL 10  -LS (r) JIL (t)  --       How much help from another is currently needed...    Putting on and taking off regular lower body clothing?  --  -- 1  -CL    Bathing (including washing, rinsing, and drying)  --  -- 1  -CL    Toileting (which includes using toilet bed pan or urinal)  --  -- 1  -CL    Putting on and taking off regular upper body clothing  --  -- 2  -CL    Taking care of personal grooming (such as brushing teeth)  --  -- 2  -CL    Eating meals  --  -- 1  -CL    Score  --  -- 8  -CL       Modified Hidalgo Scale    Modified Hidalgo Scale  --  -- 4 - Moderately severe disability.  Unable to walk without assistance, and unable to attend to own bodily needs without assistance.  -CL       Functional Assessment    Outcome Measure Options (P)  AM-PAC 6 Clicks Basic Mobility (PT)  -JIL AM-PAC 6 Clicks Basic Mobility (PT)  -LS (r) JIL (t) LS (c) AM-PAC 6 Clicks Daily Activity (OT)  -CL      User Key  (r) = Recorded By, (t) = Taken By, (c) = Cosigned By    Initials Name Provider Type    HEIDY Tobias, PT Physical Therapist    CL Radha Becerril, OT Occupational Therapist    JIL Mancilla, PT Student PT Student           Time Calculation:         PT Charges     Row Name 06/19/18 1410             Time Calculation    Start Time (P)  1410  -JIL      PT Received On (P)  06/19/18  -      PT Goal Re-Cert Due Date (P)  06/24/18  -JIL         Time Calculation- PT    Total Timed Code Minutes- PT (P)  18 minute(s)  -JIL         Timed Charges    72358 - PT Therapeutic Exercise Minutes (P)  8  -JIL      51004 - PT Therapeutic Activity Minutes (P)  10  -JIL        User Key  (r) = Recorded By, (t) = Taken By, (c) = Cosigned By    Initials Name Provider Type    JIL Mancilla, PT Student PT Student        Therapy Suggested Charges     Code    Minutes Charges    64602 (CPT®) Hc Pt Neuromusc Re Education Ea 15 Min      03544 (CPT®) Hc Pt Ther Proc Ea 15 Min 8     78108 (CPT®) Hc Gait Training Ea 15 Min      25598 (CPT®) Hc Pt Therapeutic Act Ea 15 Min 10 1    63996 (CPT®) Hc Pt Manual Therapy Ea 15 Min      43114 (CPT®) Hc Pt Iontophoresis Ea 15 Min      27373 (CPT®) Hc Pt Elec Stim Ea-Per 15 Min      04735 (CPT®) Hc Pt Ultrasound Ea 15 Min      15156 (CPT®) Hc Pt Self Care/Mgmt/Train Ea 15 Min      Total  18 1        Therapy Charges for Today     Code Description Service Date Service Provider Modifiers Qty    86105492868 HC PT THER PROC EA 15 MIN 6/18/2018 Reid Mancilla, PT Student GP 1    18081060531 HC PT THERAPEUTIC ACT EA 15 MIN 6/18/2018 Reid Mancilla, PT Student GP 1    70420535603 HC PT THERAPEUTIC ACT EA 15 MIN 6/19/2018 Reid Mancilla, PT Student GP 1          PT G-Codes  Outcome Measure Options: (P) AM-PAC 6 Clicks Basic Mobility (PT)    Reid Mancilla, PT Student  6/19/2018

## 2018-06-19 NOTE — PLAN OF CARE
Problem: Patient Care Overview  Goal: Plan of Care Review  Outcome: Ongoing (interventions implemented as appropriate)   06/19/18 1410   Plan of Care Review   Progress no change   Coping/Psychosocial   Plan of Care Reviewed With patient   OTHER   Outcome Summary Pt demonstrated decreased functional independence this session; pt able to participate in unsupported sitting balance tasks, demonstrated significant R lateral lean that she was unable to correct for req Max A x 2. Pt with difficulty orienting to midline, depsite cuing. Continues to be limited by R-sided weakness, gross motor control, and fatigue. Will continue to progress per PT POC as pt is appropriate.

## 2018-06-19 NOTE — PROGRESS NOTES
Multidisciplinary Rounds    Time: 20min  Patient Name: Jessy Harmon  Date of Encounter: 06/19/18 2:09 PM  MRN: [id number]  Admission date: 6/13/2018      Reason for visit: MDR. RD to continue to follow per protocol.     Additional information obtained during MDR: Pt tolerating tube feeding with 88% of goal volume delivered over the past 24 hrs. Per SLP FEES eval (6/18)- Continue NPO, meds alt route. Possible PEG.     Current diet: FiberSource HN at 70 ml/hr with 1 ProStat daily and free water at 10 ml/hr via NDT      Intervention:  Follow treatment plan  Care plan reviewed    Follow up:   Per protocol      Yuni Levy MS RD/ANNEMARIE CNSC  2:09 PM

## 2018-06-20 ENCOUNTER — ANCILLARY PROCEDURE (OUTPATIENT)
Dept: SPEECH THERAPY | Facility: HOSPITAL | Age: 73
End: 2018-06-20
Attending: INTERNAL MEDICINE

## 2018-06-20 VITALS
DIASTOLIC BLOOD PRESSURE: 93 MMHG | RESPIRATION RATE: 18 BRPM | BODY MASS INDEX: 26.16 KG/M2 | WEIGHT: 157 LBS | TEMPERATURE: 98.2 F | HEART RATE: 82 BPM | SYSTOLIC BLOOD PRESSURE: 157 MMHG | OXYGEN SATURATION: 95 % | HEIGHT: 65 IN

## 2018-06-20 LAB
ALBUMIN SERPL-MCNC: 3.79 G/DL (ref 3.2–4.8)
ALBUMIN/GLOB SERPL: 1.2 G/DL (ref 1.5–2.5)
ALP SERPL-CCNC: 84 U/L (ref 25–100)
ALT SERPL W P-5'-P-CCNC: 17 U/L (ref 7–40)
ANION GAP SERPL CALCULATED.3IONS-SCNC: 8 MMOL/L (ref 3–11)
APTT PPP: 29.3 SECONDS (ref 24–31)
AST SERPL-CCNC: 23 U/L (ref 0–33)
BILIRUB SERPL-MCNC: 0.8 MG/DL (ref 0.3–1.2)
BUN BLD-MCNC: 29 MG/DL (ref 9–23)
BUN/CREAT SERPL: 32.2 (ref 7–25)
CA-I SERPL ISE-MCNC: 1.39 MMOL/L (ref 1.12–1.32)
CALCIUM SPEC-SCNC: 9.9 MG/DL (ref 8.7–10.4)
CHLORIDE SERPL-SCNC: 104 MMOL/L (ref 99–109)
CO2 SERPL-SCNC: 28 MMOL/L (ref 20–31)
CREAT BLD-MCNC: 0.9 MG/DL (ref 0.6–1.3)
DEPRECATED RDW RBC AUTO: 43.7 FL (ref 37–54)
ERYTHROCYTE [DISTWIDTH] IN BLOOD BY AUTOMATED COUNT: 13.6 % (ref 11.3–14.5)
GFR SERPL CREATININE-BSD FRML MDRD: 74 ML/MIN/1.73
GLOBULIN UR ELPH-MCNC: 3.1 GM/DL
GLUCOSE BLD-MCNC: 119 MG/DL (ref 70–100)
GLUCOSE BLDC GLUCOMTR-MCNC: 107 MG/DL (ref 70–130)
GLUCOSE BLDC GLUCOMTR-MCNC: 151 MG/DL (ref 70–130)
GLUCOSE BLDC GLUCOMTR-MCNC: 151 MG/DL (ref 70–130)
GLUCOSE BLDC GLUCOMTR-MCNC: 99 MG/DL (ref 70–130)
HCT VFR BLD AUTO: 45.3 % (ref 34.5–44)
HGB BLD-MCNC: 14 G/DL (ref 11.5–15.5)
INR PPP: 1.04 (ref 0.91–1.09)
MAGNESIUM SERPL-MCNC: 2.2 MG/DL (ref 1.3–2.7)
MCH RBC QN AUTO: 27.1 PG (ref 27–31)
MCHC RBC AUTO-ENTMCNC: 30.9 G/DL (ref 32–36)
MCV RBC AUTO: 87.6 FL (ref 80–99)
PHOSPHATE SERPL-MCNC: 3.9 MG/DL (ref 2.4–5.1)
PLATELET # BLD AUTO: 286 10*3/MM3 (ref 150–450)
PMV BLD AUTO: 10 FL (ref 6–12)
POTASSIUM BLD-SCNC: 4.7 MMOL/L (ref 3.5–5.5)
PROT SERPL-MCNC: 6.9 G/DL (ref 5.7–8.2)
PROTHROMBIN TIME: 10.9 SECONDS (ref 9.6–11.5)
RBC # BLD AUTO: 5.17 10*6/MM3 (ref 3.89–5.14)
SODIUM BLD-SCNC: 140 MMOL/L (ref 132–146)
WBC NRBC COR # BLD: 12.37 10*3/MM3 (ref 3.5–10.8)

## 2018-06-20 PROCEDURE — 99231 SBSQ HOSP IP/OBS SF/LOW 25: CPT | Performed by: RADIOLOGY

## 2018-06-20 PROCEDURE — 80053 COMPREHEN METABOLIC PANEL: CPT | Performed by: INTERNAL MEDICINE

## 2018-06-20 PROCEDURE — 97535 SELF CARE MNGMENT TRAINING: CPT

## 2018-06-20 PROCEDURE — 97110 THERAPEUTIC EXERCISES: CPT

## 2018-06-20 PROCEDURE — 83735 ASSAY OF MAGNESIUM: CPT | Performed by: INTERNAL MEDICINE

## 2018-06-20 PROCEDURE — 84100 ASSAY OF PHOSPHORUS: CPT | Performed by: INTERNAL MEDICINE

## 2018-06-20 PROCEDURE — 92612 ENDOSCOPY SWALLOW (FEES) VID: CPT

## 2018-06-20 PROCEDURE — 82962 GLUCOSE BLOOD TEST: CPT

## 2018-06-20 PROCEDURE — 85730 THROMBOPLASTIN TIME PARTIAL: CPT | Performed by: SURGERY

## 2018-06-20 PROCEDURE — 85610 PROTHROMBIN TIME: CPT | Performed by: SURGERY

## 2018-06-20 PROCEDURE — 97530 THERAPEUTIC ACTIVITIES: CPT

## 2018-06-20 PROCEDURE — 85027 COMPLETE CBC AUTOMATED: CPT | Performed by: INTERNAL MEDICINE

## 2018-06-20 PROCEDURE — 82330 ASSAY OF CALCIUM: CPT | Performed by: INTERNAL MEDICINE

## 2018-06-20 PROCEDURE — 99238 HOSP IP/OBS DSCHRG MGMT 30/<: CPT | Performed by: NURSE PRACTITIONER

## 2018-06-20 RX ORDER — AMLODIPINE BESYLATE 10 MG/1
10 TABLET ORAL
Start: 2018-06-21 | End: 2018-10-03 | Stop reason: SDUPTHER

## 2018-06-20 RX ORDER — NICOTINE POLACRILEX 4 MG
15 LOZENGE BUCCAL
Start: 2018-06-20

## 2018-06-20 RX ORDER — ASPIRIN 81 MG/1
81 TABLET, CHEWABLE ORAL DAILY
Start: 2018-06-21 | End: 2018-10-03 | Stop reason: SDUPTHER

## 2018-06-20 RX ORDER — HYDRALAZINE HYDROCHLORIDE 20 MG/ML
20 INJECTION INTRAMUSCULAR; INTRAVENOUS EVERY 6 HOURS PRN
Start: 2018-06-20 | End: 2018-10-03 | Stop reason: DRUGHIGH

## 2018-06-20 RX ORDER — ONDANSETRON 2 MG/ML
4 INJECTION INTRAMUSCULAR; INTRAVENOUS EVERY 6 HOURS PRN
Start: 2018-06-20

## 2018-06-20 RX ORDER — HYDROCODONE BITARTRATE AND ACETAMINOPHEN 5; 325 MG/1; MG/1
1 TABLET ORAL EVERY 4 HOURS PRN
Start: 2018-06-20 | End: 2018-06-23

## 2018-06-20 RX ORDER — CLOPIDOGREL BISULFATE 75 MG/1
75 TABLET ORAL DAILY
Qty: 30 TABLET
Start: 2018-06-21 | End: 2019-01-02 | Stop reason: SDUPTHER

## 2018-06-20 RX ORDER — ATORVASTATIN CALCIUM 80 MG/1
80 TABLET, FILM COATED ORAL NIGHTLY
Start: 2018-06-20 | End: 2018-10-03 | Stop reason: SDUPTHER

## 2018-06-20 RX ADMIN — METOPROLOL TARTRATE 12.5 MG: 25 TABLET, FILM COATED ORAL at 08:10

## 2018-06-20 RX ADMIN — CLOPIDOGREL BISULFATE 75 MG: 75 TABLET ORAL at 08:09

## 2018-06-20 RX ADMIN — ASPIRIN 81 MG CHEWABLE TABLET 81 MG: 81 TABLET CHEWABLE at 08:10

## 2018-06-20 RX ADMIN — AMLODIPINE BESYLATE 10 MG: 10 TABLET ORAL at 08:10

## 2018-06-20 NOTE — MBS/VFSS/FEES
Acute Care - Speech Language Pathology   Swallow Initial Evaluation [facility]   Fiberoptic Endoscopic Evaluation of Swallowing (FEES)       Patient Name: Jessy Harmon  : 1945  MRN: [id number]  Today's Date: 2018  Onset of Illness/Injury or Date of Surgery: 18     Referring Physician: MD Renu      Admit Date: 2018    Visit Dx:     ICD-10-CM ICD-9-CM   1. Cerebrovascular accident (CVA), unspecified mechanism I63.9 434.91   2. Vertebrobasilar artery stenosis I65.1 433.30    I65.09    3. Dysarthria R47.1 784.51   4. Weakness of left lower extremity R29.898 729.89   5. Impaired functional mobility, balance, gait, and endurance Z74.09 V49.89   6. Oropharyngeal dysphagia R13.12 787.22   7. Impaired mobility and ADLs Z74.09 799.89     Patient Active Problem List   Diagnosis   • CVA (cerebral vascular accident)   • Hyperlipidemia   • Hypertension   • Chronic kidney disease   • Stroke- History   • Atherosclerotic cerebrovascular disease   • Vertebrobasilar artery stenosis   • Cerebrovascular accident (CVA), basilar artery occlusion, s/p TPA/thrombectomy/stenting     Past Medical History:   Diagnosis Date   • Atherosclerotic cerebrovascular disease    • Chronic kidney disease    • Hyperlipidemia    • Hypertension    • Stroke     Multiple in 2011 and Dec 2011     Past Surgical History:   Procedure Laterality Date   • CEREBRAL ANGIOGRAM     • CEREBRAL ANGIOGRAM N/A 2018    Procedure: Cerebral angiogram;  Surgeon: Gilberto Sears MD;  Location:  [facility];  Service: Interventional Radiology   • CORONARY ARTERY BYPASS GRAFT     • INTERVENTIONAL RADIOLOGY PROCEDURE Bilateral 2018    Procedure: Carotid Cerebral Angiogram;  Surgeon: Gilberto Sears MD;  Location:  [facility];  Service: Interventional Radiology   • INTRACRANIAL ARTERY ANGIOPLASTY            SWALLOW EVALUATION (last 72 hours)      SLP Adult Swallow Evaluation     Row Name 18  1000 06/18/18 1515 06/17/18 1200             Rehab Evaluation    Document Type evaluation  -AV re-evaluation  -RD evaluation  -AV      Subjective Information complains of;weakness  -AV complains of;weakness  -RD complains of;weakness  -AV      Patient Observations alert;cooperative  -AV alert;cooperative;agree to therapy  -RD alert;cooperative  -AV      Patient/Family Observations  -- No family present  -RD grandson present   -AV      Patient Effort adequate  -AV good  -RD adequate  -AV      Comment  --  -- patient reports was using head turn to the right for swallowing before admit to hospital   -AV         General Information    Patient Profile Reviewed  -- yes  -RD yes  -AV      Pertinent History Of Current Problem  -- Adm w/ CVA s/p thrombectomy/stenting. Hx of prior CVA 5/22/18. SEBASTIAN bustillo. Failed RN dys screen.   -RD  --      Current Method of Nutrition  -- NPO;nasogastric feedings  -RD NPO  -AV      Precautions/Limitations, Vision  -- vision impairment, bilaterally;vision impairment, left  -RD  --      Precautions/Limitations, Hearing  -- WFL;for purposes of eval  -RD WFL;for purposes of eval  -AV      Prior Level of Function-Communication  -- WFL  -RD  --      Prior Level of Function-Swallowing  -- no diet consistency restrictions  -RD --   patient reports was using head turn to right before admit   -AV      Plans/Goals Discussed with  -- patient;family  -RD patient;family  -AV      Barriers to Rehab  -- medically complex  -RD medically complex  -AV      Patient's Goals for Discharge  -- return to PO diet  -RD return to PO diet  -AV      Family Goals for Discharge  --  -- patient able to return to PO diet  -AV         Pain Assessment    Additional Documentation  -- Pain Scale: Word Pre/Post-Treatment (Group)  -RD Pain Scale: Numbers Pre/Post-Treatment (Group)  -AV         Pain Scale: Numbers Pre/Post-Treatment    Pain Scale: Numbers, Pretreatment  --  -- 0/10 - no pain  -AV      Pain Scale: Numbers,  Post-Treatment  --  -- 0/10 - no pain  -AV         Pain Scale: Word Pre/Post-Treatment    Pain: Word Scale, Pretreatment  -- 2 - mild pain  -RD  --      Pain: Word Scale, Post-Treatment  -- 2 - mild pain  -RD  --         Oral Motor and Function    Dentition Assessment  --  -- edentulous, does not have dentures  -AV      Secretion Management  --  -- anterior loss  -AV      Mucosal Quality  --  -- moist, healthy  -AV      Volitional Swallow  --  -- delayed  -AV      Volitional Cough  --  -- weak  -AV         Oral Musculature and Cranial Nerve Assessment    Oral Motor General Assessment  --  -- generalized oral motor weakness  -AV         General Eating/Swallowing Observations    Eating/Swallowing Skills  --  -- fed by SLP  -AV      Positioning During Eating  --  -- upright 90 degree;upright in bed  -AV      Utensils Used  --  -- spoon  -AV      Consistencies Trialed  --  -- pureed;thin liquids  -AV         Clinical Swallow Eval    Oral Prep Phase  --  -- impaired  -AV      Oral Transit  --  -- impaired  -AV      Oral Residue  --  -- impaired  -AV      Pharyngeal Phase  --  -- suspected pharyngeal impairment  -AV      Esophageal Phase  --  -- unremarkable  -AV      Clinical Swallow Evaluation Summary  --  -- Bedside eval completed this am. PAtient was coughing on secretions prior to eval. Patient with anterior loss with trials. ASked for bolus to be placed on right side of mouth and was using right head turn as patient reports was previously using compsenatory strategy before admitted for this event.  Delayed initiation. Rec FEES to further assess pharyngeal skills. PAtient in agreement.   -AV         Fiberoptic Endoscopic Evaluation of Swallowing (FEES)    Risks/Benefits Reviewed risks/benefits explained;patient;family;agreed to eval  -AV risks/benefits explained;patient;agreed to eval  -RD  --      Nasal Entry right:  -AV right:  -RD  --         Anatomy and Physiology    Velopharyngeal WFL  -AV WFL  -RD  --      Base  of Tongue asymmetrical;range reduced  -AV asymmetrical;range reduced  -RD  --      Epiglottis WFL  -AV WFL  -RD  --      Laryngeal Function Breathing symmetrical;decreased movement right  -AV decreased movement right  -RD  --      Laryngeal Function Phonation asymmetrical;decreased movement right  -AV decreased movement right  -RD  --      Laryngeal Function to Breath Hold can't sustain closure  -AV can't sustain closure  -RD  --      Secretion Rating Scale (Chevy et al. 1996) 2- secretions initially outside the vestibule but later entered the vestibule  -AV 2- secretions initially outside the vestibule but later entered the vestibule  -RD  --      Secretion Description thin;clear  -AV thin;clear;white  -RD  --      Ice Chips elicited swallow;partially cleared secretions  -AV DNA  -RD  --      Spontaneous Swallow frequency reduced   partially reduced  -AV frequency reduced;did not clear secretions  -RD  --      Sensory sensed scope  -AV sensed scope  -RD  --      Utensils Used Spoon;Cup;Straw  -AV Spoon;Cup;Straw  -RD  --      Consistencies Trialed nectar-thick liquids;thin liquids;pudding/puree;Regular textures  -AV thin liquids;pudding/puree  -RD  --         FEES Interpretation    Oral Phase prespill of liquids into pharynx  -AV increased A-P transit time;prespill of liquids into pharynx  -RD  --      Oral Phase, Comment  -- Incr'd oral prep and A-P transit w/ all consistencies 2' lingual weakness. Pre-spill of both thins and pudding 2' reduced back of tongue control.  -RD  --         Initiation of Pharyngeal Swallow    Initiation of Pharyngeal Swallow bolus in valleculae  -AV bolus in pyriform sinuses;other (see comments)   over the laryngeal surface of the epiglottis  -RD  --      Pharyngeal Phase impaired pharyngeal phase of swallowing  -AV impaired pharyngeal phase of swallowing  -RD  --      Penetration Before the Swallow  -- thin liquids;pudding/puree;secondary to reduced back of tongue control;secondary to  delayed swallow initiation or mistiming  -RD  --      Penetration During the Swallow thin liquids  -AV  --  --      Aspiration During the Swallow thin liquids  -AV thin liquids;pudding/puree;secondary to delayed swallow initiation or mistiming;secondary to reduced laryngeal elevation;secondary to reduced vestibular closure  -RD  --      Penetration After the Swallow  -- thin liquids;pudding/puree  -RD  --      Aspiration After the Swallow thin liquids  -AV thin liquids;pudding/puree;secondary to residue;in valleculae;in pyriform sinuses;in laryngeal vestibule  -RD  --      Response to Aspiration weak cough/throat clear  -AV no response, silent aspiration;inconsistent response;weak cough/throat clear  -RD  --      Rosenbek's Scale thin:;8-->Level 8  -AV thin:;pudding/puree:;8-->Level 8  -RD  --      Residue thin liquids  -AV thin liquids;mixed consistency;valleculae;pyriform sinuses;posterior pharyngeal wall;laryngeal vestibule;secondary to reduced base of tongue retraction;secondary to reduced posterior pharyngeal wall stripping;secondary to reduced laryngeal elevation;secondary to reduced hyolaryngeal excursion  -RD  --      Response to Residue  -- unable to clear residue;with spontaneous subsequent swallow;with cued swallow;with compensatory maneuver (see comments)  -RD  --      Attempted Compensatory Maneuvers bolus size;bolus presentation style;head turn to right;additional subsequent swallow  -AV bolus size;bolus presentation style;chin tuck;head turn to right;multiple swallows;alternate liquids/solids  -RD  --      Response to Attempted Compensatory Maneuvers  -- did not prevent aspiration;did not reduce residue  -RD  --      Pharyngeal Phase, Comment  -- Severe pharyngeal dysphagia. Penetration before/during the swallow 2' delayed swallow intiation. Aspiration during/after the swallow 2' impaired timing, decr'd vestibular closure, and reduced elevation/excursion. Moderate-severe diffuse pharyngeal residue (>  on R side, especially R pyriform). Attempted multiple compensations: including head turn R, chin tuck, throat clear after, etc., however these were unsuccessful to eliminate aspiration consistently. Aspiration on this exam was mostly silent. Occasional sensation w/ throat clear. Still not safe for any PO at this time. Continue dysphagia treatment.   -RD  --      FEES Summary FEES repeated this am:  Patient continued to demonstrate no movement of right TVC, FVC, and arytenoid.  PAtient with secretions worse on right, keofeed impeding swallowing and residue. Prespill with all trials to upper rim of epiglottis however was able to initiate a swallow.  Aspiration of thins during swallow 2' decresaed closure with continued aspiration after with thins from residue remaining in vestibule. Eventual response, however weak and only partially cleared material.  No pen/asp with nectar, pudding, and solids.  PAtient at high risk for aspiration 2' decrease VC movement and secretions. Patient would benefit from removal of keofeed for feeding safety, spoke with MD and RN.  Patient understands risk for aspiration and wishes to not have PEG placed at this time. Rec level 4 and nectar, small drinks and bites, upright to 90* with all PO and remain upright for ~20 minutes after.  Patient needs dentures in for all PO. Will cont dys tx.   -AV  --  --         Clinical Impression    SLP Swallowing Diagnosis moderate;pharyngeal dysfunction  -AV severe;pharyngeal dysfunction;moderate;oral dysfunction  -RD oral dysfunction;suspected pharyngeal dysfunction  -AV      Functional Impact risk of aspiration/pneumonia  -AV risk of aspiration/pneumonia;risk of malnutrition;risk of dehydration  -RD risk of aspiration/pneumonia  -AV      Rehab Potential/Prognosis, Swallowing adequate, monitor progress closely  -AV adequate, monitor progress closely  -RD adequate, monitor progress closely  -AV      Criteria for Skilled Therapeutic Interventions Met  demonstrates skilled criteria  -AV demonstrates skilled criteria  -RD demonstrates skilled criteria  -AV         Recommendations    Therapy Frequency (Swallow) 5 days per week  -AV 5 days per week  -RD  --      Predicted Duration Therapy Intervention (Days) until discharge  -AV until discharge  -RD  --      SLP Diet Recommendation mechanical soft with no mixed consistencies;nectar thick liquids  -AV NPO  -RD NPO  -AV      Recommended Diagnostics  --  -- FEES  -AV      Recommended Precautions and Strategies upright posture during/after eating;small bites of food and sips of liquid  -AV other (see comments)   Oral care BID and PRN, aspiration precautions  -RD  --      SLP Rec. for Method of Medication Administration meds whole;with pudding or applesauce  -AV meds via alternate route  -RD meds via alternate route  -AV      Monitor for Signs of Aspiration yes  -AV  --  --      Anticipated Dischage Disposition inpatient rehabilitation facility  -AV inpatient rehabilitation facility;anticipate therapy at next level of care  -RD inpatient rehabilitation facility  -AV        User Key  (r) = Recorded By, (t) = Taken By, (c) = Cosigned By    Initials Name Effective Dates    AV Sita Mercado, MS CCC-SLP 04/03/18 -     RD Sary Florian, MS CCC-SLP 04/03/18 -         EDUCATION  The patient has been educated in the following areas:   Dysphagia (Swallowing Impairment) Oral Care/Hydration.    SLP Recommendation and Plan  SLP Swallowing Diagnosis: moderate, pharyngeal dysfunction  SLP Diet Recommendation: mechanical soft with no mixed consistencies, nectar thick liquids  Recommended Precautions and Strategies: upright posture during/after eating, small bites of food and sips of liquid     Monitor for Signs of Aspiration: yes     Criteria for Skilled Therapeutic Interventions Met: demonstrates skilled criteria  Anticipated Dischage Disposition: inpatient rehabilitation facility  Rehab Potential/Prognosis, Swallowing:  adequate, monitor progress closely  Therapy Frequency (Swallow): 5 days per week  Predicted Duration Therapy Intervention (Days): until discharge       Plan of Care Reviewed With: patient, grandchild(j carlos)  Plan of Care Review  Plan of Care Reviewed With: patient, grandchild(j carlos)  Progress:  (FEES )          SLP GOALS     Row Name 06/18/18 1515             Oral Nutrition/Hydration Goal 1 (SLP)    Oral Nutrition/Hydration Goal 1, SLP LTG: Pt will improve swallowing skills to begin to take some PO safely w/ 100% accuracy w/o cues.   -RD      Time Frame (Oral Nutrition/Hydration Goal 1, SLP) by discharge  -RD      Progress/Outcomes (Oral Nutrition/Hydration Goal 1, SLP) continuing progress toward goal  -RD         Oral Nutrition/Hydration Goal 2 (SLP)    Oral Nutrition/Hydration Goal 2, SLP Pt will tolerate ice chip/thin trials post oral care w/ no overt s/s of aspiration w/ 70% accuracy w/o cues.   -RD      Time Frame (Oral Nutrition/Hydration Goal 2, SLP) short term goal (STG);by discharge  -RD      Progress/Outcomes (Oral Nutrition/Hydration Goal 2, SLP) continuing progress toward goal  -RD         Labial Strengthening Goal 1 (SLP)    Activity (Labial Strengthening Goal 1, SLP) increase labial tone;increase labial sensation/afferent drive  -RD      Increase Labial Tone labial resistance exercises  -RD      Increase Labial Sensation/Afferent Drive swallow trials  -RD      [place]/Accuracy (Labial Strengthening Goal 1, SLP) with minimal cues (75-90% accuracy)  -RD      Time Frame (Labial Strengthening Goal 1, SLP) short term goal (STG);by discharge  -RD      Progress/Outcomes (Labial Strengthening Goal 1, SLP) goal ongoing  -RD         Lingual Strengthening Goal 1 (SLP)    Activity (Lingual Strengthening Goal 1, SLP) increase lingual tone/sensation/control/coordination/movement;increase tongue back strength;increase buccal tension or tone  -RD      Increase Lingual Tone/Sensation/Control/Coordination/Movement  lingual resistance exercises  -RD      Increase Buccal Tension or Tone swallow trials  -RD      Increase Tongue Back Strength lingual resistance exercises  -RD      [place]/Accuracy (Lingual Strengthening Goal 1, SLP) with minimal cues (75-90% accuracy)  -RD      Time Frame (Lingual Strengthening Goal 1, SLP) short term goal (STG);by discharge  -RD      Progress/Outcomes (Lingual Strengthening Goal 1, SLP) goal ongoing  -RD         Pharyngeal Strengthening Exercise Goal 1 (SLP)    Activity (Pharyngeal Strengthening Goal 1, SLP) increase timing;increase superior movement of the hyolaryngeal complex;increase anterior movement of the hyolaryngeal complex;increase closure at entrance to airway/closure of airway at glottis;increase tongue base retraction  -RD      Increase Timing prepping - 3 second prep or suck swallow or 3-step swallow  -RD      Increase Superior Movement of the Hyolaryngeal Complex effortful pitch glide (falsetto + pharyngeal squeeze)  -RD      Increase Anterior Movement of the Hyolaryngeal Complex chin tuck against resistance (CTAR)  -RD      Increase Closure at Entrance to Airway/Closure of Airway at Glottis super-supraglottic swallow  -RD      Increase Tongue Base Retraction hard effortful swallow  -RD      [place]/Accuracy (Pharyngeal Strengthening Goal 1, SLP) with minimal cues (75-90% accuracy)  -RD      Time Frame (Pharyngeal Strengthening Goal 1, SLP) short term goal (STG);by discharge  -RD      Progress/Outcomes (Pharyngeal Strengthening Goal 1, SLP) goal ongoing  -RD        User Key  (r) = Recorded By, (t) = Taken By, (c) = Cosigned By    Initials Name Provider Type    ARACELI Florian MS CCC-SLP Speech and Language Pathologist               Time Calculation:         Time Calculation- SLP     Row Name 06/20/18 1021             Time Calculation- SLP    SLP Start Time 1000  -AV      SLP Received On 06/20/18  -AV        User Key  (r) = Recorded By, (t) = Taken By, (c) = Cosigned  By    Initials Name Provider Type    AV Sita Mercado MS CCC-SLP Speech and Language Pathologist          Therapy Charges for Today     Code Description Service Date Service Provider Modifiers Qty    [id number] HC ST FIBEROPTIC ENDO EVAL SWALL 6 6/20/2018 Sita Mercado MS CCC-SLP GN 1               Sita Mercado MS CCC-SLP  6/20/2018

## 2018-06-20 NOTE — PROGRESS NOTES
NAME: LIDA MAZARIEGOS  : 1945  PCP: Milvia Frye MD  ADMITTING PHYSICIAN: Светлана De La Torre DO    DATE OF ADMISSION:  2018  DATE OF SERVICE: 2018    HOSPITAL DAY:  7 days    HISTORY OF PRESENT ILLNESS:  72 y.o. female well known to the Neurointerventional service, having been treated on multiple occasions for advanced intracranial atherosclerotic disease (ICAD) and prior basilar occlusion X 2.  She's had angioplasty/stent placement involving her intracranial right vertebral artery greater than 5 years ago ([employer]).  She did very well for several years, but presented to [facility] in mid-May 2018 for recurrent ICAD and basilar thrombosis.  She was transferred to , and underwent emergent neurointervention with IA tPA/thrombectomy and angioplasty of critical stenosis of right VB junction on 18.  This represented progression of disease, as her prior stents were widely patent.  She was restarted on Plavix/ASA regimen, and made an excellent recovery.       She re-presented to  emergency department on 2018 with a 2 day history of worsening speech difficulties and worsening left lower extremity weakness. Given her sub--acute presentation, with symptom onset occurring 2 days prior, she was deemed not a candidate for IV TPA therapy.  She did undergo MR angiography of the head, which demonstrated reocclusion of her basilar artery.  Given concern for further progression of symptoms and worsening brainstem strokes, she was taken for emergent neuro intervention.    REVIEW OF IMAGING:  No new imaging.    LABS:  Lab Results   Component Value Date    WBC 12.37 (H) 2018    HGB 14.0 2018    HCT 45.3 (H) 2018    MCV 87.6 2018     2018     Lab Results   Component Value Date    GLUCOSE 119 (H) 2018    CALCIUM 9.9 2018     2018    K 4.7 2018    CO2 28.0 2018      06/20/2018    BUN 29 (H) 06/20/2018    CREATININE 0.90 06/20/2018    EGFRIFAFRI 74 06/20/2018    BCR 32.2 (H) 06/20/2018    ANIONGAP 8.0 06/20/2018     Lab Results   Component Value Date    HGBA1C 6.10 (H) 06/14/2018     Lab Results   Component Value Date    CHOL 112 06/18/2018    TRIG 59 06/18/2018    HDL 45 06/14/2018     06/14/2018       CURRENT MEDS:  Current Facility-Administered Medications   Medication Dose Route Frequency Provider Last Rate Last Dose   • amLODIPine (NORVASC) tablet 10 mg  10 mg Oral Q24H Raul Melton MD   10 mg at 06/20/18 0810   • aspirin chewable tablet 81 mg  81 mg Oral Daily Gilberto Sears MD   81 mg at 06/20/18 0810   • atorvastatin (LIPITOR) tablet 80 mg  80 mg Oral Nightly Gilberto Sears MD   80 mg at 06/19/18 2033   • ceFAZolin in dextrose (ANCEF) IVPB solution 2 g  2 g Intravenous Once Eliu Coles MD       • clopidogrel (PLAVIX) tablet 75 mg  75 mg Oral Daily Gilberto Sears MD   75 mg at 06/20/18 0809   • dextrose (D50W) solution 25 g  25 g Intravenous Q15 Min PRN Raul Melton MD       • dextrose (GLUTOSE) oral gel 15 g  15 g Oral Q15 Min PRN Raul Melton MD       • glucagon (human recombinant) (GLUCAGEN DIAGNOSTIC) injection 1 mg  1 mg Subcutaneous PRN Raul Melton MD       • hydrALAZINE (APRESOLINE) injection 20 mg  20 mg Intravenous Q6H PRN KENIA Mann   20 mg at 06/19/18 2205   • HYDROcodone-acetaminophen (NORCO) 5-325 MG per tablet 1 tablet  1 tablet Oral Q4H PRN Gilberto Sears MD   1 tablet at 06/14/18 2025   • insulin regular (humuLIN R,novoLIN R) injection 0-7 Units  0-7 Units Subcutaneous Q6H Michell Guerra [facility]   2 Units at 06/19/18 5549   • metoprolol tartrate (LOPRESSOR) half tablet 12.5 mg  12.5 mg Oral Q12H Raul Melton MD   12.5 mg at 06/20/18 0810   • niCARdipine (CARDENE-IV) 40 mg/200 mL (0.2 mg/mL) in 0.9% NaCl infusion  5-15 mg/hr Intravenous Titrated Светлана V. Case, DO    Stopped at 06/20/18 0700   • ondansetron (ZOFRAN) injection 4 mg  4 mg Intravenous Q6H PRN Gilberto Sears MD   4 mg at 06/14/18 0924   • phenylephrine (BALA-SYNEPHRINE) 50 mg in sodium chloride 0.9 % 250 mL (0.2 mg/mL) infusion  0.5-3 mcg/kg/min Intravenous Titrated Gilberto Sears MD       • potassium chloride (KLOR-CON) packet 40 mEq  40 mEq Oral PRN KENIA Britt   40 mEq at 06/15/18 1205   • PRO-STAT 1 packet  1 packet Nasogastric Daily Danette Cuevas, RD   1 packet at 06/19/18 0832   • sodium chloride 0.9 % flush 1-10 mL  1-10 mL Intravenous PRN Gilberto Sears MD           PHYSICAL EXAM:  Vitals:    06/20/18 1000   BP: 137/91   Pulse: 64   Resp: 16   Temp:    SpO2: 96%      She remains dysarthric, but not aphasic.  She has 1-2/5 strength in the right lower extremity and 2/5 strength in the right upper extremity.  Her right  strength continues to improve.  Her diplopia has resolved.  Today, she did pass her dysphasia screening (with exception of thin liquids).    ASSESSMENT/PLAN:  Ms. Harmon is a 72 y.o. female well known to the neuro interventional service, having been treated on multiple occasions for advanced intracranial atherosclerotic disease, predominantly involving the vertebrobasilar junction.  She has presented on multiple occasions over the past 7 years or so with brainstem/posterior fossa strokes and basilar occlusions.  She has always made a significant recovery following intervention previously.     She presented to the emergency department on 6/13/2018 with a 2 day history of progressive weakness and slurred speech.  Given her sub--acute presentation, she was not a candidate for IV TPA therapy, but MR angiography demonstrated reocclusion of her basilar artery.  Given the uniformly poor prognosis, she was taken for emergent neuro intervention with successful restoration of flow within the basilar artery with intra-arterial TPA/thrombectomy, as well as placement of a 2.25 mm Xience  drug-eluting stent at a critical re-stenosis involving the right vertebrobasilar junction (recurrent stenosis, site of prior angioplasty).     Unfortunately, Ms. Harmon did suffer new ischemic strokes related to her most recent event.  However, MRI demonstrates these are tiny/punctate infarcts involving her right posterolateral medulla, prashant, and left cerebellum.       She continues to use to have dysarthria (but not aphasia), and weakness on the right side of her body.  Her diplopia has resolved.  Given the small nature of her infarcts on MRI, I am optimistic for a meaningful recovery.   She did pass her dysphasia screening today (with exception of clear liquids), and thus we will hold off on PEG tube for now.  She will need to remain on lifelong dual antiplatelet therapy and statin therapy, but is okay for transfer to [facility] from a neuro interventional standpoint.  I plan on following up with her in the neuro interventional clinic in 3-4 weeks time.

## 2018-06-20 NOTE — PLAN OF CARE
Problem: Patient Care Overview  Goal: Plan of Care Review  Outcome: Ongoing (interventions implemented as appropriate)   06/20/18 1039   Plan of Care Review   Progress improving   Coping/Psychosocial   Plan of Care Reviewed With patient;grandchild(j carlos)   OTHER   Outcome Summary Pt demonstrated improved duration and quality of EOB sitting this session; pt req frequent cuing to re-orient to midline initially, but was able to correct and sustain midline for ~1 min. Performed SPT bed-chair with Max A x 2. Pt continues to be limited by BLE weakness (R>L) and decreased gross motor control. Will continue to progress pt per PT POC as appropriate.

## 2018-06-20 NOTE — PLAN OF CARE
Problem: Patient Care Overview  Goal: Plan of Care Review  Outcome: Ongoing (interventions implemented as appropriate)    Goal: Discharge Needs Assessment  Outcome: Ongoing (interventions implemented as appropriate)    Goal: Interprofessional Rounds/Family Conf  Outcome: Ongoing (interventions implemented as appropriate)      Problem: Skin Injury Risk (Adult)  Goal: Identify Related Risk Factors and Signs and Symptoms  Outcome: Ongoing (interventions implemented as appropriate)      Problem: Stroke (Ischemic) (Adult)  Goal: Signs and Symptoms of Listed Potential Problems Will be Absent, Minimized or Managed (Stroke)  Outcome: Ongoing (interventions implemented as appropriate)

## 2018-06-20 NOTE — PROGRESS NOTES
"Patient Name:  Jessy Harmon  YOB: 1945  [id number]    Surgery Progress Note    Date of visit: 6/20/2018    Subjective   Subjective: Feels OK, no complaints.         Objective     Objective:     /78 (BP Location: Left arm, Patient Position: Lying)   Pulse 73   Temp 98.2 °F (36.8 °C) (Oral)   Resp 16   Ht 165.1 cm (65\")   Wt 71.2 kg (157 lb)   SpO2 94%   BMI 26.13 kg/m²     Intake/Output Summary (Last 24 hours) at 06/20/18 0845  Last data filed at 06/20/18 0600   Gross per 24 hour   Intake             1790 ml   Output             1100 ml   Net              690 ml       CV:  Rhythm  regular and rate regular   L:  Clear  to auscultation bilaterally   Abd:  Bowel sounds positive , soft, nontender  Ext:  No cyanosis, clubbing, edema    Labs that are back at this time have been reviewed.        Assessment/Plan     Assessment/ Plan:    Dysphagia - Plan for PEG today if fails FEES    Hospital Problem List     * (Principal)Cerebrovascular accident (CVA), basilar artery occlusion, s/p TPA/thrombectomy/stenting    Hyperlipidemia        Hypertension        Chronic kidney disease    Stroke- History    Overview Signed 5/22/2018  1:40 PM by KENIA Wright     Multiple in September 2011 and Dec 2011                   Eliu Coles MD  6/20/2018  8:45 AM      Noted that the patient passed her FEES, and is being started on PO diet. Will cancel PEG. Will sign off. Please reconsult if deteriorates.    Eliu Coles MD  1:06 PM    "

## 2018-06-20 NOTE — PLAN OF CARE
Problem: Patient Care Overview  Goal: Plan of Care Review  Outcome: Ongoing (interventions implemented as appropriate)   06/20/18 9039   Plan of Care Review   Progress improving   Coping/Psychosocial   Plan of Care Reviewed With patient;grandchild(j carlos)   OTHER   Outcome Summary Pt demo improved RUE grasp this date though conts to be limited d/t more proximal RUE weakness limiting functional task performance. Pt educated on/adminstered AE to improve independence w/ self-feeding while using LUE. Recommend cont skilled IPOT POC.

## 2018-06-20 NOTE — THERAPY TREATMENT NOTE
Acute Care - Physical Therapy Treatment Note  [facility]     Patient Name: Jessy Harmon  : 1945  MRN: [id number]  Today's Date: 2018  Onset of Illness/Injury or Date of Surgery: 18  Date of Referral to PT: 18  Referring Physician: MD Renu    Admit Date: 2018    Visit Dx:    ICD-10-CM ICD-9-CM   1. Cerebrovascular accident (CVA), unspecified mechanism I63.9 434.91   2. Vertebrobasilar artery stenosis I65.1 433.30    I65.09    3. Dysarthria R47.1 784.51   4. Weakness of left lower extremity R29.898 729.89   5. Impaired functional mobility, balance, gait, and endurance Z74.09 V49.89   6. Oropharyngeal dysphagia R13.12 787.22   7. Impaired mobility and ADLs Z74.09 799.89     Patient Active Problem List   Diagnosis   • CVA (cerebral vascular accident)   • Hyperlipidemia   • Hypertension   • Chronic kidney disease   • Stroke- History   • Atherosclerotic cerebrovascular disease   • Vertebrobasilar artery stenosis   • Cerebrovascular accident (CVA), basilar artery occlusion, s/p TPA/thrombectomy/stenting       Therapy Treatment          Rehabilitation Treatment Summary     Row Name 18 1039             Treatment Time/Intention    Discipline (P)  physical therapist  -JIL      Document Type (P)  therapy note (daily note)  -JIL      Subjective Information (P)  complains of;weakness;fatigue;pain  -JIL      Mode of Treatment (P)  physical therapy  -JIL      Patient/Family Observations (P)  Grandson present in room.  -JIL      Patient Effort (P)  good  -JIL      Existing Precautions/Restrictions (P)  fall;other (see comments)   NG tube  -JIL      Recorded by [JIL] Reid Mancilla, PT Student 18 1123      Row Name 18 1039             Vital Signs    Pre Systolic BP Rehab (P)  137  -JIL      Pre Treatment Diastolic BP (P)  91  -JIL      Post Systolic BP Rehab (P)  141  -JIL      Post Treatment Diastolic BP (P)  94  -JIL      Pretreatment Heart Rate (beats/min) (P)  76  -JIL      Posttreatment  Heart Rate (beats/min) (P)  72  -JIL      Pre SpO2 (%) (P)  95  -JIL      O2 Delivery Pre Treatment (P)  room air  -JIL      Post SpO2 (%) (P)  96  -JIL      O2 Delivery Post Treatment (P)  room air  -JIL      Pre Patient Position (P)  Supine  -JIL      Intra Patient Position (P)  Standing  -JIL      Post Patient Position (P)  Sitting  -JIL      Recorded by [JIL] Reid Mancilla, PT Student 06/20/18 1123      Row Name 06/20/18 1039             Cognitive Assessment/Intervention- PT/OT    Affect/Mental Status (Cognitive) (P)  WFL  -JIL      Orientation Status (Cognition) (P)  oriented to;person;place  -JIL      Follows Commands (Cognition) (P)  follows one step commands;over 90% accuracy;delayed response/completion;physical/tactile prompts required;verbal cues/prompting required;repetition of directions required  -JIL      Cognitive Function (Cognitive) (P)  safety deficit  -JIL      Safety Deficit (Cognitive) (P)  moderate deficit;insight into deficits/self awareness;safety precautions awareness;safety precautions follow-through/compliance  -JIL      Personal Safety Interventions (P)  fall prevention program maintained;gait belt;nonskid shoes/slippers when out of bed  -JIL      Recorded by [JIL] Reid Mancilla, PT Student 06/20/18 1123      Row Name 06/20/18 1039             Safety Issues, Functional Mobility    Safety Issues Affecting Function (Mobility) (P)  insight into deficits/self awareness;safety precaution awareness;safety precautions follow-through/compliance;sequencing abilities  -JIL      Impairments Affecting Function (Mobility) (P)  balance;cognition;coordination;endurance/activity tolerance;pain;strength  -JIL      Comment, Safety Issues/Impairments (Mobility) (P)  Continues to lean trunk toward R side when upright; req frequent cuing to correct.  -JIL      Recorded by [JIL] Reid Mancilla, PT Student 06/20/18 1123      Row Name 06/20/18 1039             Bed Mobility Assessment/Treatment    Supine-Sit [place] (Bed  Mobility) (P)  maximum assist (25% patient effort);2 person assist;verbal cues  -JIL      Assistive Device (Bed Mobility) (P)  draw sheet;head of bed elevated  -JIL      Recorded by [JIL] Reid Mancilla, PT Student 06/20/18 1123      Row Name 06/20/18 1039             Transfer Assessment/Treatment    Comment (Transfers) (P)  Req VC for erect posture and maintaining midline during transfer.  -JIL      Bed-Chair [place] (Transfers) (P)  maximum assist (25% patient effort);2 person assist;verbal cues  -JIL      Assistive Device (Bed-Chair Transfers) (P)  other (see comments)   BUE support  -JIL      Sit-Stand [place] (Transfers) (P)  maximum assist (25% patient effort);2 person assist;verbal cues  -JIL      Stand-Sit [place] (Transfers) (P)  maximum assist (25% patient effort);2 person assist;verbal cues  -JIL      Recorded by [JIL] Reid Mancilla, PT Student 06/20/18 1123      Row Name 06/20/18 1039             Sit-Stand Transfer    Assistive Device (Sit-Stand Transfers) (P)  other (see comments)   BUE support  -JIL      Recorded by [JIL] Reid Mancilla, PT Student 06/20/18 1123      Row Name 06/20/18 1039             Stand-Sit Transfer    Assistive Device (Stand-Sit Transfers) (P)  other (see comments)   BUE support  -JIL      Recorded by [JIL] Reid Mancilla, PT Student 06/20/18 1123      Row Name 06/20/18 1039             Gait/Stairs Assessment/Training    [place] Level (Gait) (P)  not tested  -JIL      Comment (Gait/Stairs) (P)  Deferred due to pt functional status.  -JIL      Recorded by [JIL] Reid Mancilla, PT Student 06/20/18 1123      Row Name 06/20/18 1039             General ROM    GENERAL ROM COMMENTS (P)  RLE grossly 2+/5, LLE grossly 3/5  -JIL      Recorded by [JIL] Reid Mancilla, PT Student 06/20/18 1123      Row Name 06/20/18 1039             Therapeutic Exercise    Therapeutic Exercise (P)  supine, lower extremities  -JIL      24558 - PT Therapeutic Exercise Minutes (P)  8  -JIL      32048 - PT Therapeutic  Activity Minutes (P)  18  -JIL      Recorded by [JIL] Reid Mancilla, PT Student 06/20/18 1123      Row Name 06/20/18 1039             Lower Extremity Seated Therapeutic Exercise    Performed, Seated Lower Extremity (Therapeutic Exercise) (P)  hip flexion/extension;hip abduction/adduction;ankle dorsiflexion/plantarflexion;LAQ (long arc quad), knee extension  -JIL      Exercise Type, Seated Lower Extremity (Therapeutic Exercise) (P)  AROM (active range of motion)  -JIL      Sets/Reps Detail, Seated Lower Extremity (Therapeutic Exercise) (P)  BLE 10x each  -JIL      Recorded by [JIL] Reid Mancilla, PT Student 06/20/18 1128      Row Name 06/20/18 1039             Lower Extremity Supine Therapeutic Exercise    Performed, Supine Lower Extremity (Therapeutic Exercise) (P)  hip flexion/extension;ankle dorsiflexion/plantarflexion;knee flexion/extension  -JIL      Exercise Type, Supine Lower Extremity (Therapeutic Exercise) (P)  AROM (active range of motion)  -JIL      Sets/Reps Detail, Supine Lower Extremity (Therapeutic Exercise) (P)  BLE 10x each  -JIL      Recorded by [JIL] Reid Mancilla, PT Student 06/20/18 1128      Row Name 06/20/18 1039             Static Sitting Balance    Level of [place] (Unsupported Sitting, Static Balance) (P)  moderate assist, 50 to 74% patient effort  -JIL      Sitting Position (Unsupported Sitting, Static Balance) (P)  sitting on edge of bed  -JB2      Time Able to Maintain Position (Unsupported Sitting, Static Balance) (P)  more than 5 minutes  -JB2      Comment (Unsupported Sitting, Static Balance) (P)  Pt progressed to periods of Min A when re-oriented to midline. Practiced WB on LUE to assist with midline orientation. Pt able to sustain midline for 1-2 min each time after period of WB on LUE (overcorrecting for midline). Pt req VC/TC as she became fatigued to sit upright and to prevent LOB to R.  -JB2      Recorded by [JIL] Reid Mancilla, PT Student 06/20/18 6016  [JB2] Reid Mancilla, PT Student  06/20/18 1128      Row Name 06/20/18 1039             Static Standing Balance    Level of [place] (Supported Standing, Static Balance) (P)  maximal assist, 25 to 49% patient effort  -JIL      Time Able to Maintain Position (Supported Standing, Static Balance) (P)  15 to 30 seconds  -JB2      Assistive Device Utilized (Supported Standing, Static Balance) (P)  other (see comments)   BUE support  -JB2      Recorded by [JIL] Reid Mancilla, PT Student 06/20/18 1349  [JB2] Reid Mancilla, PT Student 06/20/18 1128      Row Name 06/20/18 1039             Positioning and Restraints    Pre-Treatment Position (P)  in bed  -JIL      Post Treatment Position (P)  chair  -JIL      In Chair (P)  notified nsg;reclined;sitting;call light within reach;encouraged to call for assist;exit alarm on;with family/caregiver;RUE elevated;LUE elevated;waffle cushion;on mechanical lift sling;legs elevated;R heel elevated;L heel elevated  -JIL      Recorded by [JIL] Reid Mancilla, PT Student 06/20/18 1128      Row Name 06/20/18 1039             Pain Scale: Numbers Pre/Post-Treatment    Pain Scale: Numbers, Pretreatment (P)  5/10  -JIL      Pain Scale: Numbers, Post-Treatment (P)  5/10  -JIL      Pain Location - Side (P)  Right  -JIL      Pain Location - Orientation (P)  upper  -JIL      Pain Location (P)  extremity  -JIL      Pre/Post Treatment Pain Comment (P)  Pt c/o of increased RUE pain; RUE particularly sensitive today during t/fs and EOB activity.  -JIL      Pain Intervention(s) (P)  Repositioned;Ambulation/increased activity;Rest  -JIL      Recorded by [JIL] Reid Mancilla, PT Student 06/20/18 1128      Row Name 06/20/18 1039             Coping    Observed Emotional State (P)  calm;cooperative  -JIL      Verbalized Emotional State (P)  acceptance  -JIL      Recorded by [JIL] Reid Mancilla, PT Student 06/20/18 1128      Row Name 06/20/18 1039             Plan of Care Review    Plan of Care Reviewed With (P)  patient;grandchild(j carlos)  -JIL      Recorded by  [JIL] Reid Mancilla, PT Student 06/20/18 1128      Row Name 06/20/18 1039             Outcome Summary/Treatment Plan (PT)    Daily Summary of Progress (PT) (P)  progress toward functional goals is good  -JIL      Recorded by [JIL] Reid Mancilla, PT Student 06/20/18 1128        User Key  (r) = Recorded By, (t) = Taken By, (c) = Cosigned By    Initials Name Effective Dates Discipline    JIL Reid Mancilla, PT Student 05/15/18 -  PT                     Physical Therapy Education     Title: PT OT SLP Therapies (Active)     Topic: Physical Therapy (Active)     Point: Mobility training (Active)    Learning Progress Summary     Learner Status Readiness Method Response Comment Documented by    Patient Active Acceptance E NR  JIL 06/20/18 1039     Active Acceptance E NR  JIL 06/19/18 1410     Active Acceptance E NR  JIL 06/18/18 1401     Done Acceptance E,D VU,NR   06/15/18 1007     Active Acceptance E,D NR   06/14/18 1529    Family Active Acceptance E,D NR   06/14/18 1529          Point: Home exercise program (Active)    Learning Progress Summary     Learner Status Readiness Method Response Comment Documented by    Patient Active Acceptance E NR  JIL 06/20/18 1039     Active Acceptance E NR  JIL 06/19/18 1410     Active Acceptance E NR  JIL 06/18/18 1401     Done Acceptance E,D VU,NR   06/15/18 1007     Active Acceptance E,D NR   06/14/18 1529    Family Active Acceptance E,D NR  LS 06/14/18 1529          Point: Body mechanics (Active)    Learning Progress Summary     Learner Status Readiness Method Response Comment Documented by    Patient Active Acceptance E NR  JIL 06/20/18 1039     Active Acceptance E NR  JIL 06/19/18 1410     Active Acceptance E NR  JIL 06/18/18 1401     Done Acceptance E,D VU,NR   06/15/18 1007     Active Acceptance E,D NR  LS 06/14/18 1529    Family Active Acceptance E,D NR   06/14/18 1529          Point: Precautions (Active)    Learning Progress Summary     Learner Status Readiness Method Response  Comment Documented by    Patient Active Acceptance E NR  JIL 06/20/18 1039     Active Acceptance E NR  JIL 06/19/18 1410     Active Acceptance E NR  JIL 06/18/18 1401     Done Acceptance E,D VU,NR  LS 06/15/18 1007     Active Acceptance E,D NR  LS 06/14/18 1529    Family Active Acceptance E,D NR   06/14/18 1529                      User Key     Initials Effective Dates Name Provider Type Discipline     06/19/15 -  Piedad Tobias, PT Physical Therapist PT     05/15/18 -  Reid Mancilla, PT Student PT Student PT                    PT Recommendation and Plan     Outcome Summary/Treatment Plan (PT)  Daily Summary of Progress (PT): (P) progress toward functional goals is good  Plan of Care Reviewed With: (P) patient, grandchild(j carlos)  Progress: (P) improving  Outcome Summary: (P) Pt demonstrated improved duration and quality of EOB sitting this session; pt req frequent cuing to re-orient to midline initially, but was able to correct and sustain midline for ~1 min. Performed SPT bed-chair with Max A x 2. Pt continues to be limited by BLE weakness (R>L) and decreased gross motor control. Will continue to progress pt per PT POC as appropriate.          Outcome Measures     Row Name 06/20/18 1039 06/19/18 1410 06/18/18 1401       How much help from another person do you currently need...    Turning from your back to your side while in flat bed without using bedrails? (P)  2  -JIL 2  -JBA (r) JIL (t) JBA (c) 2  -LS (r) JIL (t) LS (c)    Moving from lying on back to sitting on the side of a flat bed without bedrails? (P)  2  -JIL 2  -JBA (r) JIL (t) JBA (c) 2  -LS (r) JIL (t) LS (c)    Moving to and from a bed to a chair (including a wheelchair)? (P)  2  -JIL 1  -JBA (r) JIL (t) JBA (c) 2  -LS (r) JIL (t) LS (c)    Standing up from a chair using your arms (e.g., wheelchair, bedside chair)? (P)  2  -JIL 1  -JBA (r) JIL (t) JBA (c) 2  -LS (r) JIL (t) LS (c)    Climbing 3-5 steps with a railing? (P)  1  -JIL 1  -JBA (r) JIL (t) JBA (c) 1  -LS  (r) JIL (t) LS (c)    To walk in hospital room? (P)  1  -JIL 1  -JBA (r) JIL (t) JBA (c) 1  -LS (r) JIL (t) LS (c)    AM-PAC 6 Clicks Score (P)  10  -JIL 8  -JBA (r) JIL (t) 10  -LS (r) JIL (t)       Modified Giorgio Scale    Modified Blackford Scale (P)  4 - Moderately severe disability.  Unable to walk without assistance, and unable to attend to own bodily needs without assistance.  -JIL  --  --       Functional Assessment    Outcome Measure Options (P)  AM-PAC 6 Clicks Basic Mobility (PT)  -JIL AM-PAC 6 Clicks Basic Mobility (PT)  -JBA (r) JIL (t) JBA (c) AM-PAC 6 Clicks Basic Mobility (PT)  -LS (r) JIL (t) LS (c)    Row Name 06/18/18 0837             How much help from another is currently needed...    Putting on and taking off regular lower body clothing? 1  -CL      Bathing (including washing, rinsing, and drying) 1  -CL      Toileting (which includes using toilet bed pan or urinal) 1  -CL      Putting on and taking off regular upper body clothing 2  -CL      Taking care of personal grooming (such as brushing teeth) 2  -CL      Eating meals 1  -CL      Score 8  -CL         Modified Giorgio Scale    Modified Giorgio Scale 4 - Moderately severe disability.  Unable to walk without assistance, and unable to attend to own bodily needs without assistance.  -CL         Functional Assessment    Outcome Measure Options AM-PAC 6 Clicks Daily Activity (OT)  -CL        User Key  (r) = Recorded By, (t) = Taken By, (c) = Cosigned By    Initials Name Provider Type    MICKEY Olivier, PT Physical Therapist    LS Piedad Tobias, PT Physical Therapist    CL Radha Becerril, OT Occupational Therapist    JIL Reid Mancilla, PT Student PT Student           Time Calculation:         PT Charges     Row Name 06/20/18 1039             Time Calculation    Start Time (P)  1039  -JIL      PT Received On (P)  06/20/18  -JIL      PT Goal Re-Cert Due Date (P)  06/24/18  -JIL         Time Calculation- PT    Total Timed Code Minutes- PT (P)  26 minute(s)  -JIL          Timed Charges    88572 - PT Therapeutic Exercise Minutes (P)  8  -JIL      01195 - PT Therapeutic Activity Minutes (P)  18  -JIL        User Key  (r) = Recorded By, (t) = Taken By, (c) = Cosigned By    Initials Name Provider Type    JIL Mancilla, PT Student PT Student        Therapy Suggested Charges     Code   Minutes Charges    76196 (CPT®) Hc Pt Neuromusc Re Education Ea 15 Min      57088 (CPT®) Hc Pt Ther Proc Ea 15 Min 8 1    04808 (CPT®) Hc Gait Training Ea 15 Min      33554 (CPT®) Hc Pt Therapeutic Act Ea 15 Min 18 1    00669 (CPT®) Hc Pt Manual Therapy Ea 15 Min      55551 (CPT®) Hc Pt Iontophoresis Ea 15 Min      14482 (CPT®) Hc Pt Elec Stim Ea-Per 15 Min      51218 (CPT®) Hc Pt Ultrasound Ea 15 Min      13967 (CPT®) Hc Pt Self Care/Mgmt/Train Ea 15 Min      Total  26 2        Therapy Charges for Today     Code Description Service Date Service Provider Modifiers Qty    81147137738 HC PT THERAPEUTIC ACT EA 15 MIN 6/19/2018 Reid Mancilla, PT Student GP 1    01675843175 HC PT THER PROC EA 15 MIN 6/20/2018 Reid Mancilla, PT Student GP 1    77710773714 HC PT THERAPEUTIC ACT EA 15 MIN 6/20/2018 Reid Mancilla, PT Student GP 1          PT G-Codes  Outcome Measure Options: (P) AM-PAC 6 Clicks Basic Mobility (PT)    Reid Mancilla, PT Student  6/20/2018

## 2018-06-20 NOTE — PROGRESS NOTES
Intensive Care Follow-up      LOS: 7 days     Ms. Jessy Harmon, 72 y.o. female is followed for: Cerebrovascular accident (CVA)     Subjective - Interval History     Awake and alert  No problems overnight  Past dysphagia evaluation this morning.  Plans for PEG put on hold    The patient's relevant past medical, surgical and social history were reviewed and updated in Epic as appropriate.     Objective     Infusions:    niCARdipine 5-15 mg/hr Last Rate: Stopped (06/20/18 0700)   phenylephrine (BALA-SYNEPHRINE) 50 mg/250 (0.2 mg/mL) infusion 0.5-3 mcg/kg/min      Medications:    amLODIPine 10 mg Oral Q24H   aspirin 81 mg Oral Daily   atorvastatin 80 mg Oral Nightly   ceFAZolin 2 g Intravenous Once   clopidogrel 75 mg Oral Daily   insulin regular 0-7 Units Subcutaneous 4x Daily AC & at Bedtime   metoprolol tartrate 12.5 mg Oral Q12H     Intake/Output       06/19/18 0700 - 06/20/18 0659 06/20/18 0700 - 06/21/18 0659    Intake (ml) 1850 0    Output (ml) 1100 0    Net (ml) 750 0        Vital Sign Min/Max for last 24 hours  Temp  Min: 98.1 °F (36.7 °C)  Max: 98.8 °F (37.1 °C)   BP  Min: 113/73  Max: 165/90   Pulse  Min: 64  Max: 92   Resp  Min: 14  Max: 20   SpO2  Min: 92 %  Max: 97 %   No Data Recorded        Physical Exam:   GENERAL: Weight, no distress   HEENT: No adenopathy or thyromegaly   LUNGS: No wheezes or rhonchi   HEART: Regular rate and rhythm without murmurs   ABDOMEN:  soft, nontender.  Bowel sounds present   EXTREMITIES: Trace edema, no cyanosis   NEURO/PSYCH: Awake and alert.  Follows commands.  Continued dysarthria and unilateral weakness.  Unchanged      Results from last 7 days  Lab Units 06/20/18  0543 06/18/18  0445 06/17/18  0416   WBC 10*3/mm3 12.37* 10.66 13.21*   HEMOGLOBIN g/dL 14.0 13.2 15.6*   PLATELETS 10*3/mm3 286 231 288       Results from last 7 days  Lab Units 06/20/18  0543 06/18/18  0445 06/17/18  0416  06/15/18  1308   SODIUM mmol/L 140 140 136  --   --    POTASSIUM mmol/L 4.7 4.0 4.0  <  >  --    CO2 mmol/L 28.0 28.0 27.0  --   --    BUN mg/dL 29* 34* 25*  --   --    CREATININE mg/dL 0.90 0.84 0.87  --   --    MAGNESIUM mg/dL 2.2 2.2  --   --  2.1   PHOSPHORUS mg/dL 3.9 2.9  --   --  2.6   GLUCOSE mg/dL 119* 191* 189*  --   --    < > = values in this interval not displayed.  Estimated Creatinine Clearance: 54.3 mL/min (by C-G formula based on SCr of 0.9 mg/dL).      Results from last 7 days  Lab Units 06/14/18  0512   HEMOGLOBIN A1C % 6.10*           No results found for: LACTATE       Images: Most recent chest x-ray without consolidation    I reviewed the patient's results and images.     Impression      Hospital Problem List     * (Principal)Cerebrovascular accident (CVA), basilar artery occlusion, s/p TPA/thrombectomy/stenting    Hyperlipidemia    Hypertension    Chronic kidney disease    Stroke- History    Overview Signed 5/22/2018  1:40 PM by KENIA Wright in September 2011 and Dec 2011                    Plan        Advance diet  Continue physical therapy and occupational therapy  Continue to look into inpatient rehabilitation options  May require additional blood pressure medication    Plan of care and goals reviewed with mulitdisciplinary team at daily rounds   I discussed the patient's findings and my recommendations with patient and nursing staff       JOSEFINA Melton MD  Pulmonary and Critical Care Medicine

## 2018-06-20 NOTE — DISCHARGE SUMMARY
Date of Discharge:  6/20/2018    Discharge Diagnosis:   CVA, basal artery occlusion, status post TPA/thrombectomy/stenting  Hyperlipidemia  Hypertension  Chronic kidney disease  History of stroke    Presenting Problem/History of Present Illness  Cerebrovascular accident (CVA), unspecified mechanism [I63.9]     72 y.o. female well known to the Neurointerventional service, having been treated on multiple occasions for advanced intracranial atherosclerotic disease (ICAD) and prior basilar occlusion X 2.  She's had angioplasty/stent placement involving her intracranial right vertebral artery greater than 5 years ago (Wingspan & Xience).  She did very well for several years, but presented to [facility] in mid-May 2018 for recurrent ICAD and basilar thrombosis.  She was transferred to [facility], and underwent emergent neurointervention with IA tPA/thrombectomy and angioplasty of critical stenosis of right VB junction on 5/22/18.  This represented progression of disease, as her prior stents were widely patent.  She was restarted on Plavix/ASA regimen, and made an excellent recovery.       She re-presented to [facility] emergency department on 6/13/2018 with a 2 day history of worsening speech difficulties and worsening left lower extremity weakness. Given her sub--acute presentation, with symptom onset occurring 2 days prior, she was deemed not a candidate for IV TPA therapy.  She did undergo MR angiography of the head, which demonstrated reocclusion of her basilar artery.  Given concern for further progression of symptoms and worsening brainstem strokes, she was taken for emergent neuro intervention. Catheter angiogram on 6/13/2018 demonstrates reocclusion of the proximal basilar artery secondary to a critical (greater than 99%) stenosis involving the right vertebrobasilar junction (at the site of recent angioplasty).  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   Previously placed Wingspan/Xience stents within the right vertebral artery are widely patent.  There was restoration of normal flow to the intracranial vertebrobasilar system with placement of a 2.25 x 8 mm Xience drug-eluting stent at the critical/recurrent right vertebrobasilar stenosis.    Hospital Course  Patient is a 72 y.o. female presented with CVA as described in above history of present illness.  She was taken to Cath Lab and subsequently transferred to ICU for continued care.  Following day the patient underwent repeat  CT scan of the head which showedischemic changes within the brainstem and Childers, the majority of which appear chronic in nature.  No intracranial hemorrhages were identified.  It was noted that she suffered new ischemic insults to the posterior fossa and an MRI/MRA was subsequently ordered.  She was started on dual antiplatelet and statin therapy which will be lifelong.  Findings of the MRI were unremarkable.  Findings of MRA are appended below.  Patient was seen by PT/OT/speech and followed throughout her hospital course.  Patient initially failed her fees study and an NG tube was placed.  Tube feeds were started.  Her condition continued to improve however she remained weak.  By hospital day #6 the patient was oxygenating well on room air.  Dysphagia reevaluation was ordered for possible PEG tube placement if she failed.  She did subsequently fell and was seen by general surgery in consult for PEG tube placement.  By hospital day #6 the patient's condition had improved well enough that she was deemed able be transferred to telemetry.  There was no telemetry beds available at that time so she remained in ICU.  A follow-up swallow study was ordered on hospital day #8 which she did pass.  Please see diet as discussed below. She continues to have dysarthria (but not aphasia), and weakness on the right side of her body.  Her diplopia has resolved.  PEG tube placement was canceled.  Today,  6/20/18 the patient is set to be transferred to [facility] for further rehabilitation.  She is to follow-up in 3-4 weeks at the neuro intervention clinic.      Procedures Performed  Procedure(s):  Carotid Cerebral Angiogram       Consults:   Consults     Date and Time Order Name Status Description    6/19/2018 1349 Inpatient General Surgery Consult Completed     6/13/2018 1624 Inpatient consult to Intensivist Completed           Pertinent Test Results:    Findings of the MRA showed the following:    Both vertebral arteries show intermittent occlusion and  areas of reconstitution. The right vertebral artery is essentially  totally occluded. The basilar artery is markedly attenuated and also  shows intermittent areas of occlusion. The more distal basilar artery is  patent but very small and the posterior cerebral circulation is largely  related to patent bilateral posterior communicating arteries. When  compared to the examination of 06/13/2018 the flow in the left vertebral  artery and basilar artery is slightly improved.      Condition on Discharge:  Stable  Slight hypertension at times, may need antihypertensive medication changes  Tolerating oral intake  Continued to be followed by physical therapy, occupational therapy, and speech    Vital Signs  Temp:  [98 °F (36.7 °C)-98.8 °F (37.1 °C)] 98 °F (36.7 °C)  Heart Rate:  [64-92] 75  Resp:  [14-20] 16  BP: (113-165)/() 139/86    Physical Exam:    GENERAL: Weight, no distress              HEENT: No adenopathy or thyromegaly              LUNGS: No wheezes or rhonchi              HEART: Regular rate and rhythm without murmurs              ABDOMEN:  soft, nontender.  Bowel sounds present              EXTREMITIES: Trace edema, no cyanosis              NEURO/PSYCH: Awake and alert.  Follows commands.  Continued dysarthria and unilateral weakness.  Unchanged    Discharge Disposition  Rehab Facility or Unit (DC - External)    Discharge Medications      Discharge Medications      New Medications      Instructions Start Date   aspirin 81 MG chewable tablet  Replaces:  aspirin 81 MG EC tablet   81 mg, Oral, Daily   Start Date:  6/21/2018     dextrose 40 % gel  Commonly known as:  GLUTOSE   15 g, Oral, Every 15 Minutes PRN      glucagon (human recombinant) 1 MG injection  Commonly known as:  GLUCAGEN DIAGNOSTIC   1 mg, Subcutaneous, As Needed      hydrALAZINE 20 MG/ML injection  Commonly known as:  APRESOLINE   20 mg, Intravenous, Every 6 Hours PRN      HYDROcodone-acetaminophen 5-325 MG per tablet  Commonly known as:  NORCO   1 tablet, Oral, Every 4 Hours PRN      insulin regular 100 UNIT/ML injection  Commonly known as:  humuLIN R,novoLIN R   0-7 Units, Subcutaneous, 4 Times Daily Before Meals & Nightly      metoprolol tartrate 25 MG tablet  Commonly known as:  LOPRESSOR   12.5 mg, Oral, Every 12 Hours Scheduled      ondansetron 4 MG/2ML injection  Commonly known as:  ZOFRAN   4 mg, Intravenous, Every 6 Hours PRN         Changes to Medications      Instructions Start Date   amLODIPine 10 MG tablet  Commonly known as:  NORVASC  What changed:  · medication strength  · how much to take  · Another medication with the same name was removed. Continue taking this medication, and follow the directions you see here.   10 mg, Oral, Every 24 Hours Scheduled   Start Date:  6/21/2018     atorvastatin 80 MG tablet  Commonly known as:  LIPITOR  What changed:  how much to take   80 mg, Oral, Nightly         Continue These Medications      Instructions Start Date   clopidogrel 75 MG tablet  Commonly known as:  PLAVIX   75 mg, Oral, Daily   Start Date:  6/21/2018        Stop These Medications    aspirin 81 MG EC tablet  Replaced by:  aspirin 81 MG chewable tablet            Discharge Diet:   Diet Instructions     Diet: Consistent Carbohydrate, Cardiac, Dysphagia; Nectar / Syrup Thick Liquids; Mechanical Soft; Nectar / Syrup Thick       Discharge Diet:   Consistent  Carbohydrate  Cardiac  Dysphagia       Fluid Consistency:  Nectar / Syrup Thick Liquids    Pureed Options:  Mechanical Soft    Fluid Consistency:  Nectar / Syrup Thick   Boost plus: Lunch, dinner        Activity at Discharge:   Activity Instructions     Activity as Tolerated             Follow-up Appointments  Future Appointments  Date Time Provider Department Center   6/26/2018 1:00 PM Gilberto Sears MD [facility] None     Additional Instructions for the Follow-ups that You Need to Schedule     Discharge Follow-up with PCP    As directed      Currently Documented PCP:  Milvia Frye MD  PCP Phone Number:  [phone]    Follow Up Details:  ASAP after Rehab for Transition of care.         Discharge Follow-up with Specified Provider: Neurointervention Clinic; 1 Month    As directed      To:  Neurointervention Clinic    Follow Up:  1 Month                KENIA Wright  Pulmonary and Critical Care Medicine  06/20/18  12:18 PM    Time: Discharge 30 min

## 2018-06-20 NOTE — PROGRESS NOTES
Adult Nutrition  Assessment/PES    Patient Name:  Jessy Harmon  YOB: 1945  MRN: [id number]  Admit Date:  6/13/2018    Assessment Date:  6/20/2018    Comments: EN support dc'd; po dysphagia level 4 diet w/ NTL initiated. Boost plus-NTL supplement provided twice daily to optimize po intake. Pt awaiting transfer to rehab faciltiy.          Reason for Assessment     Row Name 06/20/18 1158          Reason for Assessment    Reason For Assessment TF/PN;per organizational policy;identified at risk by screening criteria;follow-up protocol   MDR, EN and dyspahagia support f/u 45 mins     Diagnosis neurologic conditions;cardiac disease;renal disease   AADM w/ CVA; basilar artery occlusion s/p tPA , thrombectomy, stentl, oropharyngeal dysphagia, HTN Hx: HLP, HTN, CKD, CXVA's x2     Identified At Risk by Screening Criteria MST SCORE 2+;tube feeding or parenteral nutrition;difficulty chewing/swallowing             Nutrition/Diet History     Row Name 06/20/18 1156          Nutrition/Diet History    Factors Affecting Nutritional Intake --   RN/SLP report pt passed FEES; PEG placement cancelled;  reports she is awaiting MD acceptance of pt w/ anticipated transfer to [facility] later today.               Labs/Tests/Procedures/Meds     Row Name 06/20/18 1206 06/20/18 1203       Labs/Procedures/Meds    Lab Results Reviewed -- reviewed, pertinent       Diagnostic Tests/Procedures    Diagnostic Test/Procedure Reviewed -- reviewed, pertinent    Diagnostic Test/Procedures Comments  -- FEES 6/20 dysphagia moderate pharyngeal w/ recs for dysphagia level 4  small bites and sips of NTL            Physical Findings     Row Name 06/20/18 1204          Physical Findings    Tubes nasoduodenal tube   to be dc'd               Nutrition Prescription Ordered     Row Name 06/20/18 1204          Nutrition Prescription PO    Current PO Diet Dysphagia     Dysphagia Level 4  Mechanical soft no mixed consistencies     Fluid  Consistency Nectar/syrup thick        Nutrition Prescription EN    Enteral Route ND     Product Fibersource HN     Modulars Liquid Protein (15 gm/30 mL)     Liquid Protein (15 gm/30 mL) 30 mL/1 packet     Protein Liquid Frequency Daily     TF Delivery Method Continuous     Continuous TF Goal Rate (mL/hr) 70 mL/hr     Continuous TF Current Rate (mL/hr) 70 mL/hr     Continuous TF Goal Volume (mL) 1500 mL     Continuous TF Current Volume (mL) 1680 mL     Water flush (mL)  10 mL     Water Flush Frequency Per hour             Evaluation of Received Nutrient/Fluid Intake     Row Name 06/20/18 1208          Nutrient/Fluid Evaluation    Number of Days Evaluated 1 day     Additional Documentation RDI (Group)        Calories Evaluation    Enteral Calories (kcal) 1680     Other Calories (kcal) 100     Total Calories (kcal) 1780     % of Kcal Needs 102        Protein Evaluation    Enteral Protein (gm) 75     Other Protein (gm) 15     Total Protein (gm) 90     % of Protein Needs 110        Intake Assessment    Energy/Calorie Requirement Assessment meeting needs     Protein Requirement Assessment meeting needs     Fluid Requirement Assessment meeting needs     Tolerance tolerating        Fluid Intake Evaluation    Enteral (Free Water) Fluid (mL) 1134     Free Water Flush Fluid (mL) 342     Total Free Water Intake (mL) 1476 mL        Recommended Daily Intake Evaluation    RDI Met        PO Evaluation    Number of Days PO Intake Evaluated 1 day     % PO Intake pt eating first meal w/o difficulty        EN Evaluation    Number of Days EN Intake Evaluated 1 day     EN Average Volume Delivered (mL/day) 1400 mL/day     % Goal Volume  93 %     TF Changes Discontinued             Problem/Interventions:        Problem 1     Row Name 06/20/18 1211          Nutrition Diagnoses Problem 1    Problem 1 Inadequate Intake/Infusion     Inadequate Intake Type Oral     Etiology (related to) Functional Diagnosis;Medical Diagnosis     Neurological  CVA     Functional Diagnosis Dysphagia     Signs/Symptoms (evidenced by) SLP/Swallow eval;NPO     Swallow eval status Done     Type of SLP Evaluation Other (comment)   FEES- moderate dysphagia ; dysphagia diet initiated                     Intervention Goal     Row Name 06/20/18 1213          Intervention Goal    General Meet nutritional needs for age/condition     PO Establish PO;Tolerate PO;Modify texture/consistency     Transition TF to PO             Nutrition Intervention     Row Name 06/20/18 1213          Nutrition Intervention    RD/Tech Action Advise alternate selection;Interview for preference;Recommend/ordered;Follow Tx progress;Care plan reviewd;Encourage intake     Recommended/Ordered Supplement             Nutrition Prescription     Row Name 06/20/18 1214 06/18/18 1732       Nutrition Prescription PO    PO Prescription Begin/change supplement  --    Fluid Consistency Nectar/syrup thick  --    Supplement Boost Plus  --    Supplement Frequency 2 times a day  --    New PO Prescription Ordered? Yes  --       Nutrition Prescription EN    Enteral Prescription Discontinue enteral feeding Continue same protocol            Education/Evaluation     Row Name 06/20/18 1214          Education    Education Provided education regarding     Provided education regarding Medical diagnosis;Avoidance/improvement of symptoms   dysphagia /avoidance of mixed consistency food items        Monitor/Evaluation    Monitor Per protocol;I&O;PO intake;Supplement intake;Symptoms         Electronically signed by:  Danette Cuevas RD  06/20/18 12:15 PM

## 2018-06-20 NOTE — PLAN OF CARE
Problem: Patient Care Overview  Goal: Plan of Care Review  Outcome: Ongoing (interventions implemented as appropriate)   06/20/18 1020   Plan of Care Review   Progress (FEES )   Coping/Psychosocial   Plan of Care Reviewed With patient;grandchild(j carlos)   SLP evaluation completed. Will continue to address dysphagia. Please see note for further details and recommendations.

## 2018-06-20 NOTE — PROGRESS NOTES
Case Management Discharge Note    Final Note: [facility] today [facility] will transport.    Destination     Service Request Status Selected Specialties Address Phone Number Fax Number    [facility] Pending - Request Sent N/A [id number], [place] [id number] [phone]    [facility] Pending - Request Sent N/A [id number], [place] [id number] [phone]      Durable Medical Equipment     No service coordination in this encounter.      Dialysis/Infusion     No service coordination in this encounter.      Home Medical Care     No service coordination in this encounter.      Social Care     No service coordination in this encounter.             Final Discharge Disposition Code: 62 - inpatient rehab facility

## 2018-06-20 NOTE — THERAPY TREATMENT NOTE
Acute Care - Occupational Therapy Treatment Note  [facility]     Patient Name: Jessy Harmon  : 1945  MRN: [id number]  Today's Date: 2018  Onset of Illness/Injury or Date of Surgery: 18  Date of Referral to OT: 18  Referring Physician: MD Renu    Admit Date: 2018       ICD-10-CM ICD-9-CM   1. Cerebrovascular accident (CVA), unspecified mechanism I63.9 434.91   2. Vertebrobasilar artery stenosis I65.1 433.30    I65.09    3. Dysarthria R47.1 784.51   4. Weakness of left lower extremity R29.898 729.89   5. Impaired functional mobility, balance, gait, and endurance Z74.09 V49.89   6. Oropharyngeal dysphagia R13.12 787.22   7. Impaired mobility and ADLs Z74.09 799.89     Patient Active Problem List   Diagnosis   • CVA (cerebral vascular accident)   • Hyperlipidemia   • Hypertension   • Chronic kidney disease   • Stroke- History   • Atherosclerotic cerebrovascular disease   • Vertebrobasilar artery stenosis   • Cerebrovascular accident (CVA), basilar artery occlusion, s/p TPA/thrombectomy/stenting     Past Medical History:   Diagnosis Date   • Atherosclerotic cerebrovascular disease    • Chronic kidney disease    • Hyperlipidemia    • Hypertension    • Stroke     Multiple in 2011 and Dec 2011     Past Surgical History:   Procedure Laterality Date   • CEREBRAL ANGIOGRAM     • CEREBRAL ANGIOGRAM N/A 2018    Procedure: Cerebral angiogram;  Surgeon: Gilberto Sears MD;  Location:  [facility];  Service: Interventional Radiology   • CORONARY ARTERY BYPASS GRAFT     • INTERVENTIONAL RADIOLOGY PROCEDURE Bilateral 2018    Procedure: Carotid Cerebral Angiogram;  Surgeon: Gilberto Sears MD;  Location:  [facility] INVASIVE LOCATION;  Service: Interventional Radiology   • INTRACRANIAL ARTERY ANGIOPLASTY         Therapy Treatment          Rehabilitation Treatment Summary     Row Name 18 1039 18 1010          Treatment Time/Intention    Discipline (P)   physical therapist  -JIL occupational therapist  -CL     Document Type (P)  therapy note (daily note)  -JIL therapy note (daily note)  -CL     Subjective Information (P)  complains of;weakness;fatigue;pain  -JIL complains of;weakness  -CL     Mode of Treatment (P)  physical therapy  -JIL  --     Patient/Family Observations (P)  Grandson present in room.  -JIL  --     Patient Effort (P)  good  -JIL good  -CL     Existing Precautions/Restrictions (P)  fall;other (see comments)   NG tube  -JIL fall;other (see comments)   NG, R sided weakness  -CL     Recorded by [JIL] Reid Mancilla, PT Student 06/20/18 1123 [CL] Radha Becerril, OT 06/20/18 1408     Row Name 06/20/18 1039 06/20/18 1010          Vital Signs    Pre Systolic BP Rehab (P)  137  -  -CL     Pre Treatment Diastolic BP (P)  91  -JIL 91  -CL     Post Systolic BP Rehab (P)  141  -  -CL     Post Treatment Diastolic BP (P)  94  -JIL 56  -CL     Pretreatment Heart Rate (beats/min) (P)  76  -JIL 79  -CL     Posttreatment Heart Rate (beats/min) (P)  72  -JIL 57  -CL     Pre SpO2 (%) (P)  95  -JIL 96  -CL     O2 Delivery Pre Treatment (P)  room air  -JIL room air  -CL     Post SpO2 (%) (P)  96  -JIL 95  -CL     O2 Delivery Post Treatment (P)  room air  -JIL room air  -CL     Pre Patient Position (P)  Supine  -JIL Supine  -CL     Intra Patient Position (P)  Standing  -JIL Supine  -CL     Post Patient Position (P)  Sitting  -JIL Supine  -CL     Recorded by [JIL] Reid Mancilla, PT Student 06/20/18 1123 [CL] Radha Becerril, OT 06/20/18 1408     Row Name 06/20/18 1039 06/20/18 1010          Cognitive Assessment/Intervention- PT/OT    Affect/Mental Status (Cognitive) (P)  WFL  -JIL confused  -CL     Orientation Status (Cognition) (P)  oriented to;person;place  -JIL oriented x 4  -CL     Follows Commands (Cognition) (P)  follows one step commands;over 90% accuracy;delayed response/completion;physical/tactile prompts required;verbal cues/prompting required;repetition of directions required   -JIL follows one step commands;75-90% accuracy;verbal cues/prompting required;repetition of directions required  -CL     Cognitive Function (Cognitive) (P)  safety deficit  -JIL safety deficit  -CL     Safety Deficit (Cognitive) (P)  moderate deficit;insight into deficits/self awareness;safety precautions awareness;safety precautions follow-through/compliance  -JIL moderate deficit;awareness of need for assistance;safety precautions awareness  -CL     Personal Safety Interventions (P)  fall prevention program maintained;gait belt;nonskid shoes/slippers when out of bed  -JIL fall prevention program maintained;nonskid shoes/slippers when out of bed;supervised activity  -CL     Recorded by [JIL] Reid Mancilla, PT Student 06/20/18 1123 [CL] Radha Becerril, OT 06/20/18 1408     Row Name 06/20/18 1039             Safety Issues, Functional Mobility    Safety Issues Affecting Function (Mobility) (P)  insight into deficits/self awareness;safety precaution awareness;safety precautions follow-through/compliance;sequencing abilities  -JIL      Impairments Affecting Function (Mobility) (P)  balance;cognition;coordination;endurance/activity tolerance;pain;strength  -JIL      Comment, Safety Issues/Impairments (Mobility) (P)  Continues to lean trunk toward R side when upright; req frequent cuing to correct.  -JIL      Recorded by [JIL] Reid Mancilla, PT Student 06/20/18 1123      Row Name 06/20/18 1039             Bed Mobility Assessment/Treatment    Supine-Sit [place] (Bed Mobility) (P)  maximum assist (25% patient effort);2 person assist;verbal cues  -JIL      Assistive Device (Bed Mobility) (P)  draw sheet;head of bed elevated  -JIL      Recorded by [JIL] Reid Mancilla, PT Student 06/20/18 1123      Row Name 06/20/18 1039 06/20/18 1010          Transfer Assessment/Treatment    Comment (Transfers) (P)  Req VC for erect posture and maintaining midline during transfer.  -JIL Deferred to PT.   -CL     Bed-Chair [place] (Transfers) (P)   maximum assist (25% patient effort);2 person assist;verbal cues  -JIL  --     Assistive Device (Bed-Chair Transfers) (P)  other (see comments)   BUE support  -JIL  --     Sit-Stand [place] (Transfers) (P)  maximum assist (25% patient effort);2 person assist;verbal cues  -JIL  --     Stand-Sit [place] (Transfers) (P)  maximum assist (25% patient effort);2 person assist;verbal cues  -JIL  --     Recorded by [JIL] Reid Mancilla, PT Student 06/20/18 1123 [CL] Radha Becerril, OT 06/20/18 1408     Row Name 06/20/18 1039             Sit-Stand Transfer    Assistive Device (Sit-Stand Transfers) (P)  other (see comments)   BUE support  -JIL      Recorded by [JIL] Reid Mancilla, PT Student 06/20/18 1123      Row Name 06/20/18 1039             Stand-Sit Transfer    Assistive Device (Stand-Sit Transfers) (P)  other (see comments)   BUE support  -JIL      Recorded by [JIL] Reid Mancilla, PT Student 06/20/18 1123      Row Name 06/20/18 1039             Gait/Stairs Assessment/Training    [place] Level (Gait) (P)  not tested  -JIL      Comment (Gait/Stairs) (P)  Deferred due to pt functional status.  -JIL      Recorded by [JIL] Reid Mancilla, PT Student 06/20/18 1123      Row Name 06/20/18 1010             ADL Assessment/Intervention    98387 - OT Self Care/Mgmt Minutes 7  -CL      BADL Assessment/Intervention feeding  -CL      Recorded by [CL] Radha Becerril OT 06/20/18 1408      Row Name 06/20/18 1010             Self-Feeding Assessment/Training    [place] Level (Feeding) scoop food and bring to mouth;contact guard assist;verbal cues  -CL      Assistive Devices (Feeding) built-up handle utensils;adapted cup  -CL      Position (Self-Feeding) sitting up in bed  -CL      Comment (Feeding) Pt simulated w/ LH utensils w/ LUE d/t decreased RUE strength though pt is RHD.   -CL      Recorded by [CL] Radha Becerril OT 06/20/18 1408      Row Name 06/20/18 1039             General ROM    GENERAL ROM COMMENTS (P)  RLE grossly 2+/5, LLE  grossly 3/5  -JIL      Recorded by [JIL] Reid Mancilla, PT Student 06/20/18 1123      Row Name 06/20/18 1039 06/20/18 1010          Therapeutic Exercise    Therapeutic Exercise (P)  supine, lower extremities  -JIL  --     99408 - PT Therapeutic Exercise Minutes (P)  8  -JIL  --     67259 - PT Therapeutic Activity Minutes (P)  18  -JIL  --     40910 - OT Therapeutic Exercise Minutes  -- 5  -CL     Recorded by [JIL] Reid Mancilla, PT Student 06/20/18 1123 [CL] Radha Becerril, OT 06/20/18 1408     Row Name 06/20/18 1010             Upper Extremity Seated Therapeutic Exercise    Performed, Seated Upper Extremity (Therapeutic Exercise) shoulder flexion/extension;shoulder external/internal rotation;elbow flexion/extension;forearm supination/pronation;wrist flexion/extension;digit flexion/extension  -CL      Exercise Type, Seated Upper Extremity (Therapeutic Exercise) AAROM (active assistive range of motion)  -CL      Sets/Reps Detail, Seated Upper Extremity (Therapeutic Exercise) 1/10  -CL      Comment, Seated Upper Extremity (Therapeutic Exercise) RUE  -CL      Recorded by [CL] Radha Becerril, RON 06/20/18 1408      Row Name 06/20/18 1039             Lower Extremity Seated Therapeutic Exercise    Performed, Seated Lower Extremity (Therapeutic Exercise) (P)  hip flexion/extension;hip abduction/adduction;ankle dorsiflexion/plantarflexion;LAQ (long arc quad), knee extension  -JIL      Exercise Type, Seated Lower Extremity (Therapeutic Exercise) (P)  AROM (active range of motion)  -JIL      Sets/Reps Detail, Seated Lower Extremity (Therapeutic Exercise) (P)  BLE 10x each  -JIL      Recorded by [JIL] Reid Mancilla, PT Student 06/20/18 1128      Row Name 06/20/18 1039             Lower Extremity Supine Therapeutic Exercise    Performed, Supine Lower Extremity (Therapeutic Exercise) (P)  hip flexion/extension;ankle dorsiflexion/plantarflexion;knee flexion/extension  -JIL      Exercise Type, Supine Lower Extremity (Therapeutic Exercise) (P)  AROM  (active range of motion)  -JIL      Sets/Reps Detail, Supine Lower Extremity (Therapeutic Exercise) (P)  BLE 10x each  -JIL      Recorded by [JIL] Reid Mancilla, PT Student 06/20/18 1128      Row Name 06/20/18 1039             Static Sitting Balance    Level of [place] (Unsupported Sitting, Static Balance) (P)  moderate assist, 50 to 74% patient effort  -JIL      Sitting Position (Unsupported Sitting, Static Balance) (P)  sitting on edge of bed  -JB2      Time Able to Maintain Position (Unsupported Sitting, Static Balance) (P)  more than 5 minutes  -JB2      Comment (Unsupported Sitting, Static Balance) (P)  Pt progressed to periods of Min A when re-oriented to midline. Practiced WB on LUE to assist with midline orientation. Pt able to sustain midline for 1-2 min each time after period of WB on LUE (overcorrecting for midline). Pt req VC/TC as she became fatigued to sit upright and to prevent LOB to R.  -JB2      Recorded by [JIL] Reid Mancilla, PT Student 06/20/18 1349  [JB2] Reid Mancilla, PT Student 06/20/18 1128      Row Name 06/20/18 1039             Static Standing Balance    Level of [place] (Supported Standing, Static Balance) (P)  maximal assist, 25 to 49% patient effort  -JIL      Time Able to Maintain Position (Supported Standing, Static Balance) (P)  15 to 30 seconds  -JB2      Assistive Device Utilized (Supported Standing, Static Balance) (P)  other (see comments)   BUE support  -JB2      Recorded by [JIL] Reid Mancilla, PT Student 06/20/18 1349  [JB2] Reid Manclila, PT Student 06/20/18 1128      Row Name 06/20/18 1039 06/20/18 1010          Positioning and Restraints    Pre-Treatment Position (P)  in bed  -JIL in bed  -CL     Post Treatment Position (P)  chair  -JIL bed  -CL     In Bed  -- notified nsg;fowlers;call light within reach;encouraged to call for assist;exit alarm on;with family/caregiver;side rails up x3;RUE elevated;LUE elevated;legs elevated;heels elevated  -CL     In Chair (P)  notified  nsg;reclined;sitting;call light within reach;encouraged to call for assist;exit alarm on;with family/caregiver;RUE elevated;LUE elevated;waffle cushion;on mechanical lift sling;legs elevated;R heel elevated;L heel elevated  -JIL  --     Recorded by [JIL] Reid Mancilla, PT Student 06/20/18 1128 [CL] Radha Becerril, OT 06/20/18 1408     Row Name 06/20/18 1039             Pain Scale: Numbers Pre/Post-Treatment    Pain Scale: Numbers, Pretreatment (P)  5/10  -JIL      Pain Scale: Numbers, Post-Treatment (P)  5/10  -JIL      Pain Location - Side (P)  Right  -JIL      Pain Location - Orientation (P)  upper  -JIL      Pain Location (P)  extremity  -JIL      Pre/Post Treatment Pain Comment (P)  Pt c/o of increased RUE pain; RUE particularly sensitive today during t/fs and EOB activity.  -JIL      Pain Intervention(s) (P)  Repositioned;Ambulation/increased activity;Rest  -JIL      Recorded by [JIL] Reid Mancilla, PT Student 06/20/18 1128      Row Name 06/20/18 1010             Pain Scale 2: FACES Pre/Post-Treatment    Pain 2: FACES Scale, Pretreatment 2-->hurts little bit  -CL      Pain 2: FACES Scale, Post-Treatment 2-->hurts little bit  -CL      Pain Location 2 - Side Right  -CL      Pain Location 2 hand  -CL      Pre/Post Treatment Pain 2 Comment Tolerated.   -CL      Pain Intervention(s) 2 Repositioned;Ambulation/increased activity  -CL      Recorded by [CL] Radha Becerril, OT 06/20/18 1408      Row Name 06/20/18 1039             Coping    Observed Emotional State (P)  calm;cooperative  -JIL      Verbalized Emotional State (P)  acceptance  -JIL      Recorded by [JIL] Reid Mancilla, PT Student 06/20/18 1128      Row Name 06/20/18 1039             Plan of Care Review    Plan of Care Reviewed With (P)  patient;grandchild(j carlos)  -JIL      Recorded by [JIL] Reid Mancilla, PT Student 06/20/18 1128      Row Name 06/20/18 1039             Outcome Summary/Treatment Plan (PT)    Daily Summary of Progress (PT) (P)  progress toward functional goals is  good  -JIL      Recorded by [JIL] Reid Mancilla, PT Student 06/20/18 1128        User Key  (r) = Recorded By, (t) = Taken By, (c) = Cosigned By    Initials Name Effective Dates Discipline    CL Radha Becerril, OT 04/03/18 -  OT    JIL Mancilla, PT Student 05/15/18 -  PT               Occupational Therapy Education     Title: PT OT SLP Therapies (Active)     Topic: Occupational Therapy (Active)     Point: ADL training (Active)     Description: Instruct learner(s) on proper safety adaptation and remediation techniques during self care or transfers.   Instruct in proper use of assistive devices.   Learning Progress Summary     Learner Status Readiness Method Response Comment Documented by    Patient Active Acceptance E NR Pt educated on appropriate safety precautions, HEP, positioning, AE for self-feeding, and benefits of rehab.  06/20/18 1409     Active Acceptance E NR Pt educatedon RUE HEP, positioning, role of OT, t/f techniques, appropriate safety precautions, and benefits of therapy.  06/18/18 1000     Active Acceptance E NR Pt educated on appropriate safety precautions, t/f techniques, positioning, role of OT, and benefits of therapy.  06/15/18 0955    Family Active Acceptance E NR Pt educated on appropriate safety precautions, HEP, positioning, AE for self-feeding, and benefits of rehab. CL 06/20/18 1409          Point: Home exercise program (Active)     Description: Instruct learner(s) on appropriate technique for monitoring, assisting and/or progressing therapeutic exercises/activities.   Learning Progress Summary     Learner Status Readiness Method Response Comment Documented by    Patient Active Acceptance E NR Pt educated on appropriate safety precautions, HEP, positioning, AE for self-feeding, and benefits of rehab.  06/20/18 1409     Active Acceptance E NR Pt educatedon RUE HEP, positioning, role of OT, t/f techniques, appropriate safety precautions, and benefits of therapy. CL 06/18/18 1000     Family Active Acceptance E NR Pt educated on appropriate safety precautions, HEP, positioning, AE for self-feeding, and benefits of rehab. CL 06/20/18 1409          Point: Precautions (Active)     Description: Instruct learner(s) on prescribed precautions during self-care and functional transfers.   Learning Progress Summary     Learner Status Readiness Method Response Comment Documented by    Patient Active Acceptance E NR Pt educated on appropriate safety precautions, HEP, positioning, AE for self-feeding, and benefits of rehab. CL 06/20/18 1409     Active Acceptance E NR Pt educatedon RUE HEP, positioning, role of OT, t/f techniques, appropriate safety precautions, and benefits of therapy. CL 06/18/18 1000     Active Acceptance E NR Pt educated on appropriate safety precautions, t/f techniques, positioning, role of OT, and benefits of therapy. CL 06/15/18 0955    Family Active Acceptance E NR Pt educated on appropriate safety precautions, HEP, positioning, AE for self-feeding, and benefits of rehab. CL 06/20/18 1409          Point: Body mechanics (Active)     Description: Instruct learner(s) on proper positioning and spine alignment during self-care, functional mobility activities and/or exercises.   Learning Progress Summary     Learner Status Readiness Method Response Comment Documented by    Patient Active Acceptance E NR Pt educatedon RUE HEP, positioning, role of OT, t/f techniques, appropriate safety precautions, and benefits of therapy. CL 06/18/18 1000     Active Acceptance E NR Pt educated on appropriate safety precautions, t/f techniques, positioning, role of OT, and benefits of therapy. CL 06/15/18 0955                      User Key     Initials Effective Dates Name Provider Type Discipline    CL 04/03/18 -  Radha Becerril OT Occupational Therapist OT                OT Recommendation and Plan  Outcome Summary/Treatment Plan (OT)  Anticipated Equipment Needs at Discharge (OT):  (TBA further)  Anticipated  Discharge Disposition (OT): inpatient rehabilitation facility  Therapy Frequency (OT Eval): daily  Plan of Care Review  Plan of Care Reviewed With: patient, grandchild(j carlos)  Plan of Care Reviewed With: patient, grandchild(j carlos)  Outcome Summary: Pt demo improved RUE grasp this date though conts to be limited d/t more proximal RUE weakness limiting functional task performance. Pt educated on/adminstered AE to improve independence w/ self-feeding while using LUE. Recommend cont skilled IPOT POC.         Outcome Measures     Row Name 06/20/18 1039 06/20/18 1010 06/19/18 1410       How much help from another person do you currently need...    Turning from your back to your side while in flat bed without using bedrails? (P)  2  -JIL  -- 2  -JBA (r) JIL (t) JBA (c)    Moving from lying on back to sitting on the side of a flat bed without bedrails? (P)  2  -JIL  -- 2  -JBA (r) JIL (t) JBA (c)    Moving to and from a bed to a chair (including a wheelchair)? (P)  2  -JIL  -- 1  -JBA (r) JIL (t) JBA (c)    Standing up from a chair using your arms (e.g., wheelchair, bedside chair)? (P)  2  -JIL  -- 1  -JBA (r) JIL (t) JBA (c)    Climbing 3-5 steps with a railing? (P)  1  -JIL  -- 1  -JBA (r) JIL (t) JBA (c)    To walk in hospital room? (P)  1  -JIL  -- 1  -JBA (r) JIL (t) JBA (c)    AM-PAC 6 Clicks Score (P)  10  -CL (r) JIL (t)  -- 8  -JBA (r) JIL (t)       How much help from another is currently needed...    Putting on and taking off regular lower body clothing?  -- 1  -CL  --    Bathing (including washing, rinsing, and drying)  -- 1  -CL  --    Toileting (which includes using toilet bed pan or urinal)  -- 1  -CL  --    Putting on and taking off regular upper body clothing  -- 2  -CL  --    Taking care of personal grooming (such as brushing teeth)  -- 2  -CL  --    Eating meals  -- 2  -CL  --    Score  -- 9  -CL  --       Modified Hill Scale    Modified [place] Scale (P)  4 - Moderately severe disability.  Unable to walk without  assistance, and unable to attend to own bodily needs without assistance.  -JIL 4 - Moderately severe disability.  Unable to walk without assistance, and unable to attend to own bodily needs without assistance.  -CL  --       Functional Assessment    Outcome Measure Options (P)  AM-PAC 6 Clicks Basic Mobility (PT)  -JIL AM-PAC 6 Clicks Daily Activity (OT)  -CL AM-PAC 6 Clicks Basic Mobility (PT)  -JBA (r) JIL (t) JBA (c)    Row Name 06/18/18 1401 06/18/18 0837          How much help from another person do you currently need...    Turning from your back to your side while in flat bed without using bedrails? 2  -LS (r) JIL (t) LS (c)  --     Moving from lying on back to sitting on the side of a flat bed without bedrails? 2  -LS (r) JIL (t) LS (c)  --     Moving to and from a bed to a chair (including a wheelchair)? 2  -LS (r) JIL (t) LS (c)  --     Standing up from a chair using your arms (e.g., wheelchair, bedside chair)? 2  -LS (r) JIL (t) LS (c)  --     Climbing 3-5 steps with a railing? 1  -LS (r) JIL (t) LS (c)  --     To walk in hospital room? 1  -LS (r) JIL (t) LS (c)  --     AM-PAC 6 Clicks Score 10  -LS (r) JIL (t)  --        How much help from another is currently needed...    Putting on and taking off regular lower body clothing?  -- 1  -CL     Bathing (including washing, rinsing, and drying)  -- 1  -CL     Toileting (which includes using toilet bed pan or urinal)  -- 1  -CL     Putting on and taking off regular upper body clothing  -- 2  -CL     Taking care of personal grooming (such as brushing teeth)  -- 2  -CL     Eating meals  -- 1  -CL     Score  -- 8  -CL        Modified [place] Scale    Modified [place] Scale  -- 4 - Moderately severe disability.  Unable to walk without assistance, and unable to attend to own bodily needs without assistance.  -CL        Functional Assessment    Outcome Measure Options AM-PAC 6 Clicks Basic Mobility (PT)  -LS (r) JIL (t) LS (c) AM-PAC 6 Clicks Daily Activity (OT)  -CL       User  Key  (r) = Recorded By, (t) = Taken By, (c) = Cosigned By    Initials Name Provider Type    MICKEY Olivier, PT Physical Therapist    HEIDY Tobias, PT Physical Therapist    CL Radha Becerril, OT Occupational Therapist    JIL Mancilla, PT Student PT Student           Time Calculation:         Time Calculation- OT     Row Name 06/20/18 1411 06/20/18 1010          Time Calculation- OT    OT Start Time 1010  -CL  --     OT Received On 06/20/18  -CL  --     OT Goal Re-Cert Due Date 06/25/18  -CL  --        Timed Charges    41879 - OT Therapeutic Exercise Minutes  -- 5  -CL     19793 - OT Self Care/Mgmt Minutes  -- 7  -CL       User Key  (r) = Recorded By, (t) = Taken By, (c) = Cosigned By    Initials Name Provider Type    CL Radha Becerril, OT Occupational Therapist           Therapy Suggested Charges     Code   Minutes Charges    28440 (CPT®) Hc Ot Neuromusc Re Education Ea 15 Min      22240 (CPT®) Hc Ot Ther Proc Ea 15 Min 5     33404 (CPT®) Hc Gait Training Ea 15 Min      22484 (CPT®) Hc Ot Therapeutic Act Ea 15 Min      25915 (CPT®) Hc Ot Manual Therapy Ea 15 Min      22090 (CPT®) Hc Ot Iontophoresis Ea 15 Min      31649 (CPT®) Hc Ot Elec Stim Ea-Per 15 Min      62663 (CPT®) Hc Ot Ultrasound Ea 15 Min      01314 (CPT®) Hc Ot Self Care/Mgmt/Train Ea 15 Min 7 1    Total  12 1        Therapy Charges for Today     Code Description Service Date Service Provider Modifiers Qty    33775086792 HC OT SELF CARE/MGMT/TRAIN EA 15 MIN 6/20/2018 Radha Becerril OT GO 1               Radha Becerril OT  6/20/2018

## 2018-06-26 ENCOUNTER — OFFICE VISIT (OUTPATIENT)
Dept: NEUROSURGERY | Facility: CLINIC | Age: 73
End: 2018-06-26

## 2018-06-26 VITALS — TEMPERATURE: 98.7 F | DIASTOLIC BLOOD PRESSURE: 96 MMHG | SYSTOLIC BLOOD PRESSURE: 142 MMHG | HEIGHT: 65 IN

## 2018-06-26 DIAGNOSIS — I65.1 BASILAR ARTERY STENOSIS: Primary | ICD-10-CM

## 2018-06-26 PROCEDURE — 99213 OFFICE O/P EST LOW 20 MIN: CPT | Performed by: RADIOLOGY

## 2018-06-26 NOTE — PROGRESS NOTES
NAME: LIDA HARMON   DOS: 2018  : 1945  PCP: Milvia Frye MD    Chief Complaint:    Chief Complaint   Patient presents with   • Stroke     f/u basilar occlusion and thrombectomy/stent       History of Present Illness:  73 y.o. female well known to the Neurointerventional service, having been treated on multiple occasions for advanced intracranial atherosclerotic disease (ICAD) and prior basilar occlusion X 2.  She's had angioplasty/stent placement involving her intracranial right vertebral artery greater than 5 years ago ([employer] Xience).  She did very well for several years, but presented to [facility] in mid-May 2018 for recurrent ICAD and basilar thrombosis.  She was transferred to [facility], and underwent emergent neurointervention with IA tPA/thrombectomy and angioplasty of critical stenosis of right VB junction on 18.  This represented progression of disease, as her prior stents were widely patent.  She was restarted on Plavix/ASA regimen, and made an excellent recovery.       She re-presented to [facility] emergency department on 2018 with a 2 day history of progressive weakness and slurred speech.  Given her sub--acute presentation, she was not a candidate for IV TPA therapy, but MR angiography demonstrated reocclusion of her basilar artery.  Given the uniformly poor prognosis, she was taken for emergent neuro intervention with successful restoration of flow within the basilar artery with intra-arterial TPA/thrombectomy, as well as placement of a 2.25 mm Xience drug-eluting stent at a critical re-stenosis involving the right vertebrobasilar junction (recurrent stenosis, site of prior angioplasty).     Unfortunately, Ms. Harmon did suffer new ischemic strokes (tiny/punctate infarcts involving her right posterolateral medulla, prashant, and left cerebellum) related to her most recent event.  She is currently undergoing inpatient rehab at  [facility], but presents for follow-up of her stroke/stent.  Her speech has markedly improved since her hospitalization, and she's regained antigravity strength in the right upper extremity.  She still has 1+/5 strength in the right lower extremity, but was able to stand over the past few days in rehabilitation.    Past Medical History:  Past Medical History:   Diagnosis Date   • Atherosclerotic cerebrovascular disease    • Chronic kidney disease    • Hyperlipidemia    • Hypertension    • Stroke     Multiple in September 2011 and Dec 2011       Past Surgical History:  Past Surgical History:   Procedure Laterality Date   • CEREBRAL ANGIOGRAM     • CEREBRAL ANGIOGRAM N/A 5/22/2018    Procedure: Cerebral angiogram;  Surgeon: Gilberto Sears MD;  Location:  [facility] CATH INVASIVE LOCATION;  Service: Interventional Radiology   • CORONARY ARTERY BYPASS GRAFT     • INTERVENTIONAL RADIOLOGY PROCEDURE Bilateral 6/13/2018    Procedure: Carotid Cerebral Angiogram;  Surgeon: Gilberto Sears MD;  Location:  [facility] CATH INVASIVE LOCATION;  Service: Interventional Radiology   • INTRACRANIAL ARTERY ANGIOPLASTY         Review of Systems:        Review of Systems   Constitutional: Negative for activity change, appetite change, chills, diaphoresis, fatigue, fever and unexpected weight change.   HENT: Positive for drooling and trouble swallowing. Negative for congestion, dental problem, ear discharge, ear pain, facial swelling, hearing loss, mouth sores, nosebleeds, postnasal drip, rhinorrhea, sinus pressure, sneezing, sore throat, tinnitus and voice change.    Eyes: Negative for photophobia, pain, discharge, redness, itching and visual disturbance.   Respiratory: Positive for apnea and stridor. Negative for cough, choking, chest tightness, shortness of breath and wheezing.    Cardiovascular: Negative for chest pain, palpitations and leg swelling.   Gastrointestinal: Positive for constipation and rectal pain. Negative for  abdominal distention, abdominal pain, anal bleeding, blood in stool, diarrhea, nausea and vomiting.   Endocrine: Negative for cold intolerance, heat intolerance, polydipsia, polyphagia and polyuria.   Genitourinary: Positive for dysuria and frequency. Negative for decreased urine volume, difficulty urinating, enuresis, flank pain, genital sores, hematuria and urgency.   Musculoskeletal: Positive for myalgias and neck stiffness. Negative for arthralgias, back pain, gait problem, joint swelling and neck pain.   Skin: Negative for color change, pallor, rash and wound.   Allergic/Immunologic: Negative for environmental allergies, food allergies and immunocompromised state.   Neurological: Positive for speech difficulty, weakness and numbness. Negative for dizziness, tremors, seizures, syncope, facial asymmetry, light-headedness and headaches.   Hematological: Negative for adenopathy. Does not bruise/bleed easily.   Psychiatric/Behavioral: Negative for agitation, behavioral problems, confusion, decreased concentration, dysphoric mood, hallucinations, self-injury, sleep disturbance and suicidal ideas. The patient is not nervous/anxious and is not hyperactive.         Medications    Current Outpatient Prescriptions:   •  amLODIPine (NORVASC) 10 MG tablet, Take 1 tablet by mouth Daily., Disp: , Rfl:   •  aspirin 81 MG chewable tablet, Chew 1 tablet Daily., Disp: , Rfl:   •  atorvastatin (LIPITOR) 80 MG tablet, Take 1 tablet by mouth Every Night., Disp: , Rfl:   •  clopidogrel (PLAVIX) 75 MG tablet, Take 1 tablet by mouth Daily., Disp: 30 tablet, Rfl:   •  dextrose (GLUTOSE) 40 % gel, Take 15 g by mouth Every 15 (Fifteen) Minutes As Needed for Low Blood Sugar (Blood sugar less than 70)., Disp: , Rfl:   •  glucagon, human recombinant, (GLUCAGEN DIAGNOSTIC) 1 MG injection, Inject 1 mg under the skin As Needed (Blood Glucose Less Than 70)., Disp: , Rfl:   •  hydrALAZINE (APRESOLINE) 20 MG/ML injection, Infuse 1 mL into a venous  catheter Every 6 (Six) Hours As Needed for High Blood Pressure., Disp: , Rfl:   •  insulin regular (humuLIN R,novoLIN R) 100 UNIT/ML injection, Inject 0-7 Units under the skin 4 (Four) Times a Day Before Meals & at Bedtime., Disp: , Rfl: 12  •  metoprolol tartrate (LOPRESSOR) 25 MG tablet, Take 0.5 tablets by mouth Every 12 (Twelve) Hours., Disp: , Rfl:   •  ondansetron (ZOFRAN) 4 MG/2ML injection, Infuse 2 mL into a venous catheter Every 6 (Six) Hours As Needed for Nausea or Vomiting., Disp: , Rfl:     Allergies:  No Known Allergies    Social Hx:  Social History   Substance Use Topics   • Smoking status: Never Smoker   • Smokeless tobacco: Never Used   • Alcohol use No       Family Hx:  History reviewed. No pertinent family history.    Review of Imaging:  No new imaging.  Prior studies at [facility] demonstrated diffusely diseased intracranial vertebrobasilar system, with occlusion of the left vertebral artery at the skull base, and multiple stents (recent and prior) involving her intracranial right vertebral/basilar arteries.    Physical Examination:  Vitals:    06/26/18 1322   BP: 142/96   Temp: 98.7 °F (37.1 °C)        General Appearance:   Well developed, well nourished, well groomed, alert, and cooperative.  Cardiovascular: Regular rate and rhythm. No carotid bruits      Neurological examination:  Neurologic Exam     Mental Status   Oriented to person, place, and time.   Speech: (Mild residual dysarthria, but significantly improved since her hospitalization.  She is not aphasic.)  Level of consciousness: alert    Cranial Nerves     CN II   Visual fields full to confrontation.     CN III, IV, VI   Pupils are equal, round, and reactive to light.  Extraocular motions are normal.   Diplopia: Previous diplopia has resolved.    CN V   Facial sensation intact.     CN VII   Facial expression full, symmetric.     CN VIII   CN VIII normal.     CN XI   CN XI normal.     CN XII   CN XII normal.     Motor  Exam She has baseline weakness in the left lower extremity from prior stroke.  She has regained antigravity (3/5) strength in the right upper extremity, she remains weak in the right lower extremity (1+/5).     Sensory Exam   Light touch normal.     Gait, Coordination, and Reflexes She requires a wheelchair, and can stand with assistance, but is yet to regain ambulation.       Diagnoses/Plan:    Ms. Harmon is a 73 y.o. female well known to the neuro interventional service, having been treated on multiple occasions for advanced intracranial atherosclerotic disease, predominantly involving the vertebrobasilar junction.  She has presented on multiple occasions over the past 7 years or so with brainstem/posterior fossa strokes and basilar occlusions.  She most recently was admitted on 6/13/2018 with a 2 day history of progressive weakness and slurred speech.  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  Given the uniformly poor prognosis, she was taken for emergent neuro intervention with successful restoration of flow within the basilar artery with intra-arterial TPA/thrombectomy, as well as placement of a 2.25 mm Xience drug-eluting stent at a critical re-stenosis involving the right vertebrobasilar junction (recurrent stenosis, site of prior angioplasty).  Unfortunately, Ms. Harmon did suffer new ischemic strokes (tiny/punctate infarcts involving her right posterolateral medulla, prashant, and left cerebellum) related to her most recent event.     She is doing fairly well in rehabilitation, with marked improvement in her speech, regaining antigravity strength in the right upper extremity, and able to stand with assistance.  I am optimistic that with continued rehabilitation she will show further improvement.    I plan on seeing her back in 1 month's time for routine follow-up, we will obtain an MR angiogram of the  "head at that time to serve as a \"baseline\".  She will need to remain on lifelong dual antiplatelet therapy, along with statin therapy.                  "

## 2018-07-25 ENCOUNTER — HOSPITAL ENCOUNTER (OUTPATIENT)
Dept: MRI IMAGING | Facility: HOSPITAL | Age: 73
Discharge: HOME | End: 2018-07-25
Attending: RADIOLOGY

## 2018-07-25 ENCOUNTER — APPOINTMENT (OUTPATIENT)
Dept: MRI IMAGING | Facility: HOSPITAL | Age: 73
End: 2018-07-25
Attending: RADIOLOGY

## 2018-08-01 ENCOUNTER — OFFICE VISIT (OUTPATIENT)
Dept: NEUROSURGERY | Facility: CLINIC | Age: 73
End: 2018-08-01

## 2018-08-01 ENCOUNTER — HOSPITAL ENCOUNTER (OUTPATIENT)
Dept: MRI IMAGING | Facility: HOSPITAL | Age: 73
Discharge: HOME | End: 2018-08-01
Attending: RADIOLOGY | Admitting: RADIOLOGY

## 2018-08-01 VITALS — TEMPERATURE: 98.5 F | SYSTOLIC BLOOD PRESSURE: 188 MMHG | HEIGHT: 65 IN | DIASTOLIC BLOOD PRESSURE: 104 MMHG

## 2018-08-01 DIAGNOSIS — I63.22 BASILAR ARTERY STENOSIS WITH INFARCTION (HCC): Primary | ICD-10-CM

## 2018-08-01 DIAGNOSIS — I65.1 BASILAR ARTERY STENOSIS: ICD-10-CM

## 2018-08-01 PROCEDURE — 99213 OFFICE O/P EST LOW 20 MIN: CPT | Performed by: RADIOLOGY

## 2018-08-01 PROCEDURE — 70544 MR ANGIOGRAPHY HEAD W/O DYE: CPT

## 2018-08-01 NOTE — PROGRESS NOTES
NAME: LIDA HARMON   DOS: 2018  : 1945  PCP: Milvia Frye MD    Chief Complaint:    Chief Complaint   Patient presents with   • Stroke     s/p basilar thrombectomy and stent       History of Present Illness:  73 y.o. female well known to the Neurointerventional service, having been treated on multiple occasions for advanced intracranial atherosclerotic disease (ICAD) and prior basilar occlusion X 2.  She's had angioplasty/stent placement involving her intracranial right vertebral artery greater than 5 years ago ([facility] Xience).  She did very well for several years, but presented to [facility] in mid-May 2018 for recurrent ICAD and basilar thrombosis.  She was transferred to [facility], and underwent emergent neurointervention with IA tPA/thrombectomy and angioplasty of critical stenosis of right VB junction on 18.  This represented progression of disease, as her prior stents were widely patent.  She was restarted on Plavix/ASA regimen, and made an excellent recovery.       She re-presented to [facility] emergency department on 2018 with a 2 day history of progressive weakness and slurred speech.  Given her sub--acute presentation, she was not a candidate for IV TPA therapy, but MR angiography demonstrated reocclusion of her basilar artery.  Given the uniformly poor prognosis, she was taken for emergent neuro intervention with successful restoration of flow within the basilar artery with intra-arterial TPA/thrombectomy, as well as placement of a 2.25 mm Xience drug-eluting stent at a critical re-stenosis involving the right vertebrobasilar junction (recurrent stenosis, site of prior angioplasty).     Unfortunately, Ms. Harmon did suffer new ischemic strokes (tiny/punctate infarcts involving her right posterolateral medulla, prashant, and left cerebellum) related to her most recent event.  She is currently undergoing inpatient rehab at [facility]  and presents for follow-up of her stroke/stent.  Her speech has markedly improved since her hospitalization, and she's progressed with therapy since I saw her last, regaining antigravity strength in the bilateral lower extremities.  She is able to ambulate with assistance during rehabilitation.      Past Medical History:  Past Medical History:   Diagnosis Date   • Atherosclerotic cerebrovascular disease    • Chronic kidney disease    • Hyperlipidemia    • Hypertension    • Stroke (CMS/HCC)     Multiple in September 2011 and Dec 2011       Past Surgical History:  Past Surgical History:   Procedure Laterality Date   • CEREBRAL ANGIOGRAM     • CEREBRAL ANGIOGRAM N/A 5/22/2018    Procedure: Cerebral angiogram;  Surgeon: Gilberto Sears MD;  Location:  [facility] CATH INVASIVE LOCATION;  Service: Interventional Radiology   • CORONARY ARTERY BYPASS GRAFT     • INTERVENTIONAL RADIOLOGY PROCEDURE Bilateral 6/13/2018    Procedure: Carotid Cerebral Angiogram;  Surgeon: Gilberto Sears MD;  Location:  [facility] CATH INVASIVE LOCATION;  Service: Interventional Radiology   • INTRACRANIAL ARTERY ANGIOPLASTY         Review of Systems:        Review of Systems   Constitutional: Negative for activity change, appetite change, chills, diaphoresis, fatigue, fever and unexpected weight change.   HENT: Negative for congestion, dental problem, drooling, ear discharge, ear pain, facial swelling, hearing loss, mouth sores, nosebleeds, postnasal drip, rhinorrhea, sinus pressure, sneezing, sore throat, tinnitus, trouble swallowing and voice change.    Eyes: Negative for photophobia, pain, discharge, redness, itching and visual disturbance.   Respiratory: Negative for apnea, cough, choking, chest tightness, shortness of breath, wheezing and stridor.    Cardiovascular: Negative for chest pain, palpitations and leg swelling.   Gastrointestinal: Positive for constipation. Negative for abdominal distention, abdominal pain, anal bleeding, blood in stool,  diarrhea, nausea, rectal pain and vomiting.   Endocrine: Negative for cold intolerance, heat intolerance, polydipsia, polyphagia and polyuria.   Genitourinary: Negative for decreased urine volume, difficulty urinating, dysuria, enuresis, flank pain, frequency, genital sores, hematuria and urgency.   Musculoskeletal: Negative for arthralgias, back pain, gait problem, joint swelling, myalgias, neck pain and neck stiffness.   Skin: Negative for color change, pallor, rash and wound.   Allergic/Immunologic: Negative for environmental allergies, food allergies and immunocompromised state.   Neurological: Negative for dizziness, tremors, seizures, syncope, facial asymmetry, speech difficulty, weakness, light-headedness, numbness and headaches.   Hematological: Negative for adenopathy. Does not bruise/bleed easily.   Psychiatric/Behavioral: Negative for agitation, behavioral problems, confusion, decreased concentration, dysphoric mood, hallucinations, self-injury, sleep disturbance and suicidal ideas. The patient is not nervous/anxious and is not hyperactive.         Medications    Current Outpatient Prescriptions:   •  amLODIPine (NORVASC) 10 MG tablet, Take 1 tablet by mouth Daily., Disp: , Rfl:   •  aspirin 81 MG chewable tablet, Chew 1 tablet Daily., Disp: , Rfl:   •  atorvastatin (LIPITOR) 80 MG tablet, Take 1 tablet by mouth Every Night., Disp: , Rfl:   •  clopidogrel (PLAVIX) 75 MG tablet, Take 1 tablet by mouth Daily., Disp: 30 tablet, Rfl:   •  dextrose (GLUTOSE) 40 % gel, Take 15 g by mouth Every 15 (Fifteen) Minutes As Needed for Low Blood Sugar (Blood sugar less than 70)., Disp: , Rfl:   •  hydrALAZINE (APRESOLINE) 20 MG/ML injection, Infuse 1 mL into a venous catheter Every 6 (Six) Hours As Needed for High Blood Pressure., Disp: , Rfl:   •  metoprolol tartrate (LOPRESSOR) 25 MG tablet, Take 0.5 tablets by mouth Every 12 (Twelve) Hours., Disp: , Rfl:   •  ondansetron (ZOFRAN) 4 MG/2ML injection, Infuse 2 mL into  a venous catheter Every 6 (Six) Hours As Needed for Nausea or Vomiting., Disp: , Rfl:     Allergies:  No Known Allergies    Social Hx:  Social History   Substance Use Topics   • Smoking status: Never Smoker   • Smokeless tobacco: Never Used   • Alcohol use No       Family Hx:  History reviewed. No pertinent family history.    Review of Imaging:  Mary angiogram of the head dated 8/1/2018 compared to prior studies of [facility], this reveals stable to improved appearance of the posterior circulation, status post angioplasty/stent placement.  There is artifact from the stents, but there is preserved flow within the vertebrobasilar system.    Physical Examination:  Vitals:    08/01/18 1159   BP: (!) 188/104   Temp: 98.5 °F (36.9 °C)        General Appearance:   Well developed, well nourished, well groomed, alert, and cooperative.  Cardiovascular: Regular rate and rhythm. No carotid bruits      Neurological examination:  Neurologic Exam     Mental Status   Oriented to person, place, and time.   Speech: (Minimal residual dysarthria)  Level of consciousness: alert    Cranial Nerves   Cranial nerves II through XII intact.     Motor Exam 4/5 strength in the right upper extremity, slightly diminished  strength relative to left side.  Left upper strength is normal.  There is diminished strength in the bilateral lower extremities, but she has improved significantly since I saw her last, with now antigravity strength (right greater than left).     Sensory Exam   Light touch normal.     Gait, Coordination, and Reflexes Able to stand, but requires assistance for ambulation.  However, again this represents a significant improvement with physical therapy since I saw her last.       Diagnoses/Plan:    Ms. Harmon is a 73 y.o. female female well known to the neuro interventional service, having been treated on multiple occasions for advanced intracranial atherosclerotic disease, predominantly involving the  vertebrobasilar junction.  She has presented on multiple occasions over the past 7 years or so with brainstem/posterior fossa strokes and basilar occlusions.  She most recently was admitted on 6/13/2018 with a 2 day history of progressive weakness and slurred speech.  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  Given the uniformly poor prognosis, she was taken for emergent neuro intervention with successful restoration of flow within the basilar artery with intra-arterial TPA/thrombectomy, as well as placement of a 2.25 mm Xience drug-eluting stent at a critical re-stenosis involving the right vertebrobasilar junction (recurrent stenosis, site of prior angioplasty).  Unfortunately, Ms. Harmon did suffer new ischemic strokes (tiny/punctate infarcts involving her right posterolateral medulla, prashant, and left cerebellum) related to her most recent event.      She has made significant improvements since I saw her last with rehabilitation both at [facility] and now at the [place].  I do think that with continued physical therapy she will show further improvement, and most likely regain the ability to ambulate unassisted.  Please let me know if there is anything else I can do to facilitate further rehabilitation for Ms. Harmon, as I anticipate her being able to make significant recovery over the next several months.    MR angiogram of the head today demonstrates a stable to slightly improved appearance of her posterior circulation, with patent stents and intracranial vertebrobasilar system.  She will need to remain on lifelong dual antiplatelet therapy, along with statin therapy.  I plan on seeing her back in 2 months time for routine follow-up.    She is doing fairly well in rehabilitation, with marked improvement in her speech, regaining antigravity strength in the right upper extremity, and able to stand with  assistance.  I am optimistic that with continued rehabilitation she will show further improvement.

## 2018-10-03 ENCOUNTER — OFFICE VISIT (OUTPATIENT)
Dept: NEUROSURGERY | Facility: CLINIC | Age: 73
End: 2018-10-03

## 2018-10-03 VITALS — HEIGHT: 65 IN | DIASTOLIC BLOOD PRESSURE: 92 MMHG | SYSTOLIC BLOOD PRESSURE: 144 MMHG | TEMPERATURE: 98.2 F

## 2018-10-03 DIAGNOSIS — I65.1 BASILAR ARTERY STENOSIS/OCCLUSION: Primary | ICD-10-CM

## 2018-10-03 PROCEDURE — 99213 OFFICE O/P EST LOW 20 MIN: CPT | Performed by: RADIOLOGY

## 2018-10-03 RX ORDER — METOPROLOL TARTRATE 50 MG/1
50 TABLET, FILM COATED ORAL EVERY 12 HOURS SCHEDULED
Qty: 180 TABLET | Refills: 5 | Status: SHIPPED | OUTPATIENT
Start: 2018-10-03

## 2018-10-03 RX ORDER — HYDRALAZINE HYDROCHLORIDE 50 MG/1
1 TABLET, FILM COATED ORAL 3 TIMES DAILY
Refills: 0 | COMMUNITY
Start: 2018-08-10 | End: 2018-10-03 | Stop reason: SDUPTHER

## 2018-10-03 RX ORDER — METOPROLOL TARTRATE 50 MG/1
TABLET, FILM COATED ORAL
Refills: 0 | COMMUNITY
Start: 2018-08-10 | End: 2018-10-03 | Stop reason: SDUPTHER

## 2018-10-03 RX ORDER — ASPIRIN 81 MG/1
81 TABLET, CHEWABLE ORAL DAILY
Qty: 90 TABLET | Refills: 5
Start: 2018-10-03 | End: 2019-01-02 | Stop reason: SDUPTHER

## 2018-10-03 RX ORDER — ATORVASTATIN CALCIUM 40 MG/1
40 TABLET, FILM COATED ORAL NIGHTLY
Qty: 90 TABLET | Refills: 5
Start: 2018-10-03 | End: 2019-01-02 | Stop reason: SDUPTHER

## 2018-10-03 RX ORDER — HYDRALAZINE HYDROCHLORIDE 50 MG/1
50 TABLET, FILM COATED ORAL 3 TIMES DAILY
Qty: 270 TABLET | Refills: 5 | Status: SHIPPED | OUTPATIENT
Start: 2018-10-03 | End: 2019-12-15 | Stop reason: SDUPTHER

## 2018-10-03 RX ORDER — AMLODIPINE BESYLATE 10 MG/1
10 TABLET ORAL
Qty: 90 TABLET | Refills: 5
Start: 2018-10-03

## 2018-10-03 NOTE — PROGRESS NOTES
NAME: LIDA HARMON   DOS: 10/3/2018  : 1945  PCP: Milvia Frye MD    Chief Complaint:    Chief Complaint   Patient presents with   • f/u basilar occlusion and thrombectomy/stent       History of Present Illness:  73 y.o. female well known to the Neurointerventional service, having been treated on multiple occasions for advanced intracranial atherosclerotic disease (ICAD) and prior basilar occlusion X 2.  She's had angioplasty/stent placement involving her intracranial right vertebral artery greater than 5 years ago (Wingspan & Xience).  She did very well for several years, but presented to [facility] in mid-May 2018 for recurrent ICAD and basilar thrombosis.  She was transferred to [facility], and underwent emergent neurointervention with IA tPA/thrombectomy and angioplasty of critical stenosis of right VB junction on 18.  This represented progression of disease, as her prior stents were widely patent.  She was restarted on Plavix/ASA regimen, and made an excellent recovery.       She re-presented to [facility] emergency department on 2018 with a 2 day history of progressive weakness and slurred speech.  Given her sub--acute presentation, she was not a candidate for IV TPA therapy, but MR angiography demonstrated reocclusion of her basilar artery.  Given the uniformly poor prognosis, she was taken for emergent neuro intervention with successful restoration of flow within the basilar artery with intra-arterial TPA/thrombectomy, as well as placement of a 2.25 mm Xience drug-eluting stent at a critical re-stenosis involving the right vertebrobasilar junction (recurrent stenosis, site of prior angioplasty).     Unfortunately, Ms. Harmon did suffer new ischemic strokes (tiny/punctate infarcts involving her right posterolateral medulla, prashant, and left cerebellum) related to her most recent event.  She is currently undergoing inpatient rehab at [facility]  and presents for follow-up of her stroke/stent.  Her speech deficits have essentially resolved, and she's continued to show improvement since I saw her last, and while she can still not ambulate unassisted, the strength in her bilateral lower extremities is noticeably improved.  She's experienced no new strokelike symptoms, and presents today for routine follow-up.    Past Medical History:  Past Medical History:   Diagnosis Date   • Atherosclerotic cerebrovascular disease    • Chronic kidney disease    • Hyperlipidemia    • Hypertension    • Stroke (CMS/HCC)     Multiple in September 2011 and Dec 2011       Past Surgical History:  Past Surgical History:   Procedure Laterality Date   • CEREBRAL ANGIOGRAM     • CEREBRAL ANGIOGRAM N/A 5/22/2018    Procedure: Cerebral angiogram;  Surgeon: Gilberto Sears MD;  Location:  [facility] CATH INVASIVE LOCATION;  Service: Interventional Radiology   • CORONARY ARTERY BYPASS GRAFT     • INTERVENTIONAL RADIOLOGY PROCEDURE Bilateral 6/13/2018    Procedure: Carotid Cerebral Angiogram;  Surgeon: Gilberto Sears MD;  Location:  [facility] CATH INVASIVE LOCATION;  Service: Interventional Radiology   • INTRACRANIAL ARTERY ANGIOPLASTY         Review of Systems:        Review of Systems   Constitutional: Negative for activity change, appetite change, chills, diaphoresis, fatigue, fever and unexpected weight change.   HENT: Negative for congestion, dental problem, drooling, ear discharge, ear pain, facial swelling, hearing loss, mouth sores, nosebleeds, postnasal drip, rhinorrhea, sinus pressure, sneezing, sore throat, tinnitus, trouble swallowing and voice change.    Eyes: Negative for photophobia, pain, discharge, redness, itching and visual disturbance.   Respiratory: Negative for apnea, cough, choking, chest tightness, shortness of breath, wheezing and stridor.    Cardiovascular: Negative for chest pain, palpitations and leg swelling.   Gastrointestinal: Negative for abdominal distention,  abdominal pain, anal bleeding, blood in stool, constipation, diarrhea, nausea, rectal pain and vomiting.   Endocrine: Negative for cold intolerance, heat intolerance, polydipsia, polyphagia and polyuria.   Genitourinary: Negative for decreased urine volume, difficulty urinating, dysuria, enuresis, flank pain, frequency, genital sores, hematuria and urgency.   Musculoskeletal: Negative for arthralgias, back pain, gait problem, joint swelling, myalgias, neck pain and neck stiffness.   Skin: Negative for color change, pallor, rash and wound.   Allergic/Immunologic: Negative for environmental allergies, food allergies and immunocompromised state.   Neurological: Negative for dizziness, tremors, seizures, syncope, facial asymmetry, speech difficulty, weakness, light-headedness, numbness and headaches.   Hematological: Negative for adenopathy. Does not bruise/bleed easily.   Psychiatric/Behavioral: Negative for agitation, behavioral problems, confusion, decreased concentration, dysphoric mood, hallucinations, self-injury, sleep disturbance and suicidal ideas. The patient is not nervous/anxious and is not hyperactive.         Medications    Current Outpatient Prescriptions:   •  amLODIPine (NORVASC) 10 MG tablet, Take 1 tablet by mouth Daily., Disp: 90 tablet, Rfl: 5  •  aspirin 81 MG chewable tablet, Chew 1 tablet Daily., Disp: 90 tablet, Rfl: 5  •  atorvastatin (LIPITOR) 40 MG tablet, Take 1 tablet by mouth Every Night., Disp: 90 tablet, Rfl: 5  •  clopidogrel (PLAVIX) 75 MG tablet, Take 1 tablet by mouth Daily., Disp: 30 tablet, Rfl:   •  dextrose (GLUTOSE) 40 % gel, Take 15 g by mouth Every 15 (Fifteen) Minutes As Needed for Low Blood Sugar (Blood sugar less than 70)., Disp: , Rfl:   •  hydrALAZINE (APRESOLINE) 50 MG tablet, Take 1 tablet by mouth 3 (Three) Times a Day., Disp: 270 tablet, Rfl: 5  •  metoprolol tartrate (LOPRESSOR) 50 MG tablet, Take 1 tablet by mouth Every 12 (Twelve) Hours., Disp: 180 tablet, Rfl:  5  •  ondansetron (ZOFRAN) 4 MG/2ML injection, Infuse 2 mL into a venous catheter Every 6 (Six) Hours As Needed for Nausea or Vomiting., Disp: , Rfl:     Allergies:  No Known Allergies    Social Hx:  Social History   Substance Use Topics   • Smoking status: Never Smoker   • Smokeless tobacco: Never Used   • Alcohol use No       Family Hx:  History reviewed. No pertinent family history.    Review of Imaging:  No new imaging    Physical Examination:  Vitals:    10/03/18 1221   BP: 144/92   Temp: 98.2 °F (36.8 °C)        General Appearance:   Well developed, well nourished, well groomed, alert, and cooperative.  Cardiovascular: Regular rate and rhythm. No carotid bruits      Neurological examination:  Neurologic Exam     Mental Status   Oriented to person, place, and time.   Speech: (Minimal dysarthria)  Level of consciousness: alert    Cranial Nerves   Cranial nerves II through XII intact.     Motor Exam 4/5 BUE and BLE     Sensory Exam   Light touch normal.     Gait, Coordination, and Reflexes Can ambulate with assistance of physical therapy, but can still not ambulate unassisted (even with a walker) secondary to weakness.  She uses a wheelchair for longer distances.  She is able to transition herself from bed to wheelchair unassisted.       Diagnoses/Plan:    Ms. Harmon is a 73 y.o. female  well known to the neuro interventional service, having been treated on multiple occasions for advanced intracranial atherosclerotic disease, predominantly involving the vertebrobasilar junction.  She has presented on multiple occasions over the past 7 years or so with brainstem/posterior fossa strokes and basilar occlusions.  She most recently was admitted on 6/13/2018 with a 2 day history of progressive weakness and slurred speech.  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  Given the uniformly poor prognosis, she was  taken for emergent neuro intervention with successful restoration of flow within the basilar artery with intra-arterial TPA/thrombectomy, as well as placement of a 2.25 mm Xience drug-eluting stent at a critical re-stenosis involving the right vertebrobasilar junction (recurrent stenosis, site of prior angioplasty).  Unfortunately, Ms. Harmon did suffer new ischemic strokes (tiny/punctate infarcts involving her right posterolateral medulla, prashant, and left cerebellum) related to her most recent event.      She has made significant improvements since I saw her last with rehabilitation both at [facility] and now at the [facility].  I do think that with continued physical therapy she will show further improvement, and most likely regain the ability to ambulate unassisted. Please let me know if there is anything else I can do to facilitate further rehabilitation for Ms. Harmon, as I anticipate her being continue to show improvements in her disability (and likely being able to regain ambulatory status) with further rehabilitation.     She will need to remain on lifelong dual antiplatelet regimen and statin therapy.  I plan on seeing her back in 3 months time with MR angiogram of the head to ensure stability and exclude any progression/recurrence of disease that might necessitate further treatment/intervention.

## 2018-10-15 ENCOUNTER — TELEPHONE (OUTPATIENT)
Dept: NEUROSURGERY | Facility: CLINIC | Age: 73
End: 2018-10-15

## 2018-10-15 DIAGNOSIS — I65.1 BASILAR ARTERY STENOSIS/OCCLUSION: Primary | ICD-10-CM

## 2018-10-15 NOTE — TELEPHONE ENCOUNTER
Provider:  Renu  Caller: GREGORY Garcia  Time of call:  307   Phone #: [phone]   Last visit:  10/3/18   Next visit:  1/2/19     Reason for call:         Jose Alonso PT, left messages requesting a new  PT order be faxed to his office on behalf of the pt. He states that she was doing PT while at the [facility] but has since returned home and requires more therapy. If approved this can be faxed to .

## 2018-12-28 ENCOUNTER — HOSPITAL ENCOUNTER (OUTPATIENT)
Dept: MRI IMAGING | Facility: HOSPITAL | Age: 73
Discharge: HOME | End: 2018-12-28
Attending: RADIOLOGY | Admitting: RADIOLOGY

## 2018-12-28 DIAGNOSIS — I65.1 BASILAR ARTERY STENOSIS/OCCLUSION: ICD-10-CM

## 2018-12-28 PROCEDURE — 0 GADOBENATE DIMEGLUMINE 529 MG/ML SOLUTION: Performed by: RADIOLOGY

## 2018-12-28 PROCEDURE — A9577 INJ MULTIHANCE: HCPCS | Performed by: RADIOLOGY

## 2018-12-28 PROCEDURE — 70546 MR ANGIOGRAPH HEAD W/O&W/DYE: CPT

## 2018-12-28 RX ADMIN — GADOBENATE DIMEGLUMINE 14 ML: 529 INJECTION, SOLUTION INTRAVENOUS at 12:22

## 2019-01-02 ENCOUNTER — OFFICE VISIT (OUTPATIENT)
Dept: NEUROSURGERY | Facility: CLINIC | Age: 74
End: 2019-01-02

## 2019-01-02 VITALS
DIASTOLIC BLOOD PRESSURE: 98 MMHG | TEMPERATURE: 99.3 F | HEIGHT: 65 IN | SYSTOLIC BLOOD PRESSURE: 175 MMHG | BODY MASS INDEX: 26.13 KG/M2

## 2019-01-02 DIAGNOSIS — I65.1 BASILAR ARTERY STENOSIS/OCCLUSION: ICD-10-CM

## 2019-01-02 DIAGNOSIS — I65.1 BASILAR ARTERY STENOSIS: Primary | ICD-10-CM

## 2019-01-02 PROCEDURE — 99213 OFFICE O/P EST LOW 20 MIN: CPT | Performed by: RADIOLOGY

## 2019-01-02 RX ORDER — ATORVASTATIN CALCIUM 40 MG/1
40 TABLET, FILM COATED ORAL NIGHTLY
Qty: 90 TABLET | Refills: 5
Start: 2019-01-02 | End: 2019-08-07 | Stop reason: SDUPTHER

## 2019-01-02 RX ORDER — ASPIRIN 81 MG/1
81 TABLET, CHEWABLE ORAL DAILY
Qty: 90 TABLET | Refills: 5
Start: 2019-01-02

## 2019-01-02 RX ORDER — CLOPIDOGREL BISULFATE 75 MG/1
75 TABLET ORAL DAILY
Qty: 30 TABLET | Refills: 5 | Status: SHIPPED | OUTPATIENT
Start: 2019-01-02 | End: 2019-08-07 | Stop reason: SDUPTHER

## 2019-01-02 NOTE — PROGRESS NOTES
NAME: LIDA HARMON   DOS: 2019  : 1945  PCP: Milvia Frye MD    Chief Complaint:    Chief Complaint   Patient presents with   • Basilar artery stenois/occlusion       History of Present Illness:  73 y.o. female well known to the Neurointerventional service, having been treated on multiple occasions for advanced intracranial atherosclerotic disease (ICAD) and prior basilar occlusion X 2.  She's had angioplasty/stent placement involving her intracranial right vertebral artery greater than 5 years ago ([facility]pan & Xience).  She did very well for several years, but presented to [facility] in mid-May 2018 for recurrent ICAD and basilar thrombosis.  She was transferred to [facility], and underwent emergent neurointervention with IA tPA/thrombectomy and angioplasty of critical stenosis of right VB junction on 18.  This represented progression of disease, as her prior stents were widely patent.  She was restarted on Plavix/ASA regimen, and made an excellent recovery.       She re-presented to [facility] emergency department on 2018 with a 2 day history of progressive weakness and slurred speech.  Given her sub--acute presentation, she was not a candidate for IV TPA therapy, but MR angiography demonstrated reocclusion of her basilar artery.  Given the uniformly poor prognosis, she was taken for emergent neuro intervention with successful restoration of flow within the basilar artery with intra-arterial TPA/thrombectomy, as well as placement of a 2.25 mm Xience drug-eluting stent at a critical re-stenosis involving the right vertebrobasilar junction (recurrent stenosis, site of prior angioplasty).     Unfortunately, Ms. Harmon did suffer new ischemic strokes (tiny/punctate infarcts involving her right posterolateral medulla, prashant, and left cerebellum) related to her most recent stroke in 2018, but has continued to show significant improvement since  I saw her last.  She has regained antigravity strength in the bilateral lower extremities, and is able to ambulate some with assistance of a walker.  Her speech is clear, and she's tolerating by mouth intake very well.  She remains on a lifelong dual antiplatelet regimen.  She has experienced no new stroke symptoms or deficits, and presents today for routine follow-up.    Past Medical History:  Past Medical History:   Diagnosis Date   • Atherosclerotic cerebrovascular disease    • Chronic kidney disease    • Hyperlipidemia    • Hypertension    • Stroke (CMS/HCC)     Multiple in September 2011 and Dec 2011       Past Surgical History:  Past Surgical History:   Procedure Laterality Date   • CEREBRAL ANGIOGRAM     • CEREBRAL ANGIOGRAM N/A 5/22/2018    Procedure: Cerebral angiogram;  Surgeon: Gilberto Sears MD;  Location:  [facility] C[facility] INVASIVE LOCATION;  Service: Interventional Radiology   • CORONARY ARTERY BYPASS GRAFT     • INTERVENTIONAL RADIOLOGY PROCEDURE Bilateral 6/13/2018    Procedure: Carotid Cerebral Angiogram;  Surgeon: Gilberto Sears MD;  Location:  [facility] INVASIVE LOCATION;  Service: Interventional Radiology   • INTRACRANIAL ARTERY ANGIOPLASTY         Review of Systems:        Review of Systems   Constitutional: Negative for activity change, appetite change, chills, diaphoresis, fatigue, fever and unexpected weight change.   HENT: Negative for congestion, dental problem, drooling, ear discharge, ear pain, facial swelling, hearing loss, mouth sores, nosebleeds, postnasal drip, rhinorrhea, sinus pressure, sneezing, sore throat, tinnitus, trouble swallowing and voice change.    Eyes: Negative for photophobia, pain, discharge, redness, itching and visual disturbance.   Respiratory: Positive for cough, choking and shortness of breath. Negative for apnea, chest tightness, wheezing and stridor.    Cardiovascular: Negative for chest pain, palpitations and leg swelling.   Gastrointestinal: Negative for  abdominal distention, abdominal pain, anal bleeding, blood in stool, constipation, diarrhea, nausea, rectal pain and vomiting.   Endocrine: Negative for cold intolerance, heat intolerance, polydipsia, polyphagia and polyuria.   Genitourinary: Positive for flank pain. Negative for decreased urine volume, difficulty urinating, dysuria, enuresis, frequency, genital sores, hematuria and urgency.   Musculoskeletal: Positive for myalgias. Negative for arthralgias, back pain, gait problem, joint swelling, neck pain and neck stiffness.   Skin: Negative for color change, pallor, rash and wound.   Allergic/Immunologic: Negative for environmental allergies, food allergies and immunocompromised state.   Neurological: Positive for dizziness and numbness. Negative for tremors, seizures, syncope, facial asymmetry, speech difficulty, weakness, light-headedness and headaches.   Hematological: Negative for adenopathy. Does not bruise/bleed easily.   Psychiatric/Behavioral: Negative for agitation, behavioral problems, confusion, decreased concentration, dysphoric mood, hallucinations, self-injury, sleep disturbance and suicidal ideas. The patient is not nervous/anxious and is not hyperactive.         Medications    Current Outpatient Medications:   •  amLODIPine (NORVASC) 10 MG tablet, Take 1 tablet by mouth Daily., Disp: 90 tablet, Rfl: 5  •  aspirin 81 MG chewable tablet, Chew 1 tablet Daily., Disp: 90 tablet, Rfl: 5  •  atorvastatin (LIPITOR) 40 MG tablet, Take 1 tablet by mouth Every Night., Disp: 90 tablet, Rfl: 5  •  clopidogrel (PLAVIX) 75 MG tablet, Take 1 tablet by mouth Daily., Disp: 30 tablet, Rfl: 5  •  dextrose (GLUTOSE) 40 % gel, Take 15 g by mouth Every 15 (Fifteen) Minutes As Needed for Low Blood Sugar (Blood sugar less than 70)., Disp: , Rfl:   •  hydrALAZINE (APRESOLINE) 50 MG tablet, Take 1 tablet by mouth 3 (Three) Times a Day., Disp: 270 tablet, Rfl: 5  •  metoprolol tartrate (LOPRESSOR) 50 MG tablet, Take 1  tablet by mouth Every 12 (Twelve) Hours., Disp: 180 tablet, Rfl: 5  •  ondansetron (ZOFRAN) 4 MG/2ML injection, Infuse 2 mL into a venous catheter Every 6 (Six) Hours As Needed for Nausea or Vomiting., Disp: , Rfl:     Allergies:  No Known Allergies    Social Hx:  Social History     Tobacco Use   • Smoking status: Never Smoker   • Smokeless tobacco: Never Used   Substance Use Topics   • Alcohol use: No   • Drug use: No       Family Hx:  History reviewed. No pertinent family history.    Review of Imaging:  MR angiogram of the head with and without contrast dated 12/28/2018 was reviewed along with its corresponding radiologic report.  Comparison is made to multiple prior MR angiograms as well as catheter angiogram's, the most recent being on 8/1/2018.  Given differences in technique, there is been no change in appearance of the intracranial vasculature.  There are areas of signal loss/artifact related 2 stents within the intracranial portions of the right vertebral artery and at the vertebrobasilar junction, but the right vertebral artery and basilar artery remained patent.  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  Atherosclerotic change involving the anterior circulation remains table.      Physical Examination:  Vitals:    01/02/19 1424   BP: 175/98   Temp: 99.3 °F (37.4 °C)        General Appearance:   Well developed, well nourished, well groomed, alert, and cooperative.  Cardiovascular: Regular rate and rhythm. No carotid bruits      Neurological examination:  Neurologic Exam     Mental Status   Oriented to person, place, and time.   Speech: speech is normal   Level of consciousness: alert    Cranial Nerves   Cranial nerves II through XII intact.     Motor Exam She has regained 4/5 strength in the bilateral upper and lower extremities.     Sensory Exam   Light touch normal.     Gait, Coordination, and Reflexes She still relies heavily on a wheelchair to get around, but is able to ambulate some  unassisted (utilizing a walker and holding onto things around her house)       Diagnoses/Plan:    Ms. Harmon is a 73 y.o. female well known to the neuro interventional service, having been treated on multiple occasions for advanced intracranial atherosclerotic disease, predominantly involving the vertebrobasilar junction.  She has presented on multiple occasions over the past 7 years or so with brainstem/posterior fossa strokes and basilar occlusions.  She most recently was admitted on 6/13/2018 with a 2 day history of progressive weakness and slurred speech.  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  Given the uniformly poor prognosis, she was taken for emergent neuro intervention with successful restoration of flow within the basilar artery with intra-arterial TPA/thrombectomy, as well as placement of a 2.25 mm Xience drug-eluting stent at a critical re-stenosis involving the right vertebrobasilar junction (recurrent stenosis, site of prior angioplasty).  Unfortunately, Ms. Harmon did suffer new ischemic strokes (tiny/punctate infarcts involving her right posterolateral medulla, prashant, and left cerebellum) related to her most recent event.      She has made significant improvements since I saw her last with rehabilitation both at [facility], and I suspect that she will continue to show some improvement over time.  She will continue to benefit from any of physical therapy/rehabilitation options that are available to her.  She will need to remain on lifelong dual antiplatelet regimen and statin therapy.  I plan on seeing her back in 6 months time with MR angiogram of the head to ensure stability and exclude any progression/recurrence of disease that might necessitate further treatment/intervention.

## 2019-07-25 ENCOUNTER — APPOINTMENT (OUTPATIENT)
Dept: MRI IMAGING | Facility: HOSPITAL | Age: 74
End: 2019-07-25

## 2019-08-01 ENCOUNTER — HOSPITAL ENCOUNTER (OUTPATIENT)
Dept: MRI IMAGING | Facility: HOSPITAL | Age: 74
Discharge: HOME | End: 2019-08-01
Admitting: RADIOLOGY

## 2019-08-01 DIAGNOSIS — I65.1 BASILAR ARTERY STENOSIS: ICD-10-CM

## 2019-08-01 LAB — CREAT BLDA-MCNC: 1 MG/DL (ref 0.6–1.3)

## 2019-08-01 PROCEDURE — 70546 MR ANGIOGRAPH HEAD W/O&W/DYE: CPT

## 2019-08-01 PROCEDURE — 82565 ASSAY OF CREATININE: CPT

## 2019-08-01 PROCEDURE — 0 GADOBENATE DIMEGLUMINE 529 MG/ML SOLUTION: Performed by: RADIOLOGY

## 2019-08-01 PROCEDURE — A9577 INJ MULTIHANCE: HCPCS | Performed by: RADIOLOGY

## 2019-08-01 RX ADMIN — GADOBENATE DIMEGLUMINE 14 ML: 529 INJECTION, SOLUTION INTRAVENOUS at 10:36

## 2019-08-07 ENCOUNTER — TELEPHONE (OUTPATIENT)
Dept: NEUROSURGERY | Facility: CLINIC | Age: 74
End: 2019-08-07

## 2019-08-07 ENCOUNTER — OFFICE VISIT (OUTPATIENT)
Dept: NEUROSURGERY | Facility: CLINIC | Age: 74
End: 2019-08-07

## 2019-08-07 VITALS
HEIGHT: 65 IN | SYSTOLIC BLOOD PRESSURE: 148 MMHG | BODY MASS INDEX: 25.83 KG/M2 | TEMPERATURE: 98.1 F | WEIGHT: 155 LBS | DIASTOLIC BLOOD PRESSURE: 72 MMHG

## 2019-08-07 DIAGNOSIS — I65.1 BASILAR ARTERY STENOSIS/OCCLUSION: Primary | ICD-10-CM

## 2019-08-07 DIAGNOSIS — I65.1 BASILAR ARTERY STENOSIS: ICD-10-CM

## 2019-08-07 PROCEDURE — 99213 OFFICE O/P EST LOW 20 MIN: CPT | Performed by: RADIOLOGY

## 2019-08-07 RX ORDER — CLOPIDOGREL BISULFATE 75 MG/1
75 TABLET ORAL DAILY
Qty: 90 TABLET | Refills: 12 | Status: SHIPPED | OUTPATIENT
Start: 2019-08-07 | End: 2020-08-19

## 2019-08-07 RX ORDER — ATORVASTATIN CALCIUM 40 MG/1
40 TABLET, FILM COATED ORAL NIGHTLY
Qty: 90 TABLET | Refills: 12
Start: 2019-08-07 | End: 2019-10-08 | Stop reason: SDUPTHER

## 2019-08-07 NOTE — PROGRESS NOTES
NAME: LIDA HARMON   DOS: 2019  : 1945  PCP: Milvia Frye MD    Chief Complaint:    Chief Complaint   Patient presents with   • Cerebrovascular Accident     s/p basilar stent       History of Present Illness:  74 y.o. female well known to the Neurointerventional service, having been treated on multiple occasions for advanced intracranial atherosclerotic disease (ICAD) and prior basilar occlusion X 2.  She's had angioplasty/stent placement involving her intracranial right vertebral artery greater than 5 years ago (Wingspan & Xience).  She did very well for several years, but presented to [facility] in mid-May 2018 for recurrent ICAD and basilar thrombosis.  She was transferred to [facility], and underwent emergent neurointervention with IA tPA/thrombectomy and angioplasty of critical stenosis of right VB junction on 18.  This represented progression of disease, as her prior stents were widely patent.  She was restarted on Plavix/ASA regimen, and made an excellent recovery.       She re-presented to [facility] emergency department on 2018 with a 2 day history of progressive weakness and slurred speech.  Given her sub--acute presentation, she was not a candidate for IV TPA therapy, but MR angiography demonstrated reocclusion of her basilar artery.  Given the uniformly poor prognosis, she was taken for emergent neuro intervention with successful restoration of flow within the basilar artery with intra-arterial TPA/thrombectomy, as well as placement of a 2.25 mm Xience drug-eluting stent at a critical re-stenosis involving the right vertebrobasilar junction (recurrent stenosis, site of prior angioplasty).     Unfortunately, Ms. Harmon did suffer new ischemic strokes (tiny/punctate infarcts involving her right posterolateral medulla, prashant, and left cerebellum) related to her most recent stroke in 2018, but has continued to show significant  improvement since I saw her last.  She has regained antigravity strength in the bilateral lower extremities, and is able to ambulate some with assistance of a walker.  Her speech is clear, and she's tolerating by mouth intake very well.  She remains on a lifelong dual antiplatelet regimen.  She has experienced no new stroke symptoms or deficits, and presents today for routine follow-up.      Past Medical History:  Past Medical History:   Diagnosis Date   • Atherosclerotic cerebrovascular disease    • Chronic kidney disease    • Hyperlipidemia    • Hypertension    • Stroke (CMS/HCC)     Multiple in September 2011 and Dec 2011       Past Surgical History:  Past Surgical History:   Procedure Laterality Date   • CEREBRAL ANGIOGRAM     • CEREBRAL ANGIOGRAM N/A 5/22/2018    Procedure: Cerebral angiogram;  Surgeon: Gilberto Sears MD;  Location:  [facility];  Service: Interventional Radiology   • CORONARY ARTERY BYPASS GRAFT     • INTERVENTIONAL RADIOLOGY PROCEDURE Bilateral 6/13/2018    Procedure: Carotid Cerebral Angiogram;  Surgeon: Gilberto Sears MD;  Location:  [facility];  Service: Interventional Radiology   • INTRACRANIAL ARTERY ANGIOPLASTY         Review of Systems:        Review of Systems   Musculoskeletal: Positive for gait problem.   All other systems reviewed and are negative.       Medications    Current Outpatient Medications:   •  amLODIPine (NORVASC) 10 MG tablet, Take 1 tablet by mouth Daily., Disp: 90 tablet, Rfl: 5  •  aspirin 81 MG chewable tablet, Chew 1 tablet Daily., Disp: 90 tablet, Rfl: 5  •  atorvastatin (LIPITOR) 40 MG tablet, Take 1 tablet by mouth Every Night., Disp: 90 tablet, Rfl: 12  •  clopidogrel (PLAVIX) 75 MG tablet, Take 1 tablet by mouth Daily., Disp: 90 tablet, Rfl: 12  •  dextrose (GLUTOSE) 40 % gel, Take 15 g by mouth Every 15 (Fifteen) Minutes As Needed for Low Blood Sugar (Blood sugar less than 70)., Disp: , Rfl:   •  hydrALAZINE (APRESOLINE)  50 MG tablet, Take 1 tablet by mouth 3 (Three) Times a Day., Disp: 270 tablet, Rfl: 5  •  metoprolol tartrate (LOPRESSOR) 50 MG tablet, Take 1 tablet by mouth Every 12 (Twelve) Hours., Disp: 180 tablet, Rfl: 5  •  ondansetron (ZOFRAN) 4 MG/2ML injection, Infuse 2 mL into a venous catheter Every 6 (Six) Hours As Needed for Nausea or Vomiting., Disp: , Rfl:     Allergies:  No Known Allergies    Social Hx:  Social History     Tobacco Use   • Smoking status: Never Smoker   • Smokeless tobacco: Never Used   Substance Use Topics   • Alcohol use: No   • Drug use: No       Family Hx:  No family history on file.    Review of Imaging:  MR angiogram of the head with and without contrast dated 8/1/2019 was reviewed along with its corresponding radiologic report.  Comparison is made to multiple prior MR angiograms as well as catheter angiogram's, the most recent being on 12/28/2018.  Given differences in technique, there is been no change in appearance of the intracranial vasculature.  There are areas of signal loss/artifact related 2 stents within the intracranial portions of the right vertebral artery and at the vertebrobasilar junction, but the right vertebral artery and basilar artery remained patent.  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  Atherosclerotic change involving the anterior circulation remains table.          Physical Examination:  Vitals:    08/07/19 1413   BP: 148/72   Temp: 98.1 °F (36.7 °C)        General Appearance:   Well developed, well nourished, well groomed, alert, and cooperative.  Cardiovascular: Regular rate and rhythm. No carotid bruits      Neurological examination:  Neurologic Exam     Mental Status   Oriented to person, place, and time.   Speech: speech is normal   Level of consciousness: alert    Cranial Nerves   Cranial nerves II through XII intact.     Motor Exam Symmetric 4/5 strength in the upper extremities, 2+5 strength in the lower extremities     Sensory Exam   Right  arm light touch: normal  Left arm light touch: normal  Right leg light touch: decreased from knee  Left leg light touch: decreased from knee    Gait, Coordination, and Reflexes She uses a wheelchair the majority of time, but can ambulate with assistance       Diagnoses/Plan:    Ms. Harmon is a 74 y.o. female well known to the neuro interventional service, having been treated on multiple occasions for advanced intracranial atherosclerotic disease, predominantly involving the vertebrobasilar junction.  She has presented on multiple occasions over the past 7 years or so with brainstem/posterior fossa strokes and basilar occlusions.  She most recently was admitted on 6/13/2018 with a 2 day history of progressive weakness and slurred speech.  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  Given the uniformly poor prognosis, she was taken for emergent neuro intervention with successful restoration of flow within the basilar artery with intra-arterial TPA/thrombectomy, as well as placement of a 2.25 mm Xience drug-eluting stent at a critical re-stenosis involving the right vertebrobasilar junction (recurrent stenosis, site of prior angioplasty).  Unfortunately, Ms. Harmon did suffer new ischemic strokes (tiny/punctate infarcts involving her right posterolateral medulla, prashant, and left cerebellum) related to her most recent event.      She has made significant improvements given her presenting deficits, but does require a wheelchair most of the time (can ambulate with assistance).  She will need to remain on lifelong dual antiplatelet regimen and statin therapy.  I plan on seeing her back in 6 months time for routine follow-up, and anticipate performing a follow-up MR angiogram of the head in 12 months time to ensure stability and exclude any progression/recurrence of disease that might necessitate further  treatment/intervention.

## 2019-10-08 DIAGNOSIS — I65.1 BASILAR ARTERY STENOSIS/OCCLUSION: ICD-10-CM

## 2019-10-08 RX ORDER — ATORVASTATIN CALCIUM 40 MG/1
40 TABLET, FILM COATED ORAL NIGHTLY
Qty: 90 TABLET | Refills: 12
Start: 2019-10-08 | End: 2020-01-01

## 2019-10-08 NOTE — TELEPHONE ENCOUNTER
Provider:  Renu   Caller:  Automated refill request  Surgery:  Angio   Surgery Date:  6/13/18  Last visit:  8/7/2019  Next visit: 2/10/2020    Reason for call:         Fax RF request.     Requested Prescriptions     Pending Prescriptions Disp Refills   • atorvastatin (LIPITOR) 40 MG tablet 90 tablet 12     Sig: Take 1 tablet by mouth Every Night.

## 2019-10-11 ENCOUNTER — DOCUMENTATION (OUTPATIENT)
Dept: NEUROSURGERY | Facility: CLINIC | Age: 74
End: 2019-10-11

## 2019-10-11 NOTE — PROGRESS NOTES
Pt LVM saying atorvastatin was not ready at her pharmacy.  Rx phoned in to pharmacy on file.  Pt aware.

## 2019-12-06 DIAGNOSIS — I65.1 BASILAR ARTERY STENOSIS/OCCLUSION: ICD-10-CM

## 2019-12-09 RX ORDER — HYDRALAZINE HYDROCHLORIDE 50 MG/1
TABLET, FILM COATED ORAL
Qty: 270 TABLET | Refills: 0 | OUTPATIENT
Start: 2019-12-09

## 2019-12-15 DIAGNOSIS — I65.1 BASILAR ARTERY STENOSIS/OCCLUSION: ICD-10-CM

## 2019-12-16 RX ORDER — HYDRALAZINE HYDROCHLORIDE 50 MG/1
TABLET, FILM COATED ORAL
Qty: 270 TABLET | Refills: 0 | Status: SHIPPED | OUTPATIENT
Start: 2019-12-16 | End: 2020-05-04

## 2019-12-16 NOTE — TELEPHONE ENCOUNTER
Provider:  Renu  Caller:  Automated refill request  Past Surgical History:   Procedure Laterality Date   • CEREBRAL ANGIOGRAM     • CEREBRAL ANGIOGRAM N/A 5/22/2018    Procedure: Cerebral angiogram;  Surgeon: Gilberto Sears MD;  Location:  [facility];  Service: Interventional Radiology   • CORONARY ARTERY BYPASS GRAFT     • INTERVENTIONAL RADIOLOGY PROCEDURE Bilateral 6/13/2018    Procedure: Carotid Cerebral Angiogram;  Surgeon: Gilberto Sears MD;  Location:  [facility];  Service: Interventional Radiology   • INTRACRANIAL ARTERY ANGIOPLASTY       Last visit: 08/07/19   Next visit: 02/10/20    Reason for call:         Requested Prescriptions     Pending Prescriptions Disp Refills   • hydrALAZINE (APRESOLINE) 50 MG tablet [Pharmacy Med Name: HYDRALAZINE  50MG TABLETS(ORANGE)] 270 tablet 0     Sig: TAKE 1 TABLET BY MOUTH THREE TIMES DAILY

## 2020-01-01 DIAGNOSIS — I65.1 BASILAR ARTERY STENOSIS/OCCLUSION: ICD-10-CM

## 2020-01-01 RX ORDER — ATORVASTATIN CALCIUM 40 MG/1
TABLET, FILM COATED ORAL
Qty: 90 TABLET | Refills: 12 | Status: SHIPPED | OUTPATIENT
Start: 2020-01-01

## 2020-05-03 DIAGNOSIS — I65.1 BASILAR ARTERY STENOSIS/OCCLUSION: ICD-10-CM

## 2020-05-04 RX ORDER — HYDRALAZINE HYDROCHLORIDE 50 MG/1
TABLET, FILM COATED ORAL
Qty: 270 TABLET | Refills: 0 | Status: SHIPPED | OUTPATIENT
Start: 2020-05-04

## 2020-05-04 NOTE — TELEPHONE ENCOUNTER
Requested Prescriptions     Pending Prescriptions Disp Refills   • hydrALAZINE (APRESOLINE) 50 MG tablet [Pharmacy Med Name: HYDRALAZINE  50MG TABLETS(ORANGE)] 270 tablet 0     Sig: TAKE 1 TABLET BY MOUTH THREE TIMES DAILY

## 2020-05-06 ENCOUNTER — OFFICE VISIT (OUTPATIENT)
Dept: NEUROSURGERY | Facility: CLINIC | Age: 75
End: 2020-05-06

## 2020-05-06 VITALS — WEIGHT: 154.98 LBS | HEIGHT: 65 IN | BODY MASS INDEX: 25.82 KG/M2

## 2020-05-06 DIAGNOSIS — I67.2 ATHEROSCLEROTIC CEREBROVASCULAR DISEASE: ICD-10-CM

## 2020-05-06 DIAGNOSIS — I65.09 VERTEBROBASILAR ARTERY STENOSIS: ICD-10-CM

## 2020-05-06 DIAGNOSIS — I63.211 CEREBROVASCULAR ACCIDENT (CVA) DUE TO STENOSIS OF RIGHT VERTEBRAL ARTERY (HCC): ICD-10-CM

## 2020-05-06 DIAGNOSIS — I63.02 CEREBROVASCULAR ACCIDENT (CVA) DUE TO THROMBOSIS OF BASILAR ARTERY (HCC): Primary | ICD-10-CM

## 2020-05-06 DIAGNOSIS — I65.1 VERTEBROBASILAR ARTERY STENOSIS: ICD-10-CM

## 2020-05-06 PROCEDURE — 99442 PR PHYS/QHP TELEPHONE EVALUATION 11-20 MIN: CPT | Performed by: PHYSICIAN ASSISTANT

## 2020-05-06 NOTE — PROGRESS NOTES
NAME: LIDA HARMON   DOS: 2020  : 1945  PCP: Milvia Frye MD  Type of Service: Appointment via telemedicine  You have chosen to receive care through a telehealth visit.  Do you consent to use a audio connection for your medical care today? Yes   Mode of Transmission: Telemedicine via 2 way interactive audio telecommunication    Chief Complaint:      History of Present Illness: Ms. Harmon is a 74 y.o. female well known to the neurointerventional service, having been treated on multiple occasions for advanced intracranial atherosclerotic disease (ICAD) and prior basilar occlusion X 2.  She's had angioplasty/stent placement involving her intracranial right vertebral artery greater than 5 years ago (Wingspan & Xience).  She did very well for several years, but presented to [facility] in May 2018 for recurrent ICAD and basilar thrombosis.  She was transferred to [facility] and underwent emergent neurointervention with IA tPA/thrombectomy and angioplasty of critical stenosis of right VB junction.  This represented progression of disease, as her prior stents were widely patent.  She was restarted on Plavix/ASA regimen and made an excellent recovery.       She re-presented to [facility] emergency department in 2018 with a 2 day history of progressive weakness and slurred speech.  Given her sub--acute presentation, she was not a candidate for IV TPA therapy, but MR angiography demonstrated reocclusion of her basilar artery.  Given the uniformly poor prognosis, she was taken for emergent neuro intervention with successful restoration of flow within the basilar artery with intra-arterial TPA/thrombectomy, as well as placement of a 2.25 mm Xience drug-eluting stent at a critical re-stenosis involving the right vertebrobasilar junction (recurrent stenosis, site of prior angioplasty).  She suffered new ischemic strokes (tiny/punctate infarcts involving her right  posterolateral medulla, prashant, and left cerebellum) related to stroke in June 2018.    Today, patient states that she has remained about the same. She notes antigravity strength in the bilateral lower extremities but does that it is difficult. She remains mainly using a wheelchair, is sometimes able to stand with assistance. She states that she is not really waking at this time  Her speech is clear, and she's tolerating by mouth intake very well.  She remains on a lifelong dual antiplatelet regimen.  She has experienced no new stroke symptoms or deficits and presents today for routine follow-up.      Past Medical History:   Diagnosis Date   • Atherosclerotic cerebrovascular disease    • Chronic kidney disease    • Hyperlipidemia    • Hypertension    • Stroke (CMS/HCC)     Multiple in September 2011 and Dec 2011       Past Surgical History:   Procedure Laterality Date   • CEREBRAL ANGIOGRAM     • CEREBRAL ANGIOGRAM N/A 5/22/2018    Procedure: Cerebral angiogram;  Surgeon: Gilberto Sears MD;  Location:  [facility];  Service: Interventional Radiology   • CORONARY ARTERY BYPASS GRAFT     • INTERVENTIONAL RADIOLOGY PROCEDURE Bilateral 6/13/2018    Procedure: Carotid Cerebral Angiogram;  Surgeon: Gilberto Sears MD;  Location:  [facility];  Service: Interventional Radiology   • INTRACRANIAL ARTERY ANGIOPLASTY               Review of Systems   Gastrointestinal: Positive for abdominal pain.   Neurological: Positive for facial asymmetry and weakness. Negative for numbness.   All other systems reviewed and are negative.       Medications:    Current Outpatient Medications:   •  amLODIPine (NORVASC) 10 MG tablet, Take 1 tablet by mouth Daily., Disp: 90 tablet, Rfl: 5  •  aspirin 81 MG chewable tablet, Chew 1 tablet Daily., Disp: 90 tablet, Rfl: 5  •  atorvastatin (LIPITOR) 40 MG tablet, Take 1 tablet by mouth Every Night., Disp: 90 tablet, Rfl: 12  •  clopidogrel (PLAVIX) 75 MG tablet, Take  1 tablet by mouth Daily., Disp: 90 tablet, Rfl: 12  •  dextrose (GLUTOSE) 40 % gel, Take 15 g by mouth Every 15 (Fifteen) Minutes As Needed for Low Blood Sugar (Blood sugar less than 70)., Disp: , Rfl:   •  hydrALAZINE (APRESOLINE) 50 MG tablet, TAKE 1 TABLET BY MOUTH THREE TIMES DAILY, Disp: 270 tablet, Rfl: 0  •  metoprolol tartrate (LOPRESSOR) 50 MG tablet, Take 1 tablet by mouth Every 12 (Twelve) Hours., Disp: 180 tablet, Rfl: 5  •  ondansetron (ZOFRAN) 4 MG/2ML injection, Infuse 2 mL into a venous catheter Every 6 (Six) Hours As Needed for Nausea or Vomiting., Disp: , Rfl:     Allergies:  No Known Allergies    Social History     Tobacco Use   • Smoking status: Never Smoker   • Smokeless tobacco: Never Used   Substance Use Topics   • Alcohol use: No   • Drug use: No       No family history on file.    Review of Imaging:  No new imaging to review       There were no vitals filed for this visit.  Body mass index is 25.79 kg/m².    Physical Exam   Constitutional: She is oriented to person, place, and time.   Pulmonary/Chest: Effort normal.   Neurological: She is alert and oriented to person, place, and time.     Neurologic Exam     Mental Status   Oriented to person, place, and time.   Physical exam is limited due to telephone encounter per recommendations due to COVID 19.  Patient notes that she is continuing to have right-sided weakness (leg greater than arm).  She states she is able to lift both legs off the ground.  She is able to stand for short periods of time.  Her speech is clear.      Diagnoses/Plan:    Ms. Harmon is a 74 y.o. female well known to the neuro interventional service, having been treated on multiple occasions for advanced intracranial atherosclerotic disease, predominantly involving the vertebrobasilar junction.  She has presented on multiple occasions over the past 8 years or so with brainstem/posterior fossa strokes and basilar occlusions.  She most recently was admitted in June 2018 with a  2 day history of progressive weakness and slurred speech.  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  Given the uniformly poor prognosis, she was taken for emergent neuro intervention with successful restoration of flow within the basilar artery with intra-arterial TPA/thrombectomy, as well as placement of a 2.25 mm Xience drug-eluting stent at a critical re-stenosis involving the right vertebrobasilar junction (recurrent stenosis, site of prior angioplasty).  She did suffer new ischemic strokes (tiny/punctate infarcts involving her right posterolateral medulla, prashant, and left cerebellum) due to the last known stroke.     She continues to require a wheelchair most of the time.  She states that she has not any new stroke or TIA like symptoms. She will remain on lifelong dual antiplatelet regimen and statin therapy.  She will follow-up in 3 months, for her 12 month follow-up MR angiogram  to ensure stability and exclude any progression/recurrence of disease that might necessitate further treatment/intervention.    She was instructed to report to the emergency room if she does experience any stroke or TIA-like symptoms.  Patient was understanding this plan and willing to proceed.        Patient's Body mass index is 25.79 kg/m². BMI is above normal parameters. Recommendations include: Continue monitoring nutrition.            Puja Delgado PA-C    Televisit Start Time: 1240  Televisit End Time: 1257

## 2020-07-31 ENCOUNTER — HOSPITAL ENCOUNTER (OUTPATIENT)
Dept: MRI IMAGING | Facility: HOSPITAL | Age: 75
Discharge: HOME | End: 2020-07-31
Admitting: RADIOLOGY

## 2020-07-31 DIAGNOSIS — I65.1 BASILAR ARTERY STENOSIS/OCCLUSION: ICD-10-CM

## 2020-07-31 LAB — CREAT BLDA-MCNC: 1.1 MG/DL (ref 0.6–1.3)

## 2020-07-31 PROCEDURE — 25010000002 GADOTERATE MEGLUMINE 7.5 MMOL/15ML SOLUTION: Performed by: RADIOLOGY

## 2020-07-31 PROCEDURE — 70546 MR ANGIOGRAPH HEAD W/O&W/DYE: CPT

## 2020-07-31 PROCEDURE — A9575 INJ GADOTERATE MEGLUMI 0.1ML: HCPCS | Performed by: RADIOLOGY

## 2020-07-31 PROCEDURE — 82565 ASSAY OF CREATININE: CPT

## 2020-07-31 RX ORDER — GADOTERATE MEGLUMINE 376.9 MG/ML
14 INJECTION INTRAVENOUS
Status: COMPLETED | OUTPATIENT
Start: 2020-07-31 | End: 2020-07-31

## 2020-07-31 RX ADMIN — GADOTERATE MEGLUMINE 14 ML: 376.9 INJECTION, SOLUTION INTRAVENOUS at 09:55

## 2020-08-03 ENCOUNTER — APPOINTMENT (OUTPATIENT)
Dept: MRI IMAGING | Facility: HOSPITAL | Age: 75
End: 2020-08-03

## 2020-08-13 DIAGNOSIS — I65.1 BASILAR ARTERY STENOSIS/OCCLUSION: ICD-10-CM

## 2020-08-13 RX ORDER — HYDRALAZINE HYDROCHLORIDE 50 MG/1
50 TABLET, FILM COATED ORAL 3 TIMES DAILY
Qty: 90 TABLET | Refills: 0 | Status: SHIPPED | OUTPATIENT
Start: 2020-08-13

## 2020-08-13 NOTE — TELEPHONE ENCOUNTER
I have changed the prescription for this patient to 1 months dosage.  I want patient to follow-up with her PCP regarding this medication.  Thanks

## 2020-08-13 NOTE — TELEPHONE ENCOUNTER
Provider:  Given  Caller: Fax    Last visit:   5/6/2020  Next visit: 8/26/2020         Reason for call:       Requested Prescriptions     Pending Prescriptions Disp Refills   • hydrALAZINE (APRESOLINE) 50 MG tablet 270 tablet 0     Sig: Take 1 tablet by mouth 3 (Three) Times a Day.

## 2020-08-19 DIAGNOSIS — I65.1 BASILAR ARTERY STENOSIS: ICD-10-CM

## 2020-08-19 RX ORDER — CLOPIDOGREL BISULFATE 75 MG/1
75 TABLET ORAL DAILY
Qty: 90 TABLET | Refills: 12 | Status: SHIPPED | OUTPATIENT
Start: 2020-08-19

## 2020-08-19 NOTE — TELEPHONE ENCOUNTER
Provider:  Renu  Caller:  Automated refill request  Surgery: mechanical thrombectomy  Surgery Date:  06/13/18  Last visit:  05/06/20  Next visit: 08/26/20    Reason for call:         Requested Prescriptions     Pending Prescriptions Disp Refills   • clopidogrel (PLAVIX) 75 MG tablet [Pharmacy Med Name: CLOPIDOGREL 75MG TABLETS] 90 tablet 12     Sig: TAKE 1 TABLET BY MOUTH DAILY

## 2020-08-24 ENCOUNTER — TELEPHONE (OUTPATIENT)
Dept: NEUROSURGERY | Facility: CLINIC | Age: 75
End: 2020-08-24

## 2020-08-24 NOTE — TELEPHONE ENCOUNTER
Patient is scheduled for a yearly follow up with MRI on Wed. 8/26/20 with Dr. Sears. She has requested this be changed to a telephone visit.     Okay to change appointment type?

## 2020-08-26 ENCOUNTER — OFFICE VISIT (OUTPATIENT)
Dept: NEUROSURGERY | Facility: CLINIC | Age: 75
End: 2020-08-26

## 2020-08-26 VITALS — HEIGHT: 65 IN | WEIGHT: 154.98 LBS | BODY MASS INDEX: 25.82 KG/M2

## 2020-08-26 DIAGNOSIS — I65.1 BASILAR ARTERY STENOSIS/OCCLUSION: Primary | ICD-10-CM

## 2020-08-26 DIAGNOSIS — I63.02 CEREBROVASCULAR ACCIDENT (CVA) DUE TO THROMBOSIS OF BASILAR ARTERY (HCC): ICD-10-CM

## 2020-08-26 DIAGNOSIS — I65.1 VERTEBROBASILAR ARTERY STENOSIS: ICD-10-CM

## 2020-08-26 DIAGNOSIS — I65.09 VERTEBROBASILAR ARTERY STENOSIS: ICD-10-CM

## 2020-08-26 PROCEDURE — 99442 PR PHYS/QHP TELEPHONE EVALUATION 11-20 MIN: CPT | Performed by: PHYSICIAN ASSISTANT

## 2020-08-26 NOTE — PROGRESS NOTES
NAME: LIDA HARMON   DOS: 2020  : 1945  PCP: Milvia Frye MD  Type of Service: Appointment via telemedicine  You have chosen to receive care through a telehealth visit.  Do you consent to use a audio connection for your medical care today? Yes   Mode of Transmission: Telemedicine via 2 way interactive audio telecommunication    Chief Complaint:   Known intracranial atherosclerotic disease, s/p vertebrobasilar stent    History of Present Illness: Ms. Harmon is a 75 y.o. female who is well-known to the neuro intervential service, having been treated on multiple occasions for advanced intracranial atherosclerotic disease (iCAD) and prior basilar occlusion x2.  Patient has underwent an angioplasty/stent placement involving her intracranial right vertebral artery greater than 5 years ago (wingspan and Xience).  She had done very well for several years but presented to [facility] in May 2018 due to recurrent iCAD and basilar thrombosis.  At that time, she was transferred to [facility] and underwent emergent neuro intervention with IA TPA and angioplasty of critical stenosis of right VB junction.   In 2018, patient really presented to [facility] with progressive weakness and slurred speech.  She was not a candidate for IV TPA therapy, however, MR a demonstrated reocclusion of her basilar artery.  Therefore, she was taken for emergent neuro intervention with successful restoration of flow within the basilar artery with IA TPA/thrombectomy and science drug-eluting stent of the critical restenosis of the right vertebrobasilar junction.  At that time, she suffered tiny/punctate infarcts involving the right posterior lateral medulla, prashant, and left cerebellum.    Today, patient states that she remains the same.  Notes that she mainly uses a wheelchair, however, is able to stand for a short period of time.  Denies any TIA or strokelike symptoms, specifically  unilateral weakness/numbness or changes in speech or vision.  She has remained on Plavix and aspirin without difficulty.  She is seen today for follow-up.      Past Medical History:   Diagnosis Date   • Atherosclerotic cerebrovascular disease    • Chronic kidney disease    • Hyperlipidemia    • Hypertension    • Stroke (CMS/HCC)     Multiple in September 2011 and Dec 2011       Past Surgical History:   Procedure Laterality Date   • CEREBRAL ANGIOGRAM     • CEREBRAL ANGIOGRAM N/A 5/22/2018    Procedure: Cerebral angiogram;  Surgeon: Gilberto Sears MD;  Location:  [facility];  Service: Interventional Radiology   • CORONARY ARTERY BYPASS GRAFT     • INTERVENTIONAL RADIOLOGY PROCEDURE Bilateral 6/13/2018    Procedure: Carotid Cerebral Angiogram;  Surgeon: Gilberto Sears MD;  Location:  [facility];  Service: Interventional Radiology   • INTRACRANIAL ARTERY ANGIOPLASTY               Review of Systems   Genitourinary: Negative.    Neurological: Positive for weakness.   All other systems reviewed and are negative.       Medications:    Current Outpatient Medications:   •  amLODIPine (NORVASC) 10 MG tablet, Take 1 tablet by mouth Daily., Disp: 90 tablet, Rfl: 5  •  aspirin 81 MG chewable tablet, Chew 1 tablet Daily., Disp: 90 tablet, Rfl: 5  •  atorvastatin (LIPITOR) 40 MG tablet, Take 1 tablet by mouth Every Night., Disp: 90 tablet, Rfl: 12  •  clopidogrel (PLAVIX) 75 MG tablet, TAKE 1 TABLET BY MOUTH DAILY, Disp: 90 tablet, Rfl: 12  •  dextrose (GLUTOSE) 40 % gel, Take 15 g by mouth Every 15 (Fifteen) Minutes As Needed for Low Blood Sugar (Blood sugar less than 70)., Disp: , Rfl:   •  hydrALAZINE (APRESOLINE) 50 MG tablet, TAKE 1 TABLET BY MOUTH THREE TIMES DAILY, Disp: 270 tablet, Rfl: 0  •  hydrALAZINE (APRESOLINE) 50 MG tablet, Take 1 tablet by mouth 3 (Three) Times a Day., Disp: 90 tablet, Rfl: 0  •  metoprolol tartrate (LOPRESSOR) 50 MG tablet, Take 1 tablet by mouth Every 12  (Twelve) Hours., Disp: 180 tablet, Rfl: 5  •  ondansetron (ZOFRAN) 4 MG/2ML injection, Infuse 2 mL into a venous catheter Every 6 (Six) Hours As Needed for Nausea or Vomiting., Disp: , Rfl:     Allergies:  No Known Allergies    Social History     Tobacco Use   • Smoking status: Never Smoker   • Smokeless tobacco: Never Used   Substance Use Topics   • Alcohol use: No   • Drug use: No       History reviewed. No pertinent family history.    Review of Imaging:  MRA of the head that was performed on 7/31/2020 was reviewed with Dr. jimenez along with its corresponding radiology report.  Study demonstrates stable appearance when compared to her prior study that was performed on 8/1/2019.  Study does demonstrate advanced atherosclerotic involvement and the basilar stents appear patent.    There were no vitals filed for this visit.  Body mass index is 25.79 kg/m².    Physical Exam   Constitutional: She is oriented to person, place, and time.   Pulmonary/Chest: Effort normal. No respiratory distress.   Neurological: She is oriented to person, place, and time.     Neurologic Exam     Mental Status   Oriented to person, place, and time.      Appointment was performed via telephone.  Patient does present some dysarthria at times, but able to tolerate by mouth well..  She also notes she continues to have right-sided weakness.  She states that she is mainly in a wheelchair, but is able to stand with assistance.  States that she is able to display antigravity strength of her lower extremity.    Diagnoses/Plan:    Ms. Harmon is a 75 y.o. female who is well-known to the neuro interventional service, having been treated on multiple occasions for advanced intracranial atherosclerotic disease, predominantly involving the vertebrobasilar junction.  Over the past 8 years, patient has had multiple brainstem/posterior fascia strokes and basilar occlusions.  After reviewing the studies, patient's MRA remains stable with her basilar stents  patent.  Therefore, we will continue patient on lifelong dual antiplatelet therapy.  We will have her follow-up in 12 months time with an MRA to ensure stability and no further progression of disease that may necessitate intervention.  She was instructed to report to the emergency room or call our office if she experiences any stroke or TIA-like symptoms.  Patient was understand this plan and willing to proceed.      Patient's Body mass index is 25.79 kg/m². BMI is above normal parameters. Recommendations include: educational material.      Puja Delgado PA-C    Televisit Start Time: 1530  Televisit End Time: 1545

## 2021-01-01 ENCOUNTER — APPOINTMENT (OUTPATIENT)
Dept: GENERAL RADIOLOGY | Facility: HOSPITAL | Age: 76
End: 2021-01-01

## 2021-01-01 ENCOUNTER — APPOINTMENT (OUTPATIENT)
Dept: CARDIOLOGY | Facility: HOSPITAL | Age: 76
End: 2021-01-01

## 2021-01-01 ENCOUNTER — APPOINTMENT (OUTPATIENT)
Dept: CT IMAGING | Facility: HOSPITAL | Age: 76
End: 2021-01-01

## 2021-01-01 ENCOUNTER — APPOINTMENT (OUTPATIENT)
Dept: MRI IMAGING | Facility: HOSPITAL | Age: 76
End: 2021-01-01

## 2021-01-01 ENCOUNTER — ANESTHESIA EVENT (OUTPATIENT)
Dept: PERIOP | Facility: HOSPITAL | Age: 76
End: 2021-01-01

## 2021-01-01 ENCOUNTER — HOSPITAL ENCOUNTER (INPATIENT)
Facility: HOSPITAL | Age: 76
LOS: 1 days | End: 2021-09-27
Attending: INTERNAL MEDICINE | Admitting: FAMILY MEDICINE

## 2021-01-01 ENCOUNTER — HOSPITAL ENCOUNTER (INPATIENT)
Facility: HOSPITAL | Age: 76
LOS: 4 days | Discharge: HOSPICE | End: 2021-09-27
Attending: EMERGENCY MEDICINE | Admitting: FAMILY MEDICINE

## 2021-01-01 ENCOUNTER — ANESTHESIA (OUTPATIENT)
Dept: PERIOP | Facility: HOSPITAL | Age: 76
End: 2021-01-01

## 2021-01-01 VITALS
OXYGEN SATURATION: 85 % | SYSTOLIC BLOOD PRESSURE: 131 MMHG | HEIGHT: 70 IN | DIASTOLIC BLOOD PRESSURE: 75 MMHG | WEIGHT: 183.1 LBS | RESPIRATION RATE: 20 BRPM | TEMPERATURE: 99.6 F | HEART RATE: 126 BPM | BODY MASS INDEX: 26.21 KG/M2

## 2021-01-01 DIAGNOSIS — R53.1 WEAKNESS: Primary | ICD-10-CM

## 2021-01-01 DIAGNOSIS — K63.1 BOWEL PERFORATION (HCC): ICD-10-CM

## 2021-01-01 DIAGNOSIS — R93.89 ABNORMAL CT SCAN: ICD-10-CM

## 2021-01-01 DIAGNOSIS — R10.32 LEFT LOWER QUADRANT ABDOMINAL PAIN: ICD-10-CM

## 2021-01-01 DIAGNOSIS — I10 UNCONTROLLED HYPERTENSION: ICD-10-CM

## 2021-01-01 DIAGNOSIS — N28.9 RENAL INSUFFICIENCY: ICD-10-CM

## 2021-01-01 DIAGNOSIS — A41.9 SEPSIS (HCC): ICD-10-CM

## 2021-01-01 LAB
ABO GROUP BLD: NORMAL
ALBUMIN SERPL-MCNC: 2.4 G/DL (ref 3.5–5.2)
ALBUMIN SERPL-MCNC: 2.8 G/DL (ref 3.5–5.2)
ALBUMIN SERPL-MCNC: 3.4 G/DL (ref 3.5–5.2)
ALBUMIN SERPL-MCNC: 3.4 G/DL (ref 3.5–5.2)
ALBUMIN SERPL-MCNC: 4.1 G/DL (ref 3.5–5.2)
ALBUMIN/GLOB SERPL: 1 G/DL
ALBUMIN/GLOB SERPL: 1 G/DL
ALBUMIN/GLOB SERPL: 1.1 G/DL
ALBUMIN/GLOB SERPL: 1.1 G/DL
ALP SERPL-CCNC: 106 U/L (ref 39–117)
ALP SERPL-CCNC: 128 U/L (ref 39–117)
ALP SERPL-CCNC: 53 U/L (ref 39–117)
ALP SERPL-CCNC: 64 U/L (ref 39–117)
ALP SERPL-CCNC: 69 U/L (ref 39–117)
ALT SERPL W P-5'-P-CCNC: 5 U/L (ref 1–33)
ALT SERPL W P-5'-P-CCNC: 6 U/L (ref 1–33)
ALT SERPL W P-5'-P-CCNC: 7 U/L (ref 1–33)
ALT SERPL W P-5'-P-CCNC: 8 U/L (ref 1–33)
ALT SERPL W P-5'-P-CCNC: 9 U/L (ref 1–33)
ANION GAP SERPL CALCULATED.3IONS-SCNC: 11 MMOL/L (ref 5–15)
ANION GAP SERPL CALCULATED.3IONS-SCNC: 12 MMOL/L (ref 5–15)
ANION GAP SERPL CALCULATED.3IONS-SCNC: 15 MMOL/L (ref 5–15)
ANION GAP SERPL CALCULATED.3IONS-SCNC: 16 MMOL/L (ref 5–15)
ANION GAP SERPL CALCULATED.3IONS-SCNC: 9 MMOL/L (ref 5–15)
ANION GAP SERPL CALCULATED.3IONS-SCNC: 9 MMOL/L (ref 5–15)
ARTERIAL PATENCY WRIST A: ABNORMAL
ARTERIAL PATENCY WRIST A: ABNORMAL
ASCENDING AORTA: 3.5 CM
AST SERPL-CCNC: 14 U/L (ref 1–32)
AST SERPL-CCNC: 15 U/L (ref 1–32)
AST SERPL-CCNC: 16 U/L (ref 1–32)
AST SERPL-CCNC: 17 U/L (ref 1–32)
AST SERPL-CCNC: 20 U/L (ref 1–32)
ATMOSPHERIC PRESS: ABNORMAL MM[HG]
ATMOSPHERIC PRESS: ABNORMAL MM[HG]
BASE EXCESS BLDA CALC-SCNC: -0.2 MMOL/L (ref 0–2)
BASE EXCESS BLDA CALC-SCNC: 0.9 MMOL/L (ref 0–2)
BASOPHILS # BLD AUTO: 0.02 10*3/MM3 (ref 0–0.2)
BASOPHILS # BLD AUTO: 0.03 10*3/MM3 (ref 0–0.2)
BASOPHILS # BLD AUTO: 0.05 10*3/MM3 (ref 0–0.2)
BASOPHILS # BLD AUTO: 0.05 10*3/MM3 (ref 0–0.2)
BASOPHILS # BLD AUTO: 0.06 10*3/MM3 (ref 0–0.2)
BASOPHILS # BLD MANUAL: 0 10*3/MM3 (ref 0–0.2)
BASOPHILS NFR BLD AUTO: 0 % (ref 0–1.5)
BASOPHILS NFR BLD AUTO: 0.2 % (ref 0–1.5)
BASOPHILS NFR BLD AUTO: 0.3 % (ref 0–1.5)
BASOPHILS NFR BLD AUTO: 0.4 % (ref 0–1.5)
BASOPHILS NFR BLD AUTO: 0.5 % (ref 0–1.5)
BASOPHILS NFR BLD AUTO: 0.9 % (ref 0–1.5)
BDY SITE: ABNORMAL
BDY SITE: ABNORMAL
BH BB BLOOD EXPIRATION DATE: NORMAL
BH BB BLOOD TYPE BARCODE: 6200
BH BB DISPENSE STATUS: NORMAL
BH BB PRODUCT CODE: NORMAL
BH BB UNIT NUMBER: NORMAL
BH CV ECHO MEAS - AO MAX PG (FULL): 3.8 MMHG
BH CV ECHO MEAS - AO MAX PG: 8 MMHG
BH CV ECHO MEAS - AO MEAN PG (FULL): 2 MMHG
BH CV ECHO MEAS - AO MEAN PG: 4 MMHG
BH CV ECHO MEAS - AO ROOT AREA (BSA CORRECTED): 1.5
BH CV ECHO MEAS - AO ROOT AREA: 6.6 CM^2
BH CV ECHO MEAS - AO ROOT DIAM: 2.9 CM
BH CV ECHO MEAS - AO V2 MAX: 140 CM/SEC
BH CV ECHO MEAS - AO V2 MEAN: 94.8 CM/SEC
BH CV ECHO MEAS - AO V2 VTI: 28 CM
BH CV ECHO MEAS - ASC AORTA: 3.5 CM
BH CV ECHO MEAS - AVA(I,A): 1.9 CM^2
BH CV ECHO MEAS - AVA(I,D): 1.9 CM^2
BH CV ECHO MEAS - AVA(V,A): 2.1 CM^2
BH CV ECHO MEAS - AVA(V,D): 2.1 CM^2
BH CV ECHO MEAS - BSA(HAYCOCK): 1.9 M^2
BH CV ECHO MEAS - BSA: 1.9 M^2
BH CV ECHO MEAS - BZI_BMI: 23 KILOGRAMS/M^2
BH CV ECHO MEAS - BZI_METRIC_HEIGHT: 177.8 CM
BH CV ECHO MEAS - BZI_METRIC_WEIGHT: 72.6 KG
BH CV ECHO MEAS - EDV(CUBED): 74.1 ML
BH CV ECHO MEAS - EDV(MOD-SP2): 71.4 ML
BH CV ECHO MEAS - EDV(MOD-SP4): 95.5 ML
BH CV ECHO MEAS - EDV(TEICH): 78.6 ML
BH CV ECHO MEAS - EF(CUBED): 67.1 %
BH CV ECHO MEAS - EF(MOD-BP): 58 %
BH CV ECHO MEAS - EF(MOD-SP2): 63.4 %
BH CV ECHO MEAS - EF(MOD-SP4): 58.7 %
BH CV ECHO MEAS - EF(TEICH): 59 %
BH CV ECHO MEAS - ESV(CUBED): 24.4 ML
BH CV ECHO MEAS - ESV(MOD-SP2): 26.1 ML
BH CV ECHO MEAS - ESV(MOD-SP4): 39.4 ML
BH CV ECHO MEAS - ESV(TEICH): 32.2 ML
BH CV ECHO MEAS - FS: 31 %
BH CV ECHO MEAS - IVS/LVPW: 1
BH CV ECHO MEAS - IVSD: 1 CM
BH CV ECHO MEAS - LA DIMENSION: 3.8 CM
BH CV ECHO MEAS - LA/AO: 1.3
BH CV ECHO MEAS - LAD MAJOR: 5 CM
BH CV ECHO MEAS - LAT PEAK E' VEL: 4.4 CM/SEC
BH CV ECHO MEAS - LATERAL E/E' RATIO: 9.1
BH CV ECHO MEAS - LV DIASTOLIC VOL/BSA (35-75): 50.3 ML/M^2
BH CV ECHO MEAS - LV IVRT: 0.1 SEC
BH CV ECHO MEAS - LV MASS(C)D: 137.2 GRAMS
BH CV ECHO MEAS - LV MASS(C)DI: 72.3 GRAMS/M^2
BH CV ECHO MEAS - LV MAX PG: 4.2 MMHG
BH CV ECHO MEAS - LV MEAN PG: 2 MMHG
BH CV ECHO MEAS - LV SYSTOLIC VOL/BSA (12-30): 20.8 ML/M^2
BH CV ECHO MEAS - LV V1 MAX: 103 CM/SEC
BH CV ECHO MEAS - LV V1 MEAN: 63.8 CM/SEC
BH CV ECHO MEAS - LV V1 VTI: 18.3 CM
BH CV ECHO MEAS - LVIDD: 4.2 CM
BH CV ECHO MEAS - LVIDS: 2.9 CM
BH CV ECHO MEAS - LVLD AP2: 6.8 CM
BH CV ECHO MEAS - LVLD AP4: 7 CM
BH CV ECHO MEAS - LVLS AP2: 5.1 CM
BH CV ECHO MEAS - LVLS AP4: 6 CM
BH CV ECHO MEAS - LVOT AREA (M): 2.8 CM^2
BH CV ECHO MEAS - LVOT AREA: 2.8 CM^2
BH CV ECHO MEAS - LVOT DIAM: 1.9 CM
BH CV ECHO MEAS - LVPWD: 1 CM
BH CV ECHO MEAS - MED PEAK E' VEL: 4.4 CM/SEC
BH CV ECHO MEAS - MEDIAL E/E' RATIO: 9.1
BH CV ECHO MEAS - MV A MAX VEL: 99.4 CM/SEC
BH CV ECHO MEAS - MV DEC SLOPE: 204 CM/SEC^2
BH CV ECHO MEAS - MV DEC TIME: 0.24 SEC
BH CV ECHO MEAS - MV E MAX VEL: 39.8 CM/SEC
BH CV ECHO MEAS - MV E/A: 0.4
BH CV ECHO MEAS - MV MAX PG: 4.7 MMHG
BH CV ECHO MEAS - MV MEAN PG: 1 MMHG
BH CV ECHO MEAS - MV P1/2T MAX VEL: 47.2 CM/SEC
BH CV ECHO MEAS - MV P1/2T: 67.8 MSEC
BH CV ECHO MEAS - MV V2 MAX: 108 CM/SEC
BH CV ECHO MEAS - MV V2 MEAN: 49.4 CM/SEC
BH CV ECHO MEAS - MV V2 VTI: 25.6 CM
BH CV ECHO MEAS - MVA P1/2T LCG: 4.7 CM^2
BH CV ECHO MEAS - MVA(P1/2T): 3.2 CM^2
BH CV ECHO MEAS - MVA(VTI): 2 CM^2
BH CV ECHO MEAS - PA ACC TIME: 0.13 SEC
BH CV ECHO MEAS - PA MAX PG: 3.4 MMHG
BH CV ECHO MEAS - PA PR(ACCEL): 21.9 MMHG
BH CV ECHO MEAS - PA V2 MAX: 92.1 CM/SEC
BH CV ECHO MEAS - PI END-D VEL: 152 CM/SEC
BH CV ECHO MEAS - RAP SYSTOLE: 8 MMHG
BH CV ECHO MEAS - RVSP: 36 MMHG
BH CV ECHO MEAS - SI(AO): 97.4 ML/M^2
BH CV ECHO MEAS - SI(CUBED): 26.2 ML/M^2
BH CV ECHO MEAS - SI(LVOT): 27.3 ML/M^2
BH CV ECHO MEAS - SI(MOD-SP2): 23.9 ML/M^2
BH CV ECHO MEAS - SI(MOD-SP4): 29.6 ML/M^2
BH CV ECHO MEAS - SI(TEICH): 24.4 ML/M^2
BH CV ECHO MEAS - SV(AO): 184.9 ML
BH CV ECHO MEAS - SV(CUBED): 49.7 ML
BH CV ECHO MEAS - SV(LVOT): 51.9 ML
BH CV ECHO MEAS - SV(MOD-SP2): 45.3 ML
BH CV ECHO MEAS - SV(MOD-SP4): 56.1 ML
BH CV ECHO MEAS - SV(TEICH): 46.4 ML
BH CV ECHO MEAS - TAPSE (>1.6): 1.4 CM
BH CV ECHO MEAS - TR MAX PG: 28 MMHG
BH CV ECHO MEAS - TR MAX VEL: 264 CM/SEC
BH CV ECHO MEASUREMENTS AVERAGE E/E' RATIO: 9.05
BH CV VAS BP LEFT ARM: NORMAL MMHG
BH CV XLRA - RV BASE: 3.7 CM
BH CV XLRA - RV LENGTH: 6.4 CM
BH CV XLRA - RV MID: 2.3 CM
BH CV XLRA - TDI S': 11.1 CM/SEC
BILIRUB SERPL-MCNC: 0.4 MG/DL (ref 0–1.2)
BILIRUB SERPL-MCNC: 0.6 MG/DL (ref 0–1.2)
BILIRUB SERPL-MCNC: 0.7 MG/DL (ref 0–1.2)
BILIRUB SERPL-MCNC: 0.8 MG/DL (ref 0–1.2)
BILIRUB SERPL-MCNC: 1.1 MG/DL (ref 0–1.2)
BILIRUB UR QL STRIP: NEGATIVE
BLD GP AB SCN SERPL QL: NEGATIVE
BODY TEMPERATURE: 37 C
BODY TEMPERATURE: 37 C
BUN SERPL-MCNC: 15 MG/DL (ref 8–23)
BUN SERPL-MCNC: 23 MG/DL (ref 8–23)
BUN SERPL-MCNC: 23 MG/DL (ref 8–23)
BUN SERPL-MCNC: 25 MG/DL (ref 8–23)
BUN SERPL-MCNC: 37 MG/DL (ref 8–23)
BUN SERPL-MCNC: 38 MG/DL (ref 8–23)
BUN/CREAT SERPL: 11.7 (ref 7–25)
BUN/CREAT SERPL: 12.6 (ref 7–25)
BUN/CREAT SERPL: 13.2 (ref 7–25)
BUN/CREAT SERPL: 19.8 (ref 7–25)
BUN/CREAT SERPL: 23.4 (ref 7–25)
BUN/CREAT SERPL: 28.2 (ref 7–25)
BUN/CREAT SERPL: 30.9 (ref 7–25)
CALCIUM SPEC-SCNC: 7.7 MG/DL (ref 8.6–10.5)
CALCIUM SPEC-SCNC: 8.1 MG/DL (ref 8.6–10.5)
CALCIUM SPEC-SCNC: 8.2 MG/DL (ref 8.6–10.5)
CALCIUM SPEC-SCNC: 8.3 MG/DL (ref 8.6–10.5)
CALCIUM SPEC-SCNC: 8.4 MG/DL (ref 8.6–10.5)
CALCIUM SPEC-SCNC: 8.8 MG/DL (ref 8.6–10.5)
CALCIUM SPEC-SCNC: 9.4 MG/DL (ref 8.6–10.5)
CALCIUM SPEC-SCNC: 9.8 MG/DL (ref 8.6–10.5)
CHLORIDE SERPL-SCNC: 100 MMOL/L (ref 98–107)
CHLORIDE SERPL-SCNC: 102 MMOL/L (ref 98–107)
CHLORIDE SERPL-SCNC: 102 MMOL/L (ref 98–107)
CHLORIDE SERPL-SCNC: 103 MMOL/L (ref 98–107)
CHLORIDE SERPL-SCNC: 104 MMOL/L (ref 98–107)
CHLORIDE SERPL-SCNC: 107 MMOL/L (ref 98–107)
CHLORIDE SERPL-SCNC: 98 MMOL/L (ref 98–107)
CHLORIDE SERPL-SCNC: 99 MMOL/L (ref 98–107)
CHOLEST SERPL-MCNC: 137 MG/DL (ref 0–200)
CHOLEST SERPL-MCNC: 219 MG/DL (ref 0–200)
CLARITY UR: CLEAR
CO2 BLDA-SCNC: 24.8 MMOL/L (ref 22–33)
CO2 BLDA-SCNC: 26.8 MMOL/L (ref 22–33)
CO2 SERPL-SCNC: 21 MMOL/L (ref 22–29)
CO2 SERPL-SCNC: 23 MMOL/L (ref 22–29)
CO2 SERPL-SCNC: 24 MMOL/L (ref 22–29)
CO2 SERPL-SCNC: 25 MMOL/L (ref 22–29)
CO2 SERPL-SCNC: 25 MMOL/L (ref 22–29)
CO2 SERPL-SCNC: 26 MMOL/L (ref 22–29)
COHGB MFR BLD: 0.2 % (ref 0–2)
COHGB MFR BLD: 0.8 % (ref 0–2)
COLOR UR: YELLOW
CREAT SERPL-MCNC: 1.07 MG/DL (ref 0.57–1)
CREAT SERPL-MCNC: 1.14 MG/DL (ref 0.57–1)
CREAT SERPL-MCNC: 1.16 MG/DL (ref 0.57–1)
CREAT SERPL-MCNC: 1.19 MG/DL (ref 0.57–1)
CREAT SERPL-MCNC: 1.23 MG/DL (ref 0.57–1)
CREAT SERPL-MCNC: 1.25 MG/DL (ref 0.57–1)
CREAT SERPL-MCNC: 1.28 MG/DL (ref 0.57–1)
CREAT SERPL-MCNC: 1.31 MG/DL (ref 0.57–1)
CROSSMATCH INTERPRETATION: NORMAL
CRP SERPL-MCNC: 16.33 MG/DL (ref 0–0.5)
D-LACTATE SERPL-SCNC: 4.5 MMOL/L (ref 0.5–2)
DEPRECATED RDW RBC AUTO: 41.5 FL (ref 37–54)
DEPRECATED RDW RBC AUTO: 41.7 FL (ref 37–54)
DEPRECATED RDW RBC AUTO: 42.2 FL (ref 37–54)
DEPRECATED RDW RBC AUTO: 43.2 FL (ref 37–54)
DEPRECATED RDW RBC AUTO: 43.3 FL (ref 37–54)
DEPRECATED RDW RBC AUTO: 44.9 FL (ref 37–54)
DEPRECATED RDW RBC AUTO: 46.4 FL (ref 37–54)
EOSINOPHIL # BLD AUTO: 0.02 10*3/MM3 (ref 0–0.4)
EOSINOPHIL # BLD AUTO: 0.02 10*3/MM3 (ref 0–0.4)
EOSINOPHIL # BLD AUTO: 0.03 10*3/MM3 (ref 0–0.4)
EOSINOPHIL # BLD AUTO: 0.1 10*3/MM3 (ref 0–0.4)
EOSINOPHIL # BLD AUTO: 0.19 10*3/MM3 (ref 0–0.4)
EOSINOPHIL # BLD MANUAL: 0 10*3/MM3 (ref 0–0.4)
EOSINOPHIL NFR BLD AUTO: 0.2 % (ref 0.3–6.2)
EOSINOPHIL NFR BLD AUTO: 1.6 % (ref 0.3–6.2)
EOSINOPHIL NFR BLD AUTO: 1.8 % (ref 0.3–6.2)
EOSINOPHIL NFR BLD MANUAL: 0 % (ref 0.3–6.2)
EPAP: 0
EPAP: 0
ERYTHROCYTE [DISTWIDTH] IN BLOOD BY AUTOMATED COUNT: 13.5 % (ref 12.3–15.4)
ERYTHROCYTE [DISTWIDTH] IN BLOOD BY AUTOMATED COUNT: 13.6 % (ref 12.3–15.4)
ERYTHROCYTE [DISTWIDTH] IN BLOOD BY AUTOMATED COUNT: 13.6 % (ref 12.3–15.4)
ERYTHROCYTE [DISTWIDTH] IN BLOOD BY AUTOMATED COUNT: 13.7 % (ref 12.3–15.4)
ERYTHROCYTE [DISTWIDTH] IN BLOOD BY AUTOMATED COUNT: 14 % (ref 12.3–15.4)
ERYTHROCYTE [DISTWIDTH] IN BLOOD BY AUTOMATED COUNT: 14 % (ref 12.3–15.4)
ERYTHROCYTE [DISTWIDTH] IN BLOOD BY AUTOMATED COUNT: 14.5 % (ref 12.3–15.4)
GFR SERPL CREATININE-BSD FRML MDRD: 48 ML/MIN/1.73
GFR SERPL CREATININE-BSD FRML MDRD: 49 ML/MIN/1.73
GFR SERPL CREATININE-BSD FRML MDRD: 51 ML/MIN/1.73
GFR SERPL CREATININE-BSD FRML MDRD: 53 ML/MIN/1.73
GFR SERPL CREATININE-BSD FRML MDRD: 55 ML/MIN/1.73
GFR SERPL CREATININE-BSD FRML MDRD: 56 ML/MIN/1.73
GFR SERPL CREATININE-BSD FRML MDRD: 60 ML/MIN/1.73
GLOBULIN UR ELPH-MCNC: 2.2 GM/DL
GLOBULIN UR ELPH-MCNC: 2.7 GM/DL
GLOBULIN UR ELPH-MCNC: 3.4 GM/DL
GLOBULIN UR ELPH-MCNC: 3.9 GM/DL
GLUCOSE BLDC GLUCOMTR-MCNC: 112 MG/DL (ref 70–130)
GLUCOSE BLDC GLUCOMTR-MCNC: 129 MG/DL (ref 70–130)
GLUCOSE BLDC GLUCOMTR-MCNC: 150 MG/DL (ref 70–130)
GLUCOSE BLDC GLUCOMTR-MCNC: 157 MG/DL (ref 70–130)
GLUCOSE BLDC GLUCOMTR-MCNC: 167 MG/DL (ref 70–130)
GLUCOSE BLDC GLUCOMTR-MCNC: 182 MG/DL (ref 70–130)
GLUCOSE BLDC GLUCOMTR-MCNC: 82 MG/DL (ref 70–130)
GLUCOSE BLDC GLUCOMTR-MCNC: 87 MG/DL (ref 70–130)
GLUCOSE SERPL-MCNC: 107 MG/DL (ref 65–99)
GLUCOSE SERPL-MCNC: 120 MG/DL (ref 65–99)
GLUCOSE SERPL-MCNC: 121 MG/DL (ref 65–99)
GLUCOSE SERPL-MCNC: 130 MG/DL (ref 65–99)
GLUCOSE SERPL-MCNC: 151 MG/DL (ref 65–99)
GLUCOSE SERPL-MCNC: 162 MG/DL (ref 65–99)
GLUCOSE SERPL-MCNC: 180 MG/DL (ref 65–99)
GLUCOSE SERPL-MCNC: 189 MG/DL (ref 65–99)
GLUCOSE UR STRIP-MCNC: NEGATIVE MG/DL
HBA1C MFR BLD: 6.1 % (ref 4.8–5.6)
HCO3 BLDA-SCNC: 23.7 MMOL/L (ref 20–26)
HCO3 BLDA-SCNC: 25.5 MMOL/L (ref 20–26)
HCT VFR BLD AUTO: 20.4 % (ref 34–46.6)
HCT VFR BLD AUTO: 23.9 % (ref 34–46.6)
HCT VFR BLD AUTO: 26.7 % (ref 34–46.6)
HCT VFR BLD AUTO: 33.3 % (ref 34–46.6)
HCT VFR BLD AUTO: 47.5 % (ref 34–46.6)
HCT VFR BLD AUTO: 50.6 % (ref 34–46.6)
HCT VFR BLD AUTO: 51.3 % (ref 34–46.6)
HCT VFR BLD CALC: 20.5 %
HCT VFR BLD CALC: 45.3 %
HDLC SERPL-MCNC: 40 MG/DL (ref 40–60)
HGB BLD-MCNC: 10.2 G/DL (ref 12–15.9)
HGB BLD-MCNC: 14.5 G/DL (ref 12–15.9)
HGB BLD-MCNC: 15.7 G/DL (ref 12–15.9)
HGB BLD-MCNC: 15.7 G/DL (ref 12–15.9)
HGB BLD-MCNC: 6.2 G/DL (ref 12–15.9)
HGB BLD-MCNC: 7.6 G/DL (ref 12–15.9)
HGB BLD-MCNC: 8.1 G/DL (ref 12–15.9)
HGB BLDA-MCNC: 14.8 G/DL (ref 14–18)
HGB BLDA-MCNC: 6.7 G/DL (ref 14–18)
HGB UR QL STRIP.AUTO: NEGATIVE
HOLD SPECIMEN: NORMAL
IMM GRANULOCYTES # BLD AUTO: 0.01 10*3/MM3 (ref 0–0.05)
IMM GRANULOCYTES # BLD AUTO: 0.03 10*3/MM3 (ref 0–0.05)
IMM GRANULOCYTES # BLD AUTO: 0.04 10*3/MM3 (ref 0–0.05)
IMM GRANULOCYTES # BLD AUTO: 0.06 10*3/MM3 (ref 0–0.05)
IMM GRANULOCYTES # BLD AUTO: 0.19 10*3/MM3 (ref 0–0.05)
IMM GRANULOCYTES NFR BLD AUTO: 0.2 % (ref 0–0.5)
IMM GRANULOCYTES NFR BLD AUTO: 0.3 % (ref 0–0.5)
IMM GRANULOCYTES NFR BLD AUTO: 0.4 % (ref 0–0.5)
IMM GRANULOCYTES NFR BLD AUTO: 0.6 % (ref 0–0.5)
IMM GRANULOCYTES NFR BLD AUTO: 1.4 % (ref 0–0.5)
INHALED O2 CONCENTRATION: 100 %
INHALED O2 CONCENTRATION: 45 %
INR PPP: 1.08 (ref 0.85–1.16)
IPAP: 0
IPAP: 0
KETONES UR QL STRIP: ABNORMAL
LDLC SERPL CALC-MCNC: 166 MG/DL (ref 0–100)
LDLC/HDLC SERPL: 4.12 {RATIO}
LEFT ATRIUM VOLUME INDEX: 24.4 ML/M^2
LEFT ATRIUM VOLUME: 46.4 ML
LEUKOCYTE ESTERASE UR QL STRIP.AUTO: NEGATIVE
LV EF 2D ECHO EST: 45 %
LYMPHOCYTES # BLD AUTO: 1.4 10*3/MM3 (ref 0.7–3.1)
LYMPHOCYTES # BLD AUTO: 1.67 10*3/MM3 (ref 0.7–3.1)
LYMPHOCYTES # BLD AUTO: 1.71 10*3/MM3 (ref 0.7–3.1)
LYMPHOCYTES # BLD AUTO: 1.81 10*3/MM3 (ref 0.7–3.1)
LYMPHOCYTES # BLD AUTO: 2.46 10*3/MM3 (ref 0.7–3.1)
LYMPHOCYTES # BLD MANUAL: 0.7 10*3/MM3 (ref 0.7–3.1)
LYMPHOCYTES NFR BLD AUTO: 10.4 % (ref 19.6–45.3)
LYMPHOCYTES NFR BLD AUTO: 16.9 % (ref 19.6–45.3)
LYMPHOCYTES NFR BLD AUTO: 17.4 % (ref 19.6–45.3)
LYMPHOCYTES NFR BLD AUTO: 23.2 % (ref 19.6–45.3)
LYMPHOCYTES NFR BLD AUTO: 26.8 % (ref 19.6–45.3)
LYMPHOCYTES NFR BLD MANUAL: 3 % (ref 5–12)
LYMPHOCYTES NFR BLD MANUAL: 6 % (ref 19.6–45.3)
Lab: ABNORMAL
Lab: ABNORMAL
MAGNESIUM SERPL-MCNC: 1.8 MG/DL (ref 1.6–2.4)
MAGNESIUM SERPL-MCNC: 1.9 MG/DL (ref 1.6–2.4)
MAGNESIUM SERPL-MCNC: 2 MG/DL (ref 1.6–2.4)
MAGNESIUM SERPL-MCNC: 2.4 MG/DL (ref 1.6–2.4)
MAGNESIUM SERPL-MCNC: 2.6 MG/DL (ref 1.6–2.4)
MCH RBC QN AUTO: 25.9 PG (ref 26.6–33)
MCH RBC QN AUTO: 26 PG (ref 26.6–33)
MCH RBC QN AUTO: 26 PG (ref 26.6–33)
MCH RBC QN AUTO: 26.4 PG (ref 26.6–33)
MCH RBC QN AUTO: 26.4 PG (ref 26.6–33)
MCH RBC QN AUTO: 26.6 PG (ref 26.6–33)
MCH RBC QN AUTO: 27.7 PG (ref 26.6–33)
MCHC RBC AUTO-ENTMCNC: 30.3 G/DL (ref 31.5–35.7)
MCHC RBC AUTO-ENTMCNC: 30.4 G/DL (ref 31.5–35.7)
MCHC RBC AUTO-ENTMCNC: 30.5 G/DL (ref 31.5–35.7)
MCHC RBC AUTO-ENTMCNC: 30.6 G/DL (ref 31.5–35.7)
MCHC RBC AUTO-ENTMCNC: 30.6 G/DL (ref 31.5–35.7)
MCHC RBC AUTO-ENTMCNC: 31 G/DL (ref 31.5–35.7)
MCHC RBC AUTO-ENTMCNC: 31.8 G/DL (ref 31.5–35.7)
MCV RBC AUTO: 84.8 FL (ref 79–97)
MCV RBC AUTO: 85 FL (ref 79–97)
MCV RBC AUTO: 85 FL (ref 79–97)
MCV RBC AUTO: 85.6 FL (ref 79–97)
MCV RBC AUTO: 86.3 FL (ref 79–97)
MCV RBC AUTO: 87.2 FL (ref 79–97)
MCV RBC AUTO: 87.6 FL (ref 79–97)
METHGB BLD QL: 0.5 % (ref 0–1.5)
METHGB BLD QL: 0.9 % (ref 0–1.5)
MODALITY: ABNORMAL
MODALITY: ABNORMAL
MONOCYTES # BLD AUTO: 0.35 10*3/MM3 (ref 0.1–0.9)
MONOCYTES # BLD AUTO: 0.64 10*3/MM3 (ref 0.1–0.9)
MONOCYTES # BLD AUTO: 0.76 10*3/MM3 (ref 0.1–0.9)
MONOCYTES # BLD AUTO: 0.79 10*3/MM3 (ref 0.1–0.9)
MONOCYTES # BLD AUTO: 0.91 10*3/MM3 (ref 0.1–0.9)
MONOCYTES # BLD AUTO: 1.24 10*3/MM3 (ref 0.1–0.9)
MONOCYTES NFR BLD AUTO: 10 % (ref 5–12)
MONOCYTES NFR BLD AUTO: 11.6 % (ref 5–12)
MONOCYTES NFR BLD AUTO: 6.8 % (ref 5–12)
MONOCYTES NFR BLD AUTO: 7.5 % (ref 5–12)
MONOCYTES NFR BLD AUTO: 7.9 % (ref 5–12)
NEUTROPHILS # BLD AUTO: 10.64 10*3/MM3 (ref 1.7–7)
NEUTROPHILS NFR BLD AUTO: 10.88 10*3/MM3 (ref 1.7–7)
NEUTROPHILS NFR BLD AUTO: 3.86 10*3/MM3 (ref 1.7–7)
NEUTROPHILS NFR BLD AUTO: 60.5 % (ref 42.7–76)
NEUTROPHILS NFR BLD AUTO: 66.7 % (ref 42.7–76)
NEUTROPHILS NFR BLD AUTO: 7.06 10*3/MM3 (ref 1.7–7)
NEUTROPHILS NFR BLD AUTO: 7.07 10*3/MM3 (ref 1.7–7)
NEUTROPHILS NFR BLD AUTO: 7.54 10*3/MM3 (ref 1.7–7)
NEUTROPHILS NFR BLD AUTO: 70.6 % (ref 42.7–76)
NEUTROPHILS NFR BLD AUTO: 73.7 % (ref 42.7–76)
NEUTROPHILS NFR BLD AUTO: 80.8 % (ref 42.7–76)
NEUTROPHILS NFR BLD MANUAL: 82 % (ref 42.7–76)
NEUTS BAND NFR BLD MANUAL: 9 % (ref 0–5)
NITRITE UR QL STRIP: NEGATIVE
NOTE: ABNORMAL
NOTE: ABNORMAL
NOTIFIED BY: ABNORMAL
NOTIFIED BY: ABNORMAL
NOTIFIED WHO: ABNORMAL
NOTIFIED WHO: ABNORMAL
NRBC BLD AUTO-RTO: 0 /100 WBC (ref 0–0.2)
NRBC BLD AUTO-RTO: 0.2 /100 WBC (ref 0–0.2)
NRBC SPEC MANUAL: 0 /100 WBC (ref 0–0.2)
OXYHGB MFR BLDV: 96.6 % (ref 94–99)
OXYHGB MFR BLDV: ABNORMAL %
PAW @ PEAK INSP FLOW SETTING VENT: 0 CMH2O
PAW @ PEAK INSP FLOW SETTING VENT: 0 CMH2O
PCO2 BLDA: 35.5 MM HG (ref 35–45)
PCO2 BLDA: 40.1 MM HG (ref 35–45)
PCO2 TEMP ADJ BLD: 35.5 MM HG (ref 35–45)
PCO2 TEMP ADJ BLD: 40.1 MM HG (ref 35–45)
PEEP RESPIRATORY: 5 CM[H2O]
PEEP RESPIRATORY: 5 CM[H2O]
PH BLDA: 7.41 PH UNITS (ref 7.35–7.45)
PH BLDA: 7.43 PH UNITS (ref 7.35–7.45)
PH UR STRIP.AUTO: 6 [PH] (ref 5–8)
PH, TEMP CORRECTED: 7.41 PH UNITS
PH, TEMP CORRECTED: 7.43 PH UNITS
PHOSPHATE SERPL-MCNC: 2.4 MG/DL (ref 2.5–4.5)
PHOSPHATE SERPL-MCNC: 2.7 MG/DL (ref 2.5–4.5)
PHOSPHATE SERPL-MCNC: 4.2 MG/DL (ref 2.5–4.5)
PLAT MORPH BLD: NORMAL
PLATELET # BLD AUTO: 192 10*3/MM3 (ref 140–450)
PLATELET # BLD AUTO: 202 10*3/MM3 (ref 140–450)
PLATELET # BLD AUTO: 219 10*3/MM3 (ref 140–450)
PLATELET # BLD AUTO: 222 10*3/MM3 (ref 140–450)
PLATELET # BLD AUTO: 249 10*3/MM3 (ref 140–450)
PLATELET # BLD AUTO: 286 10*3/MM3 (ref 140–450)
PLATELET # BLD AUTO: 322 10*3/MM3 (ref 140–450)
PMV BLD AUTO: 8.8 FL (ref 6–12)
PMV BLD AUTO: 9.1 FL (ref 6–12)
PMV BLD AUTO: 9.3 FL (ref 6–12)
PMV BLD AUTO: 9.6 FL (ref 6–12)
PMV BLD AUTO: 9.8 FL (ref 6–12)
PMV BLD AUTO: 9.8 FL (ref 6–12)
PMV BLD AUTO: 9.9 FL (ref 6–12)
PO2 BLDA: 282 MM HG (ref 83–108)
PO2 BLDA: 97.2 MM HG (ref 83–108)
PO2 TEMP ADJ BLD: 282 MM HG (ref 83–108)
PO2 TEMP ADJ BLD: 97.2 MM HG (ref 83–108)
POTASSIUM SERPL-SCNC: 3.7 MMOL/L (ref 3.5–5.2)
POTASSIUM SERPL-SCNC: 3.8 MMOL/L (ref 3.5–5.2)
POTASSIUM SERPL-SCNC: 4 MMOL/L (ref 3.5–5.2)
POTASSIUM SERPL-SCNC: 4.2 MMOL/L (ref 3.5–5.2)
POTASSIUM SERPL-SCNC: 4.3 MMOL/L (ref 3.5–5.2)
POTASSIUM SERPL-SCNC: 4.4 MMOL/L (ref 3.5–5.2)
POTASSIUM SERPL-SCNC: 4.6 MMOL/L (ref 3.5–5.2)
POTASSIUM SERPL-SCNC: 4.7 MMOL/L (ref 3.5–5.2)
POTASSIUM SERPL-SCNC: 4.8 MMOL/L (ref 3.5–5.2)
PREALB SERPL-MCNC: 10.4 MG/DL (ref 20–40)
PROCALCITONIN SERPL-MCNC: 1.16 NG/ML (ref 0–0.25)
PROT SERPL-MCNC: 4.6 G/DL (ref 6–8.5)
PROT SERPL-MCNC: 5.5 G/DL (ref 6–8.5)
PROT SERPL-MCNC: 5.9 G/DL (ref 6–8.5)
PROT SERPL-MCNC: 6.8 G/DL (ref 6–8.5)
PROT SERPL-MCNC: 8 G/DL (ref 6–8.5)
PROT UR QL STRIP: ABNORMAL
PROTHROMBIN TIME: 13.7 SECONDS (ref 11.4–14.4)
QT INTERVAL: 398 MS
QTC INTERVAL: 450 MS
RBC # BLD AUTO: 2.33 10*6/MM3 (ref 3.77–5.28)
RBC # BLD AUTO: 2.74 10*6/MM3 (ref 3.77–5.28)
RBC # BLD AUTO: 3.12 10*6/MM3 (ref 3.77–5.28)
RBC # BLD AUTO: 3.86 10*6/MM3 (ref 3.77–5.28)
RBC # BLD AUTO: 5.59 10*6/MM3 (ref 3.77–5.28)
RBC # BLD AUTO: 5.95 10*6/MM3 (ref 3.77–5.28)
RBC # BLD AUTO: 6.05 10*6/MM3 (ref 3.77–5.28)
RBC MORPH BLD: NORMAL
RH BLD: POSITIVE
SARS-COV-2 RNA RESP QL NAA+PROBE: NOT DETECTED
SODIUM SERPL-SCNC: 133 MMOL/L (ref 136–145)
SODIUM SERPL-SCNC: 135 MMOL/L (ref 136–145)
SODIUM SERPL-SCNC: 135 MMOL/L (ref 136–145)
SODIUM SERPL-SCNC: 137 MMOL/L (ref 136–145)
SODIUM SERPL-SCNC: 138 MMOL/L (ref 136–145)
SODIUM SERPL-SCNC: 139 MMOL/L (ref 136–145)
SODIUM SERPL-SCNC: 139 MMOL/L (ref 136–145)
SODIUM SERPL-SCNC: 141 MMOL/L (ref 136–145)
SP GR UR STRIP: 1.02 (ref 1–1.03)
T&S EXPIRATION DATE: NORMAL
T4 FREE SERPL-MCNC: 1.1 NG/DL (ref 0.93–1.7)
TOTAL RATE: 0 BREATHS/MINUTE
TOTAL RATE: 14 BREATHS/MINUTE
TRIGL SERPL-MCNC: 59 MG/DL (ref 0–150)
TRIGL SERPL-MCNC: 72 MG/DL (ref 0–150)
TROPONIN T SERPL-MCNC: <0.01 NG/ML (ref 0–0.03)
TSH SERPL DL<=0.05 MIU/L-ACNC: 1.65 UIU/ML (ref 0.27–4.2)
UNIT  ABO: NORMAL
UNIT  RH: NORMAL
UROBILINOGEN UR QL STRIP: ABNORMAL
VENTILATOR MODE: ABNORMAL
VENTILATOR MODE: ABNORMAL
VLDLC SERPL-MCNC: 13 MG/DL (ref 5–40)
WBC # BLD AUTO: 10.59 10*3/MM3 (ref 3.4–10.8)
WBC # BLD AUTO: 10.68 10*3/MM3 (ref 3.4–10.8)
WBC # BLD AUTO: 11.51 10*3/MM3 (ref 3.4–10.8)
WBC # BLD AUTO: 11.69 10*3/MM3 (ref 3.4–10.8)
WBC # BLD AUTO: 13.46 10*3/MM3 (ref 3.4–10.8)
WBC # BLD AUTO: 6.38 10*3/MM3 (ref 3.4–10.8)
WBC # BLD AUTO: 9.59 10*3/MM3 (ref 3.4–10.8)
WBC MORPH BLD: NORMAL
WHOLE BLOOD HOLD SPECIMEN: NORMAL
WHOLE BLOOD HOLD SPECIMEN: NORMAL

## 2021-01-01 PROCEDURE — 97110 THERAPEUTIC EXERCISES: CPT

## 2021-01-01 PROCEDURE — 84100 ASSAY OF PHOSPHORUS: CPT | Performed by: NURSE PRACTITIONER

## 2021-01-01 PROCEDURE — 82962 GLUCOSE BLOOD TEST: CPT

## 2021-01-01 PROCEDURE — 85610 PROTHROMBIN TIME: CPT | Performed by: SURGERY

## 2021-01-01 PROCEDURE — 84100 ASSAY OF PHOSPHORUS: CPT | Performed by: INTERNAL MEDICINE

## 2021-01-01 PROCEDURE — 36600 WITHDRAWAL OF ARTERIAL BLOOD: CPT

## 2021-01-01 PROCEDURE — 86900 BLOOD TYPING SEROLOGIC ABO: CPT | Performed by: INTERNAL MEDICINE

## 2021-01-01 PROCEDURE — 25010000002 PIPERACILLIN SOD-TAZOBACTAM PER 1 G

## 2021-01-01 PROCEDURE — 25010000002 MORPHINE PER 10 MG: Performed by: SURGERY

## 2021-01-01 PROCEDURE — 70551 MRI BRAIN STEM W/O DYE: CPT

## 2021-01-01 PROCEDURE — 25010000002 PIPERACILLIN SOD-TAZOBACTAM PER 1 G: Performed by: SURGERY

## 2021-01-01 PROCEDURE — 94799 UNLISTED PULMONARY SVC/PX: CPT

## 2021-01-01 PROCEDURE — 0DBV0ZX EXCISION OF MESENTERY, OPEN APPROACH, DIAGNOSTIC: ICD-10-PCS | Performed by: SURGERY

## 2021-01-01 PROCEDURE — 25010000002 NEOSTIGMINE 10 MG/10ML SOLUTION: Performed by: ANESTHESIOLOGY

## 2021-01-01 PROCEDURE — 84134 ASSAY OF PREALBUMIN: CPT | Performed by: INTERNAL MEDICINE

## 2021-01-01 PROCEDURE — 83735 ASSAY OF MAGNESIUM: CPT | Performed by: INTERNAL MEDICINE

## 2021-01-01 PROCEDURE — 25010000002 HEPARIN (PORCINE) PER 1000 UNITS: Performed by: SURGERY

## 2021-01-01 PROCEDURE — 36430 TRANSFUSION BLD/BLD COMPNT: CPT

## 2021-01-01 PROCEDURE — 83050 HGB METHEMOGLOBIN QUAN: CPT

## 2021-01-01 PROCEDURE — 99232 SBSQ HOSP IP/OBS MODERATE 35: CPT | Performed by: INTERNAL MEDICINE

## 2021-01-01 PROCEDURE — 70498 CT ANGIOGRAPHY NECK: CPT

## 2021-01-01 PROCEDURE — 93306 TTE W/DOPPLER COMPLETE: CPT

## 2021-01-01 PROCEDURE — 85025 COMPLETE CBC W/AUTO DIFF WBC: CPT | Performed by: EMERGENCY MEDICINE

## 2021-01-01 PROCEDURE — 83036 HEMOGLOBIN GLYCOSYLATED A1C: CPT | Performed by: NURSE PRACTITIONER

## 2021-01-01 PROCEDURE — 71045 X-RAY EXAM CHEST 1 VIEW: CPT

## 2021-01-01 PROCEDURE — 25010000002 PHENYLEPHRINE 10 MG/ML SOLUTION 5 ML VIAL: Performed by: NURSE PRACTITIONER

## 2021-01-01 PROCEDURE — 86850 RBC ANTIBODY SCREEN: CPT | Performed by: INTERNAL MEDICINE

## 2021-01-01 PROCEDURE — 99233 SBSQ HOSP IP/OBS HIGH 50: CPT | Performed by: STUDENT IN AN ORGANIZED HEALTH CARE EDUCATION/TRAINING PROGRAM

## 2021-01-01 PROCEDURE — U0003 INFECTIOUS AGENT DETECTION BY NUCLEIC ACID (DNA OR RNA); SEVERE ACUTE RESPIRATORY SYNDROME CORONAVIRUS 2 (SARS-COV-2) (CORONAVIRUS DISEASE [COVID-19]), AMPLIFIED PROBE TECHNIQUE, MAKING USE OF HIGH THROUGHPUT TECHNOLOGIES AS DESCRIBED BY CMS-2020-01-R: HCPCS | Performed by: EMERGENCY MEDICINE

## 2021-01-01 PROCEDURE — 88305 TISSUE EXAM BY PATHOLOGIST: CPT | Performed by: SURGERY

## 2021-01-01 PROCEDURE — 0DH60UZ INSERTION OF FEEDING DEVICE INTO STOMACH, OPEN APPROACH: ICD-10-PCS | Performed by: SURGERY

## 2021-01-01 PROCEDURE — 25010000002 MORPHINE PER 10 MG: Performed by: INTERNAL MEDICINE

## 2021-01-01 PROCEDURE — 25010000002 LORAZEPAM PER 2 MG: Performed by: EMERGENCY MEDICINE

## 2021-01-01 PROCEDURE — 80053 COMPREHEN METABOLIC PANEL: CPT | Performed by: EMERGENCY MEDICINE

## 2021-01-01 PROCEDURE — 25010000002 KETOROLAC TROMETHAMINE PER 15 MG: Performed by: FAMILY MEDICINE

## 2021-01-01 PROCEDURE — 0042T HC CT CEREBRAL PERFUSION W/WO CONTRAST: CPT

## 2021-01-01 PROCEDURE — 84484 ASSAY OF TROPONIN QUANT: CPT | Performed by: EMERGENCY MEDICINE

## 2021-01-01 PROCEDURE — 86900 BLOOD TYPING SEROLOGIC ABO: CPT

## 2021-01-01 PROCEDURE — 82375 ASSAY CARBOXYHB QUANT: CPT

## 2021-01-01 PROCEDURE — P9041 ALBUMIN (HUMAN),5%, 50ML: HCPCS | Performed by: NURSE PRACTITIONER

## 2021-01-01 PROCEDURE — 31500 INSERT EMERGENCY AIRWAY: CPT

## 2021-01-01 PROCEDURE — 85027 COMPLETE CBC AUTOMATED: CPT | Performed by: NURSE PRACTITIONER

## 2021-01-01 PROCEDURE — 97166 OT EVAL MOD COMPLEX 45 MIN: CPT

## 2021-01-01 PROCEDURE — 25010000002 HYDRALAZINE PER 20 MG: Performed by: EMERGENCY MEDICINE

## 2021-01-01 PROCEDURE — 25010000002 IOPAMIDOL 61 % SOLUTION: Performed by: EMERGENCY MEDICINE

## 2021-01-01 PROCEDURE — 97162 PT EVAL MOD COMPLEX 30 MIN: CPT

## 2021-01-01 PROCEDURE — 83735 ASSAY OF MAGNESIUM: CPT | Performed by: EMERGENCY MEDICINE

## 2021-01-01 PROCEDURE — 83735 ASSAY OF MAGNESIUM: CPT | Performed by: NURSE PRACTITIONER

## 2021-01-01 PROCEDURE — 25010000002 LORAZEPAM PER 2 MG: Performed by: INTERNAL MEDICINE

## 2021-01-01 PROCEDURE — 0DJ08ZZ INSPECTION OF UPPER INTESTINAL TRACT, VIA NATURAL OR ARTIFICIAL OPENING ENDOSCOPIC: ICD-10-PCS | Performed by: SURGERY

## 2021-01-01 PROCEDURE — 94002 VENT MGMT INPAT INIT DAY: CPT

## 2021-01-01 PROCEDURE — 87040 BLOOD CULTURE FOR BACTERIA: CPT | Performed by: INTERNAL MEDICINE

## 2021-01-01 PROCEDURE — 70496 CT ANGIOGRAPHY HEAD: CPT

## 2021-01-01 PROCEDURE — 82465 ASSAY BLD/SERUM CHOLESTEROL: CPT | Performed by: INTERNAL MEDICINE

## 2021-01-01 PROCEDURE — 85025 COMPLETE CBC W/AUTO DIFF WBC: CPT | Performed by: INTERNAL MEDICINE

## 2021-01-01 PROCEDURE — 99233 SBSQ HOSP IP/OBS HIGH 50: CPT | Performed by: INTERNAL MEDICINE

## 2021-01-01 PROCEDURE — 80053 COMPREHEN METABOLIC PANEL: CPT | Performed by: INTERNAL MEDICINE

## 2021-01-01 PROCEDURE — 84145 PROCALCITONIN (PCT): CPT | Performed by: INTERNAL MEDICINE

## 2021-01-01 PROCEDURE — 25010000002 PROPOFOL 10 MG/ML EMULSION: Performed by: ANESTHESIOLOGY

## 2021-01-01 PROCEDURE — 93005 ELECTROCARDIOGRAM TRACING: CPT | Performed by: EMERGENCY MEDICINE

## 2021-01-01 PROCEDURE — 4A03X5D MEASUREMENT OF ARTERIAL FLOW, INTRACRANIAL, EXTERNAL APPROACH: ICD-10-PCS | Performed by: RADIOLOGY

## 2021-01-01 PROCEDURE — 43246 EGD PLACE GASTROSTOMY TUBE: CPT | Performed by: PHYSICIAN ASSISTANT

## 2021-01-01 PROCEDURE — 25010000002 FUROSEMIDE PER 20 MG: Performed by: FAMILY MEDICINE

## 2021-01-01 PROCEDURE — 25010000002 ALBUMIN HUMAN 5% PER 50 ML: Performed by: NURSE PRACTITIONER

## 2021-01-01 PROCEDURE — 0 IOPAMIDOL PER 1 ML: Performed by: NURSE PRACTITIONER

## 2021-01-01 PROCEDURE — P9016 RBC LEUKOCYTES REDUCED: HCPCS

## 2021-01-01 PROCEDURE — 99285 EMERGENCY DEPT VISIT HI MDM: CPT

## 2021-01-01 PROCEDURE — 85007 BL SMEAR W/DIFF WBC COUNT: CPT | Performed by: SURGERY

## 2021-01-01 PROCEDURE — 0 DIATRIZOATE MEGLUMINE & SODIUM PER 1 ML: Performed by: EMERGENCY MEDICINE

## 2021-01-01 PROCEDURE — 25010000002 FENTANYL CITRATE (PF) 50 MCG/ML SOLUTION: Performed by: NURSE PRACTITIONER

## 2021-01-01 PROCEDURE — 0DBU0ZX EXCISION OF OMENTUM, OPEN APPROACH, DIAGNOSTIC: ICD-10-PCS | Performed by: SURGERY

## 2021-01-01 PROCEDURE — 99223 1ST HOSP IP/OBS HIGH 75: CPT | Performed by: FAMILY MEDICINE

## 2021-01-01 PROCEDURE — 84478 ASSAY OF TRIGLYCERIDES: CPT | Performed by: INTERNAL MEDICINE

## 2021-01-01 PROCEDURE — 84439 ASSAY OF FREE THYROXINE: CPT | Performed by: EMERGENCY MEDICINE

## 2021-01-01 PROCEDURE — 49000 EXPLORATION OF ABDOMEN: CPT | Performed by: PHYSICIAN ASSISTANT

## 2021-01-01 PROCEDURE — 25010000002 FENTANYL 10 MCG/1 ML NS: Performed by: INTERNAL MEDICINE

## 2021-01-01 PROCEDURE — 0 IOPAMIDOL PER 1 ML: Performed by: INTERNAL MEDICINE

## 2021-01-01 PROCEDURE — 70450 CT HEAD/BRAIN W/O DYE: CPT

## 2021-01-01 PROCEDURE — 80048 BASIC METABOLIC PNL TOTAL CA: CPT | Performed by: INTERNAL MEDICINE

## 2021-01-01 PROCEDURE — 88342 IMHCHEM/IMCYTCHM 1ST ANTB: CPT | Performed by: SURGERY

## 2021-01-01 PROCEDURE — 86140 C-REACTIVE PROTEIN: CPT | Performed by: INTERNAL MEDICINE

## 2021-01-01 PROCEDURE — 25010000002 FENTANYL CITRATE (PF) 50 MCG/ML SOLUTION: Performed by: ANESTHESIOLOGY

## 2021-01-01 PROCEDURE — 74177 CT ABD & PELVIS W/CONTRAST: CPT

## 2021-01-01 PROCEDURE — 82805 BLOOD GASES W/O2 SATURATION: CPT

## 2021-01-01 PROCEDURE — 81003 URINALYSIS AUTO W/O SCOPE: CPT | Performed by: EMERGENCY MEDICINE

## 2021-01-01 PROCEDURE — 80053 COMPREHEN METABOLIC PANEL: CPT | Performed by: NURSE PRACTITIONER

## 2021-01-01 PROCEDURE — 88341 IMHCHEM/IMCYTCHM EA ADD ANTB: CPT | Performed by: SURGERY

## 2021-01-01 PROCEDURE — 83605 ASSAY OF LACTIC ACID: CPT | Performed by: INTERNAL MEDICINE

## 2021-01-01 PROCEDURE — 80061 LIPID PANEL: CPT | Performed by: NURSE PRACTITIONER

## 2021-01-01 PROCEDURE — 93306 TTE W/DOPPLER COMPLETE: CPT | Performed by: INTERNAL MEDICINE

## 2021-01-01 PROCEDURE — 84132 ASSAY OF SERUM POTASSIUM: CPT | Performed by: ANESTHESIOLOGY

## 2021-01-01 PROCEDURE — 86920 COMPATIBILITY TEST SPIN: CPT

## 2021-01-01 PROCEDURE — 84443 ASSAY THYROID STIM HORMONE: CPT | Performed by: EMERGENCY MEDICINE

## 2021-01-01 PROCEDURE — 99223 1ST HOSP IP/OBS HIGH 75: CPT | Performed by: NURSE PRACTITIONER

## 2021-01-01 PROCEDURE — U0005 INFEC AGEN DETEC AMPLI PROBE: HCPCS | Performed by: EMERGENCY MEDICINE

## 2021-01-01 PROCEDURE — 85025 COMPLETE CBC W/AUTO DIFF WBC: CPT | Performed by: SURGERY

## 2021-01-01 PROCEDURE — 86901 BLOOD TYPING SEROLOGIC RH(D): CPT | Performed by: INTERNAL MEDICINE

## 2021-01-01 RX ORDER — METOPROLOL TARTRATE 50 MG/1
50 TABLET, FILM COATED ORAL ONCE
Status: COMPLETED | OUTPATIENT
Start: 2021-01-01 | End: 2021-01-01

## 2021-01-01 RX ORDER — ACETAMINOPHEN 325 MG/1
650 TABLET ORAL EVERY 4 HOURS PRN
Status: DISCONTINUED | OUTPATIENT
Start: 2021-01-01 | End: 2021-01-01

## 2021-01-01 RX ORDER — ASPIRIN 81 MG/1
81 TABLET, CHEWABLE ORAL DAILY
Status: DISCONTINUED | OUTPATIENT
Start: 2021-01-01 | End: 2021-01-01

## 2021-01-01 RX ORDER — ACETAMINOPHEN 325 MG/1
650 TABLET ORAL EVERY 6 HOURS PRN
Status: DISCONTINUED | OUTPATIENT
Start: 2021-01-01 | End: 2021-01-01

## 2021-01-01 RX ORDER — ONDANSETRON 2 MG/ML
4 INJECTION INTRAMUSCULAR; INTRAVENOUS EVERY 6 HOURS PRN
Status: DISCONTINUED | OUTPATIENT
Start: 2021-01-01 | End: 2021-01-01 | Stop reason: HOSPADM

## 2021-01-01 RX ORDER — LIDOCAINE HYDROCHLORIDE 10 MG/ML
0.5 INJECTION, SOLUTION EPIDURAL; INFILTRATION; INTRACAUDAL; PERINEURAL ONCE AS NEEDED
Status: CANCELLED | OUTPATIENT
Start: 2021-01-01

## 2021-01-01 RX ORDER — ACETAMINOPHEN 160 MG/5ML
650 SOLUTION ORAL EVERY 4 HOURS PRN
Status: DISCONTINUED | OUTPATIENT
Start: 2021-01-01 | End: 2021-01-01

## 2021-01-01 RX ORDER — ASPIRIN 300 MG/1
300 SUPPOSITORY RECTAL DAILY
Status: DISCONTINUED | OUTPATIENT
Start: 2021-01-01 | End: 2021-01-01

## 2021-01-01 RX ORDER — LORAZEPAM 2 MG/ML
2 INJECTION INTRAMUSCULAR
Status: DISCONTINUED | OUTPATIENT
Start: 2021-01-01 | End: 2021-01-01 | Stop reason: HOSPADM

## 2021-01-01 RX ORDER — FAMOTIDINE 20 MG/1
20 TABLET, FILM COATED ORAL ONCE
Status: CANCELLED | OUTPATIENT
Start: 2021-01-01

## 2021-01-01 RX ORDER — FUROSEMIDE 10 MG/ML
20 INJECTION INTRAMUSCULAR; INTRAVENOUS ONCE
Status: COMPLETED | OUTPATIENT
Start: 2021-01-01 | End: 2021-01-01

## 2021-01-01 RX ORDER — HYDRALAZINE HYDROCHLORIDE 20 MG/ML
20 INJECTION INTRAMUSCULAR; INTRAVENOUS ONCE
Status: COMPLETED | OUTPATIENT
Start: 2021-01-01 | End: 2021-01-01

## 2021-01-01 RX ORDER — ACETAMINOPHEN 650 MG/1
650 SUPPOSITORY RECTAL EVERY 4 HOURS PRN
Status: DISCONTINUED | OUTPATIENT
Start: 2021-01-01 | End: 2021-01-01

## 2021-01-01 RX ORDER — ALBUMIN, HUMAN INJ 5% 5 %
500 SOLUTION INTRAVENOUS ONCE
Status: COMPLETED | OUTPATIENT
Start: 2021-01-01 | End: 2021-01-01

## 2021-01-01 RX ORDER — ETOMIDATE 2 MG/ML
INJECTION INTRAVENOUS
Status: COMPLETED | OUTPATIENT
Start: 2021-01-01 | End: 2021-01-01

## 2021-01-01 RX ORDER — ACETAMINOPHEN 650 MG/1
650 SUPPOSITORY RECTAL EVERY 4 HOURS PRN
Status: DISCONTINUED | OUTPATIENT
Start: 2021-01-01 | End: 2021-01-01 | Stop reason: HOSPADM

## 2021-01-01 RX ORDER — SODIUM CHLORIDE 0.9 % (FLUSH) 0.9 %
10 SYRINGE (ML) INJECTION AS NEEDED
Status: DISCONTINUED | OUTPATIENT
Start: 2021-01-01 | End: 2021-01-01 | Stop reason: HOSPADM

## 2021-01-01 RX ORDER — ONDANSETRON 4 MG/1
4 TABLET, FILM COATED ORAL EVERY 6 HOURS PRN
Status: DISCONTINUED | OUTPATIENT
Start: 2021-01-01 | End: 2021-01-01

## 2021-01-01 RX ORDER — ACETAMINOPHEN 650 MG/1
650 SUPPOSITORY RECTAL EVERY 4 HOURS PRN
Status: DISCONTINUED | OUTPATIENT
Start: 2021-09-28 | End: 2021-01-01 | Stop reason: HOSPADM

## 2021-01-01 RX ORDER — CLOPIDOGREL BISULFATE 75 MG/1
75 TABLET ORAL DAILY
Status: DISCONTINUED | OUTPATIENT
Start: 2021-01-01 | End: 2021-01-01

## 2021-01-01 RX ORDER — LORAZEPAM 2 MG/ML
1 INJECTION INTRAMUSCULAR ONCE
Status: COMPLETED | OUTPATIENT
Start: 2021-01-01 | End: 2021-01-01

## 2021-01-01 RX ORDER — HYDROCODONE BITARTRATE AND ACETAMINOPHEN 5; 325 MG/1; MG/1
1 TABLET ORAL EVERY 6 HOURS PRN
Status: DISCONTINUED | OUTPATIENT
Start: 2021-01-01 | End: 2021-01-01

## 2021-01-01 RX ORDER — HYDROMORPHONE HYDROCHLORIDE 1 MG/ML
0.5 INJECTION, SOLUTION INTRAMUSCULAR; INTRAVENOUS; SUBCUTANEOUS
Status: DISCONTINUED | OUTPATIENT
Start: 2021-01-01 | End: 2021-01-01 | Stop reason: HOSPADM

## 2021-01-01 RX ORDER — PANTOPRAZOLE SODIUM 40 MG/1
40 TABLET, DELAYED RELEASE ORAL
Status: DISCONTINUED | OUTPATIENT
Start: 2021-01-01 | End: 2021-01-01

## 2021-01-01 RX ORDER — MORPHINE SULFATE 4 MG/ML
4 INJECTION, SOLUTION INTRAMUSCULAR; INTRAVENOUS EVERY 6 HOURS
Status: DISCONTINUED | OUTPATIENT
Start: 2021-01-01 | End: 2021-01-01 | Stop reason: HOSPADM

## 2021-01-01 RX ORDER — MIDAZOLAM HYDROCHLORIDE 1 MG/ML
0.5 INJECTION INTRAMUSCULAR; INTRAVENOUS
Status: CANCELLED | OUTPATIENT
Start: 2021-01-01

## 2021-01-01 RX ORDER — KETOROLAC TROMETHAMINE 30 MG/ML
15 INJECTION, SOLUTION INTRAMUSCULAR; INTRAVENOUS EVERY 6 HOURS PRN
Status: DISCONTINUED | OUTPATIENT
Start: 2021-01-01 | End: 2021-01-01 | Stop reason: HOSPADM

## 2021-01-01 RX ORDER — SODIUM CHLORIDE 0.9 % (FLUSH) 0.9 %
10 SYRINGE (ML) INJECTION EVERY 12 HOURS SCHEDULED
Status: CANCELLED | OUTPATIENT
Start: 2021-01-01

## 2021-01-01 RX ORDER — NALOXONE HCL 0.4 MG/ML
0.4 VIAL (ML) INJECTION
Status: DISCONTINUED | OUTPATIENT
Start: 2021-01-01 | End: 2021-01-01

## 2021-01-01 RX ORDER — SCOLOPAMINE TRANSDERMAL SYSTEM 1 MG/1
1 PATCH, EXTENDED RELEASE TRANSDERMAL
Status: DISCONTINUED | OUTPATIENT
Start: 2021-01-01 | End: 2021-01-01 | Stop reason: HOSPADM

## 2021-01-01 RX ORDER — ONDANSETRON 2 MG/ML
4 INJECTION INTRAMUSCULAR; INTRAVENOUS EVERY 6 HOURS PRN
Status: DISCONTINUED | OUTPATIENT
Start: 2021-01-01 | End: 2021-01-01

## 2021-01-01 RX ORDER — SODIUM CHLORIDE, SODIUM LACTATE, POTASSIUM CHLORIDE, CALCIUM CHLORIDE 600; 310; 30; 20 MG/100ML; MG/100ML; MG/100ML; MG/100ML
9 INJECTION, SOLUTION INTRAVENOUS CONTINUOUS
Status: CANCELLED | OUTPATIENT
Start: 2021-01-01

## 2021-01-01 RX ORDER — SCOLOPAMINE TRANSDERMAL SYSTEM 1 MG/1
1 PATCH, EXTENDED RELEASE TRANSDERMAL
Status: DISCONTINUED | OUTPATIENT
Start: 2021-01-01 | End: 2021-01-01 | Stop reason: SDUPTHER

## 2021-01-01 RX ORDER — SODIUM CHLORIDE, SODIUM LACTATE, POTASSIUM CHLORIDE, CALCIUM CHLORIDE 600; 310; 30; 20 MG/100ML; MG/100ML; MG/100ML; MG/100ML
INJECTION, SOLUTION INTRAVENOUS CONTINUOUS PRN
Status: DISCONTINUED | OUTPATIENT
Start: 2021-01-01 | End: 2021-01-01 | Stop reason: SURG

## 2021-01-01 RX ORDER — ONDANSETRON 2 MG/ML
4 INJECTION INTRAMUSCULAR; INTRAVENOUS EVERY 6 HOURS PRN
Status: CANCELLED | OUTPATIENT
Start: 2021-01-01

## 2021-01-01 RX ORDER — SODIUM CHLORIDE 0.9 % (FLUSH) 0.9 %
10 SYRINGE (ML) INJECTION AS NEEDED
Status: DISCONTINUED | OUTPATIENT
Start: 2021-01-01 | End: 2021-01-01

## 2021-01-01 RX ORDER — SODIUM CHLORIDE 0.9 % (FLUSH) 0.9 %
10 SYRINGE (ML) INJECTION AS NEEDED
Status: CANCELLED | OUTPATIENT
Start: 2021-01-01

## 2021-01-01 RX ORDER — LORAZEPAM 2 MG/ML
2 INJECTION INTRAMUSCULAR
Status: CANCELLED | OUTPATIENT
Start: 2021-01-01 | End: 2021-10-06

## 2021-01-01 RX ORDER — MORPHINE SULFATE 4 MG/ML
4 INJECTION, SOLUTION INTRAMUSCULAR; INTRAVENOUS
Status: DISCONTINUED | OUTPATIENT
Start: 2021-01-01 | End: 2021-01-01 | Stop reason: HOSPADM

## 2021-01-01 RX ORDER — GLYCOPYRROLATE 0.2 MG/ML
0.2 INJECTION INTRAMUSCULAR; INTRAVENOUS EVERY 6 HOURS PRN
Status: DISCONTINUED | OUTPATIENT
Start: 2021-01-01 | End: 2021-01-01 | Stop reason: HOSPADM

## 2021-01-01 RX ORDER — FUROSEMIDE 10 MG/ML
20 INJECTION INTRAMUSCULAR; INTRAVENOUS EVERY 6 HOURS PRN
Status: DISCONTINUED | OUTPATIENT
Start: 2021-01-01 | End: 2021-01-01 | Stop reason: HOSPADM

## 2021-01-01 RX ORDER — MAGNESIUM HYDROXIDE 1200 MG/15ML
LIQUID ORAL AS NEEDED
Status: DISCONTINUED | OUTPATIENT
Start: 2021-01-01 | End: 2021-01-01 | Stop reason: HOSPADM

## 2021-01-01 RX ORDER — KETOROLAC TROMETHAMINE 30 MG/ML
15 INJECTION, SOLUTION INTRAMUSCULAR; INTRAVENOUS EVERY 6 HOURS PRN
Status: CANCELLED | OUTPATIENT
Start: 2021-01-01 | End: 2021-10-01

## 2021-01-01 RX ORDER — FENTANYL CITRATE 50 UG/ML
50 INJECTION, SOLUTION INTRAMUSCULAR; INTRAVENOUS
Status: DISCONTINUED | OUTPATIENT
Start: 2021-01-01 | End: 2021-01-01 | Stop reason: HOSPADM

## 2021-01-01 RX ORDER — ROCURONIUM BROMIDE 10 MG/ML
INJECTION, SOLUTION INTRAVENOUS AS NEEDED
Status: DISCONTINUED | OUTPATIENT
Start: 2021-01-01 | End: 2021-01-01 | Stop reason: SURG

## 2021-01-01 RX ORDER — FAMOTIDINE 10 MG/ML
20 INJECTION, SOLUTION INTRAVENOUS ONCE
Status: CANCELLED | OUTPATIENT
Start: 2021-01-01

## 2021-01-01 RX ORDER — ACETAMINOPHEN 650 MG/1
650 SUPPOSITORY RECTAL EVERY 4 HOURS PRN
Status: CANCELLED | OUTPATIENT
Start: 2021-01-01

## 2021-01-01 RX ORDER — ATORVASTATIN CALCIUM 40 MG/1
80 TABLET, FILM COATED ORAL NIGHTLY
Status: DISCONTINUED | OUTPATIENT
Start: 2021-01-01 | End: 2021-01-01

## 2021-01-01 RX ORDER — HYDRALAZINE HYDROCHLORIDE 25 MG/1
25 TABLET, FILM COATED ORAL EVERY 8 HOURS SCHEDULED
Status: DISCONTINUED | OUTPATIENT
Start: 2021-01-01 | End: 2021-01-01

## 2021-01-01 RX ORDER — SODIUM CHLORIDE, SODIUM LACTATE, POTASSIUM CHLORIDE, CALCIUM CHLORIDE 600; 310; 30; 20 MG/100ML; MG/100ML; MG/100ML; MG/100ML
125 INJECTION, SOLUTION INTRAVENOUS CONTINUOUS
Status: DISCONTINUED | OUTPATIENT
Start: 2021-01-01 | End: 2021-01-01

## 2021-01-01 RX ORDER — FENTANYL CITRATE 50 UG/ML
INJECTION, SOLUTION INTRAMUSCULAR; INTRAVENOUS AS NEEDED
Status: DISCONTINUED | OUTPATIENT
Start: 2021-01-01 | End: 2021-01-01 | Stop reason: SURG

## 2021-01-01 RX ORDER — GLYCOPYRROLATE 0.2 MG/ML
0.4 INJECTION INTRAMUSCULAR; INTRAVENOUS
Status: CANCELLED | OUTPATIENT
Start: 2021-01-01

## 2021-01-01 RX ORDER — NEOSTIGMINE METHYLSULFATE 1 MG/ML
INJECTION, SOLUTION INTRAVENOUS AS NEEDED
Status: DISCONTINUED | OUTPATIENT
Start: 2021-01-01 | End: 2021-01-01 | Stop reason: SURG

## 2021-01-01 RX ORDER — PANTOPRAZOLE SODIUM 40 MG/10ML
40 INJECTION, POWDER, LYOPHILIZED, FOR SOLUTION INTRAVENOUS
Status: DISCONTINUED | OUTPATIENT
Start: 2021-01-01 | End: 2021-01-01

## 2021-01-01 RX ORDER — GLYCOPYRROLATE 0.2 MG/ML
INJECTION INTRAMUSCULAR; INTRAVENOUS AS NEEDED
Status: DISCONTINUED | OUTPATIENT
Start: 2021-01-01 | End: 2021-01-01 | Stop reason: SURG

## 2021-01-01 RX ORDER — FAMOTIDINE 20 MG/1
20 TABLET, FILM COATED ORAL 2 TIMES DAILY
Status: DISCONTINUED | OUTPATIENT
Start: 2021-01-01 | End: 2021-01-01

## 2021-01-01 RX ORDER — HEPARIN SODIUM 5000 [USP'U]/ML
5000 INJECTION, SOLUTION INTRAVENOUS; SUBCUTANEOUS EVERY 8 HOURS SCHEDULED
Status: DISCONTINUED | OUTPATIENT
Start: 2021-01-01 | End: 2021-01-01

## 2021-01-01 RX ORDER — ONDANSETRON 4 MG/1
4 TABLET, FILM COATED ORAL EVERY 6 HOURS PRN
Status: CANCELLED | OUTPATIENT
Start: 2021-01-01

## 2021-01-01 RX ORDER — LORAZEPAM 2 MG/ML
0.5 INJECTION INTRAMUSCULAR EVERY 4 HOURS PRN
Status: DISCONTINUED | OUTPATIENT
Start: 2021-01-01 | End: 2021-01-01 | Stop reason: HOSPADM

## 2021-01-01 RX ORDER — FAMOTIDINE 10 MG/ML
20 INJECTION, SOLUTION INTRAVENOUS 2 TIMES DAILY
Status: DISCONTINUED | OUTPATIENT
Start: 2021-01-01 | End: 2021-01-01

## 2021-01-01 RX ORDER — DEXMEDETOMIDINE HYDROCHLORIDE 4 UG/ML
.2-1.5 INJECTION, SOLUTION INTRAVENOUS
Status: DISCONTINUED | OUTPATIENT
Start: 2021-01-01 | End: 2021-01-01

## 2021-01-01 RX ORDER — HALOPERIDOL 5 MG/ML
1 INJECTION INTRAMUSCULAR EVERY 4 HOURS PRN
Status: DISCONTINUED | OUTPATIENT
Start: 2021-01-01 | End: 2021-01-01 | Stop reason: HOSPADM

## 2021-01-01 RX ORDER — BUPIVACAINE HCL/0.9 % NACL/PF 0.125 %
PLASTIC BAG, INJECTION (ML) EPIDURAL AS NEEDED
Status: DISCONTINUED | OUTPATIENT
Start: 2021-01-01 | End: 2021-01-01 | Stop reason: SURG

## 2021-01-01 RX ORDER — CHLORHEXIDINE GLUCONATE 0.12 MG/ML
15 RINSE ORAL EVERY 12 HOURS SCHEDULED
Status: COMPLETED | OUTPATIENT
Start: 2021-01-01 | End: 2021-01-01

## 2021-01-01 RX ORDER — MORPHINE SULFATE 2 MG/ML
2 INJECTION, SOLUTION INTRAMUSCULAR; INTRAVENOUS
Status: DISCONTINUED | OUTPATIENT
Start: 2021-01-01 | End: 2021-01-01

## 2021-01-01 RX ORDER — ONDANSETRON 4 MG/1
4 TABLET, FILM COATED ORAL EVERY 6 HOURS PRN
Status: DISCONTINUED | OUTPATIENT
Start: 2021-01-01 | End: 2021-01-01 | Stop reason: HOSPADM

## 2021-01-01 RX ORDER — GLYCOPYRROLATE 0.2 MG/ML
0.4 INJECTION INTRAMUSCULAR; INTRAVENOUS
Status: DISCONTINUED | OUTPATIENT
Start: 2021-01-01 | End: 2021-01-01 | Stop reason: HOSPADM

## 2021-01-01 RX ORDER — PROPOFOL 10 MG/ML
VIAL (ML) INTRAVENOUS AS NEEDED
Status: DISCONTINUED | OUTPATIENT
Start: 2021-01-01 | End: 2021-01-01 | Stop reason: SURG

## 2021-01-01 RX ORDER — FUROSEMIDE 10 MG/ML
20 INJECTION INTRAMUSCULAR; INTRAVENOUS EVERY 6 HOURS
Status: DISCONTINUED | OUTPATIENT
Start: 2021-01-01 | End: 2021-01-01 | Stop reason: HOSPADM

## 2021-01-01 RX ORDER — SCOLOPAMINE TRANSDERMAL SYSTEM 1 MG/1
1 PATCH, EXTENDED RELEASE TRANSDERMAL
Status: CANCELLED | OUTPATIENT
Start: 2021-09-29

## 2021-01-01 RX ORDER — SODIUM CHLORIDE 0.9 % (FLUSH) 0.9 %
10 SYRINGE (ML) INJECTION EVERY 12 HOURS SCHEDULED
Status: DISCONTINUED | OUTPATIENT
Start: 2021-01-01 | End: 2021-01-01

## 2021-01-01 RX ORDER — FENTANYL CITRATE 50 UG/ML
25 INJECTION, SOLUTION INTRAMUSCULAR; INTRAVENOUS
Status: DISCONTINUED | OUTPATIENT
Start: 2021-01-01 | End: 2021-01-01

## 2021-01-01 RX ORDER — MORPHINE SULFATE 4 MG/ML
4 INJECTION, SOLUTION INTRAMUSCULAR; INTRAVENOUS
Status: CANCELLED | OUTPATIENT
Start: 2021-01-01

## 2021-01-01 RX ORDER — KETOROLAC TROMETHAMINE 15 MG/ML
15 INJECTION, SOLUTION INTRAMUSCULAR; INTRAVENOUS EVERY 6 HOURS PRN
Status: DISCONTINUED | OUTPATIENT
Start: 2021-01-01 | End: 2021-01-01 | Stop reason: HOSPADM

## 2021-01-01 RX ORDER — ASPIRIN 325 MG
325 TABLET ORAL DAILY
Status: DISCONTINUED | OUTPATIENT
Start: 2021-01-01 | End: 2021-01-01

## 2021-01-01 RX ORDER — BISACODYL 10 MG
10 SUPPOSITORY, RECTAL RECTAL DAILY PRN
Status: DISCONTINUED | OUTPATIENT
Start: 2021-01-01 | End: 2021-01-01 | Stop reason: HOSPADM

## 2021-01-01 RX ADMIN — METOPROLOL TARTRATE 50 MG: 50 TABLET, FILM COATED ORAL at 20:10

## 2021-01-01 RX ADMIN — HEPARIN SODIUM 5000 UNITS: 5000 INJECTION, SOLUTION INTRAVENOUS; SUBCUTANEOUS at 05:37

## 2021-01-01 RX ADMIN — PHENYLEPHRINE HYDROCHLORIDE 0.5 MCG/KG/MIN: 10 INJECTION INTRAVENOUS at 00:29

## 2021-01-01 RX ADMIN — FENTANYL CITRATE 25 MCG: 50 INJECTION INTRAMUSCULAR; INTRAVENOUS at 04:47

## 2021-01-01 RX ADMIN — FAMOTIDINE 20 MG: 10 INJECTION, SOLUTION INTRAVENOUS at 10:49

## 2021-01-01 RX ADMIN — SODIUM CHLORIDE, POTASSIUM CHLORIDE, SODIUM LACTATE AND CALCIUM CHLORIDE: 600; 310; 30; 20 INJECTION, SOLUTION INTRAVENOUS at 23:19

## 2021-01-01 RX ADMIN — Medication 100 MCG: at 23:41

## 2021-01-01 RX ADMIN — ASPIRIN 81 MG CHEWABLE TABLET 81 MG: 81 TABLET CHEWABLE at 10:49

## 2021-01-01 RX ADMIN — CHLORHEXIDINE GLUCONATE 0.12% ORAL RINSE 15 ML: 1.2 LIQUID ORAL at 22:07

## 2021-01-01 RX ADMIN — SODIUM CHLORIDE, PRESERVATIVE FREE 10 ML: 5 INJECTION INTRAVENOUS at 21:30

## 2021-01-01 RX ADMIN — DEXMEDETOMIDINE HYDROCHLORIDE 0.2 MCG/KG/HR: 4 INJECTION, SOLUTION INTRAVENOUS at 02:28

## 2021-01-01 RX ADMIN — FAMOTIDINE 20 MG: 20 TABLET ORAL at 20:13

## 2021-01-01 RX ADMIN — PANTOPRAZOLE SODIUM 40 MG: 40 TABLET, DELAYED RELEASE ORAL at 08:46

## 2021-01-01 RX ADMIN — HYDROCODONE BITARTRATE AND ACETAMINOPHEN 1 TABLET: 5; 325 TABLET ORAL at 05:37

## 2021-01-01 RX ADMIN — HEPARIN SODIUM 5000 UNITS: 5000 INJECTION, SOLUTION INTRAVENOUS; SUBCUTANEOUS at 14:45

## 2021-01-01 RX ADMIN — IOPAMIDOL 115 ML: 755 INJECTION, SOLUTION INTRAVENOUS at 22:56

## 2021-01-01 RX ADMIN — ASPIRIN 81 MG CHEWABLE TABLET 81 MG: 81 TABLET CHEWABLE at 08:47

## 2021-01-01 RX ADMIN — SODIUM CHLORIDE, PRESERVATIVE FREE 10 ML: 5 INJECTION INTRAVENOUS at 21:53

## 2021-01-01 RX ADMIN — HEPARIN SODIUM 5000 UNITS: 5000 INJECTION, SOLUTION INTRAVENOUS; SUBCUTANEOUS at 13:13

## 2021-01-01 RX ADMIN — TAZOBACTAM SODIUM AND PIPERACILLIN SODIUM 3.38 G: 375; 3 INJECTION, SOLUTION INTRAVENOUS at 21:52

## 2021-01-01 RX ADMIN — TAZOBACTAM SODIUM AND PIPERACILLIN SODIUM 3.38 G: 375; 3 INJECTION, SOLUTION INTRAVENOUS at 21:31

## 2021-01-01 RX ADMIN — HYDRALAZINE HYDROCHLORIDE 20 MG: 20 INJECTION INTRAMUSCULAR; INTRAVENOUS at 20:10

## 2021-01-01 RX ADMIN — MORPHINE SULFATE 2 MG: 2 INJECTION, SOLUTION INTRAMUSCULAR; INTRAVENOUS at 20:18

## 2021-01-01 RX ADMIN — IOPAMIDOL 100 ML: 612 INJECTION, SOLUTION INTRAVENOUS at 20:09

## 2021-01-01 RX ADMIN — ASPIRIN 81 MG CHEWABLE TABLET 81 MG: 81 TABLET CHEWABLE at 10:05

## 2021-01-01 RX ADMIN — HYDRALAZINE HYDROCHLORIDE 25 MG: 25 TABLET ORAL at 20:13

## 2021-01-01 RX ADMIN — SODIUM CHLORIDE, POTASSIUM CHLORIDE, SODIUM LACTATE AND CALCIUM CHLORIDE 125 ML/HR: 600; 310; 30; 20 INJECTION, SOLUTION INTRAVENOUS at 08:45

## 2021-01-01 RX ADMIN — HYDROCODONE BITARTRATE AND ACETAMINOPHEN 1 TABLET: 5; 325 TABLET ORAL at 13:13

## 2021-01-01 RX ADMIN — KETOROLAC TROMETHAMINE 15 MG: 30 INJECTION, SOLUTION INTRAMUSCULAR; INTRAVENOUS at 23:11

## 2021-01-01 RX ADMIN — CLOPIDOGREL BISULFATE 75 MG: 75 TABLET ORAL at 08:47

## 2021-01-01 RX ADMIN — ATORVASTATIN CALCIUM 80 MG: 40 TABLET, FILM COATED ORAL at 21:54

## 2021-01-01 RX ADMIN — Medication 300 MCG: at 00:32

## 2021-01-01 RX ADMIN — FENTANYL CITRATE 50 MCG/HR: 50 INJECTION, SOLUTION INTRAMUSCULAR; INTRAVENOUS at 23:53

## 2021-01-01 RX ADMIN — MORPHINE SULFATE 4 MG: 4 INJECTION, SOLUTION INTRAMUSCULAR; INTRAVENOUS at 15:32

## 2021-01-01 RX ADMIN — FAMOTIDINE 20 MG: 10 INJECTION, SOLUTION INTRAVENOUS at 21:53

## 2021-01-01 RX ADMIN — Medication 100 MCG: at 23:11

## 2021-01-01 RX ADMIN — ACETAMINOPHEN 650 MG: 650 SUPPOSITORY RECTAL at 20:07

## 2021-01-01 RX ADMIN — CLOPIDOGREL BISULFATE 75 MG: 75 TABLET ORAL at 10:49

## 2021-01-01 RX ADMIN — HYDRALAZINE HYDROCHLORIDE 25 MG: 25 TABLET ORAL at 06:33

## 2021-01-01 RX ADMIN — LORAZEPAM 1 MG: 2 INJECTION INTRAMUSCULAR; INTRAVENOUS at 21:56

## 2021-01-01 RX ADMIN — TAZOBACTAM SODIUM AND PIPERACILLIN SODIUM 3.38 G: 375; 3 INJECTION, SOLUTION INTRAVENOUS at 06:09

## 2021-01-01 RX ADMIN — FAMOTIDINE 20 MG: 10 INJECTION, SOLUTION INTRAVENOUS at 10:06

## 2021-01-01 RX ADMIN — CLOPIDOGREL BISULFATE 75 MG: 75 TABLET ORAL at 10:05

## 2021-01-01 RX ADMIN — SODIUM CHLORIDE, POTASSIUM CHLORIDE, SODIUM LACTATE AND CALCIUM CHLORIDE 125 ML/HR: 600; 310; 30; 20 INJECTION, SOLUTION INTRAVENOUS at 08:54

## 2021-01-01 RX ADMIN — TAZOBACTAM SODIUM AND PIPERACILLIN SODIUM 3.38 G: 375; 3 INJECTION, SOLUTION INTRAVENOUS at 05:12

## 2021-01-01 RX ADMIN — FAMOTIDINE 20 MG: 10 INJECTION, SOLUTION INTRAVENOUS at 08:47

## 2021-01-01 RX ADMIN — SODIUM CHLORIDE, POTASSIUM CHLORIDE, SODIUM LACTATE AND CALCIUM CHLORIDE 125 ML/HR: 600; 310; 30; 20 INJECTION, SOLUTION INTRAVENOUS at 23:25

## 2021-01-01 RX ADMIN — HYDROCODONE BITARTRATE AND ACETAMINOPHEN 1 TABLET: 5; 325 TABLET ORAL at 10:50

## 2021-01-01 RX ADMIN — MORPHINE SULFATE 4 MG: 4 INJECTION, SOLUTION INTRAMUSCULAR; INTRAVENOUS at 08:50

## 2021-01-01 RX ADMIN — ATORVASTATIN CALCIUM 80 MG: 40 TABLET, FILM COATED ORAL at 20:13

## 2021-01-01 RX ADMIN — SODIUM CHLORIDE, PRESERVATIVE FREE 10 ML: 5 INJECTION INTRAVENOUS at 10:06

## 2021-01-01 RX ADMIN — LORAZEPAM 2 MG: 2 INJECTION INTRAMUSCULAR; INTRAVENOUS at 12:12

## 2021-01-01 RX ADMIN — PROPOFOL 100 MG: 10 INJECTION, EMULSION INTRAVENOUS at 23:11

## 2021-01-01 RX ADMIN — NEOSTIGMINE 4 MG: 1 INJECTION INTRAVENOUS at 00:39

## 2021-01-01 RX ADMIN — TAZOBACTAM SODIUM AND PIPERACILLIN SODIUM 3.38 G: 375; 3 INJECTION, SOLUTION INTRAVENOUS at 09:05

## 2021-01-01 RX ADMIN — CHLORHEXIDINE GLUCONATE 0.12% ORAL RINSE 15 ML: 1.2 LIQUID ORAL at 03:04

## 2021-01-01 RX ADMIN — TAZOBACTAM SODIUM AND PIPERACILLIN SODIUM 3.38 G: 375; 3 INJECTION, SOLUTION INTRAVENOUS at 14:46

## 2021-01-01 RX ADMIN — ROCURONIUM BROMIDE 50 MG: 10 INJECTION INTRAVENOUS at 23:11

## 2021-01-01 RX ADMIN — FENTANYL CITRATE 100 MCG: 50 INJECTION, SOLUTION INTRAMUSCULAR; INTRAVENOUS at 23:11

## 2021-01-01 RX ADMIN — HEPARIN SODIUM 5000 UNITS: 5000 INJECTION, SOLUTION INTRAVENOUS; SUBCUTANEOUS at 06:09

## 2021-01-01 RX ADMIN — TAZOBACTAM SODIUM AND PIPERACILLIN SODIUM 3.38 G: 375; 3 INJECTION, SOLUTION INTRAVENOUS at 05:35

## 2021-01-01 RX ADMIN — Medication 100 MCG: at 23:47

## 2021-01-01 RX ADMIN — HYDROCODONE BITARTRATE AND ACETAMINOPHEN 1 TABLET: 5; 325 TABLET ORAL at 21:53

## 2021-01-01 RX ADMIN — GLYCOPYRROLATE 0.8 MG: 0.2 INJECTION INTRAMUSCULAR; INTRAVENOUS at 00:39

## 2021-01-01 RX ADMIN — SODIUM CHLORIDE, PRESERVATIVE FREE 10 ML: 5 INJECTION INTRAVENOUS at 20:26

## 2021-01-01 RX ADMIN — FUROSEMIDE 20 MG: 10 INJECTION INTRAMUSCULAR; INTRAVENOUS at 12:18

## 2021-01-01 RX ADMIN — PHENYLEPHRINE HYDROCHLORIDE 0.5 MCG/KG/MIN: 10 INJECTION INTRAVENOUS at 02:56

## 2021-01-01 RX ADMIN — CHLORHEXIDINE GLUCONATE 0.12% ORAL RINSE 15 ML: 1.2 LIQUID ORAL at 08:50

## 2021-01-01 RX ADMIN — MORPHINE SULFATE 2 MG: 2 INJECTION, SOLUTION INTRAMUSCULAR; INTRAVENOUS at 12:06

## 2021-01-01 RX ADMIN — GLYCOPYRROLATE 0.4 MG: 0.2 INJECTION INTRAMUSCULAR; INTRAVENOUS at 06:06

## 2021-01-01 RX ADMIN — MORPHINE SULFATE 2 MG: 2 INJECTION, SOLUTION INTRAMUSCULAR; INTRAVENOUS at 16:22

## 2021-01-01 RX ADMIN — Medication 15 ML: at 18:25

## 2021-01-01 RX ADMIN — MORPHINE SULFATE 4 MG: 4 INJECTION, SOLUTION INTRAMUSCULAR; INTRAVENOUS at 20:07

## 2021-01-01 RX ADMIN — IOPAMIDOL 75 ML: 755 INJECTION, SOLUTION INTRAVENOUS at 02:03

## 2021-01-01 RX ADMIN — TAZOBACTAM SODIUM AND PIPERACILLIN SODIUM 3.38 G: 375; 3 INJECTION, SOLUTION INTRAVENOUS at 14:08

## 2021-01-01 RX ADMIN — SODIUM CHLORIDE, PRESERVATIVE FREE 10 ML: 5 INJECTION INTRAVENOUS at 03:18

## 2021-01-01 RX ADMIN — HYDRALAZINE HYDROCHLORIDE 25 MG: 25 TABLET ORAL at 13:13

## 2021-01-01 RX ADMIN — MORPHINE SULFATE 2 MG: 2 INJECTION, SOLUTION INTRAMUSCULAR; INTRAVENOUS at 18:37

## 2021-01-01 RX ADMIN — FAMOTIDINE 20 MG: 10 INJECTION, SOLUTION INTRAVENOUS at 21:31

## 2021-01-01 RX ADMIN — SCOPALAMINE 1 PATCH: 1 PATCH, EXTENDED RELEASE TRANSDERMAL at 20:07

## 2021-01-01 RX ADMIN — GLYCOPYRROLATE 0.4 MG: 0.2 INJECTION INTRAMUSCULAR; INTRAVENOUS at 08:50

## 2021-01-01 RX ADMIN — SODIUM CHLORIDE, PRESERVATIVE FREE 10 ML: 5 INJECTION INTRAVENOUS at 10:50

## 2021-01-01 RX ADMIN — GLYCOPYRROLATE 0.4 MG: 0.2 INJECTION INTRAMUSCULAR; INTRAVENOUS at 12:12

## 2021-01-01 RX ADMIN — HYDRALAZINE HYDROCHLORIDE 25 MG: 25 TABLET ORAL at 23:06

## 2021-01-01 RX ADMIN — TAZOBACTAM SODIUM AND PIPERACILLIN SODIUM 3.38 G: 375; 3 INJECTION, SOLUTION INTRAVENOUS at 13:13

## 2021-01-01 RX ADMIN — TAZOBACTAM SODIUM AND PIPERACILLIN SODIUM 3.38 G: 375; 3 INJECTION, SOLUTION INTRAVENOUS at 23:16

## 2021-01-01 RX ADMIN — MORPHINE SULFATE 4 MG: 4 INJECTION, SOLUTION INTRAMUSCULAR; INTRAVENOUS at 12:12

## 2021-01-01 RX ADMIN — ALBUMIN HUMAN 500 ML: 0.05 INJECTION, SOLUTION INTRAVENOUS at 00:21

## 2021-01-01 RX ADMIN — HEPARIN SODIUM 5000 UNITS: 5000 INJECTION, SOLUTION INTRAVENOUS; SUBCUTANEOUS at 20:14

## 2021-01-01 RX ADMIN — ETOMIDATE 20 MG: 2 INJECTION, SOLUTION INTRAVENOUS at 22:30

## 2021-01-01 RX ADMIN — HEPARIN SODIUM 5000 UNITS: 5000 INJECTION, SOLUTION INTRAVENOUS; SUBCUTANEOUS at 21:53

## 2021-01-01 RX ADMIN — FENTANYL CITRATE 25 MCG: 50 INJECTION INTRAMUSCULAR; INTRAVENOUS at 08:47

## 2021-01-01 RX ADMIN — GLYCOPYRROLATE 0.4 MG: 0.2 INJECTION INTRAMUSCULAR; INTRAVENOUS at 13:54

## 2021-01-01 RX ADMIN — MORPHINE SULFATE 2 MG: 2 INJECTION, SOLUTION INTRAMUSCULAR; INTRAVENOUS at 23:06

## 2021-01-01 RX ADMIN — LORAZEPAM 2 MG: 2 INJECTION INTRAMUSCULAR; INTRAVENOUS at 17:22

## 2021-09-22 PROBLEM — K63.1 BOWEL PERFORATION (HCC): Status: ACTIVE | Noted: 2021-01-01

## 2021-09-22 PROBLEM — I63.9 CVA (CEREBRAL VASCULAR ACCIDENT) (HCC): Status: ACTIVE | Noted: 2021-01-01

## 2021-09-23 PROBLEM — A41.9 SEPSIS (HCC): Status: ACTIVE | Noted: 2021-01-01

## 2021-09-23 PROBLEM — J95.821 ACUTE POSTOPERATIVE RESPIRATORY FAILURE (HCC): Status: ACTIVE | Noted: 2021-01-01

## 2021-09-23 PROBLEM — T79.7XXA SUBCUTANEOUS EMPHYSEMA (HCC): Status: ACTIVE | Noted: 2021-01-01

## 2021-09-23 PROBLEM — I67.9 CEREBRAL VASCULAR DISEASE: Status: ACTIVE | Noted: 2021-01-01

## 2021-09-23 PROBLEM — N17.9 AKI (ACUTE KIDNEY INJURY) (HCC): Status: ACTIVE | Noted: 2021-01-01

## 2021-09-23 NOTE — ANESTHESIA PROCEDURE NOTES
Airway  Urgency: elective    Date/Time: 9/22/2021 11:12 PM  Airway not difficult    General Information and Staff    Patient location during procedure: OR    Indications and Patient Condition  Indications for airway management: airway protection    Preoxygenated: yes  MILS not maintained throughout  Mask difficulty assessment: 1 - vent by mask    Final Airway Details  Final airway type: endotracheal airway      Successful airway: ETT  Cuffed: yes   Successful intubation technique: direct laryngoscopy  Endotracheal tube insertion site: oral  Blade: Rubio  Blade size: 3  ETT size (mm): 7.0  Cormack-Lehane Classification: grade I - full view of glottis  Placement verified by: chest auscultation and capnometry   Measured from: lips  ETT/EBT  to lips (cm): 20  Number of attempts at approach: 1  Assessment: lips, teeth, and gum same as pre-op and atraumatic intubation    Additional Comments  Negative epigastric sounds, Breath sound equal bilaterally with symmetric chest rise and fall

## 2021-09-23 NOTE — ANESTHESIA PREPROCEDURE EVALUATION
Anesthesia Evaluation     Patient summary reviewed and Nursing notes reviewed   no history of anesthetic complications:  NPO Solid Status: > 8 hours  NPO Liquid Status: > 8 hours           Airway   Mallampati: II  TM distance: >3 FB  Neck ROM: full  No difficulty expected  Dental      Pulmonary - normal exam   Cardiovascular - normal exam    (+) hypertension, CABG, hyperlipidemia,       Neuro/Psych  (+) CVA,     GI/Hepatic/Renal/Endo    (+)   renal disease,     Musculoskeletal     Abdominal    Substance History      OB/GYN          Other                        Anesthesia Plan    ASA 4 - emergent     general     intravenous induction     Anesthetic plan, all risks, benefits, and alternatives have been provided, discussed and informed consent has been obtained with: patient.

## 2021-09-23 NOTE — ANESTHESIA POSTPROCEDURE EVALUATION
Patient: Jessy Harmon    Procedure Summary     Date: 09/22/21 Room / Location:  [facility] OR 11 /  [facility]    Anesthesia Start: 2306 Anesthesia Stop: 09/23/21 0110    Procedure: LAPAROTOMY EXPLORATORY (N/A Abdomen) Diagnosis:     Surgeons: Javan Smith MD Provider: Sergio Lauren MD    Anesthesia Type: general ASA Status: 4 - Emergent          Anesthesia Type: general    Vitals  Vitals Value Taken Time   /79 09/23/21 0105   Temp     Pulse 61 09/23/21 0107   Resp     SpO2 95 % 09/23/21 0107   Vitals shown include unvalidated device data.        Post Anesthesia Care and Evaluation    Patient location during evaluation: PACU  Patient participation: complete - patient cannot participate  Level of consciousness: obtunded/minimal responses  Pain score: 0  Pain management: adequate  Airway patency: patent  Anesthetic complications: No anesthetic complications  PONV Status: none  Cardiovascular status: acceptable  Respiratory status: ETT    Comments: Pt with poor respiratory effort in pacu.  reintubatred without complication.

## 2021-09-25 PROBLEM — J96.01 ACUTE RESPIRATORY FAILURE WITH HYPOXIA (HCC): Status: ACTIVE | Noted: 2021-01-01

## 2021-09-25 PROBLEM — J95.821 ACUTE POSTOPERATIVE RESPIRATORY FAILURE (HCC): Status: RESOLVED | Noted: 2021-01-01 | Resolved: 2021-01-01

## 2021-09-27 PROBLEM — J96.00 ACUTE RESPIRATORY FAILURE (HCC): Status: ACTIVE | Noted: 2021-01-01

## 2021-09-28 LAB
CYTO UR: NORMAL
LAB AP CASE REPORT: NORMAL
LAB AP CLINICAL INFORMATION: NORMAL
LAB AP DIAGNOSIS COMMENT: NORMAL
PATH REPORT.ADDENDUM SPEC: NORMAL
PATH REPORT.FINAL DX SPEC: NORMAL
PATH REPORT.GROSS SPEC: NORMAL

## 2021-10-01 LAB
BACTERIA SPEC AEROBE CULT: NORMAL
BACTERIA SPEC AEROBE CULT: NORMAL

## 2021-11-09 DIAGNOSIS — I65.1 BASILAR ARTERY STENOSIS: ICD-10-CM

## 2021-11-09 RX ORDER — CLOPIDOGREL BISULFATE 75 MG/1
75 TABLET ORAL DAILY
Qty: 90 TABLET | Refills: 12 | OUTPATIENT
Start: 2021-11-09
